# Patient Record
Sex: FEMALE | Race: WHITE | URBAN - METROPOLITAN AREA
[De-identification: names, ages, dates, MRNs, and addresses within clinical notes are randomized per-mention and may not be internally consistent; named-entity substitution may affect disease eponyms.]

---

## 2016-09-19 RX ORDER — METOPROLOL TARTRATE 50 MG
1 TABLET ORAL
Qty: 0 | Refills: 0 | COMMUNITY
Start: 2016-09-19

## 2016-09-19 RX ORDER — METOPROLOL TARTRATE 50 MG
0 TABLET ORAL
Qty: 90 | Refills: 0 | COMMUNITY
Start: 2016-09-19

## 2016-11-18 RX ORDER — LISINOPRIL 2.5 MG/1
0 TABLET ORAL
Qty: 90 | Refills: 0 | COMMUNITY
Start: 2016-11-18

## 2017-01-04 RX ORDER — CLOPIDOGREL BISULFATE 75 MG/1
1 TABLET, FILM COATED ORAL
Qty: 0 | Refills: 0 | COMMUNITY
Start: 2017-01-04

## 2017-01-04 RX ORDER — CLOPIDOGREL BISULFATE 75 MG/1
0 TABLET, FILM COATED ORAL
Qty: 90 | Refills: 0 | COMMUNITY
Start: 2017-01-04

## 2017-01-30 RX ORDER — AMLODIPINE BESYLATE 2.5 MG/1
0 TABLET ORAL
Qty: 90 | Refills: 0 | COMMUNITY
Start: 2017-01-30

## 2017-01-30 RX ORDER — AMLODIPINE BESYLATE 2.5 MG/1
1 TABLET ORAL
Qty: 0 | Refills: 0 | COMMUNITY
Start: 2017-01-30

## 2017-01-30 RX ORDER — AMLODIPINE BESYLATE 2.5 MG/1
0 TABLET ORAL
Qty: 0 | Refills: 0 | COMMUNITY
Start: 2017-01-30

## 2017-02-13 ENCOUNTER — INPATIENT (INPATIENT)
Facility: HOSPITAL | Age: 82
LOS: 2 days | Discharge: HOME | End: 2017-02-16
Attending: FAMILY MEDICINE | Admitting: FAMILY MEDICINE
Payer: MEDICARE

## 2017-02-13 VITALS
RESPIRATION RATE: 16 BRPM | DIASTOLIC BLOOD PRESSURE: 71 MMHG | WEIGHT: 154.32 LBS | HEART RATE: 52 BPM | HEIGHT: 64 IN | OXYGEN SATURATION: 100 % | SYSTOLIC BLOOD PRESSURE: 154 MMHG | TEMPERATURE: 98 F

## 2017-02-13 LAB
ALBUMIN SERPL ELPH-MCNC: 3.1 G/DL — LOW (ref 3.3–5)
ALP SERPL-CCNC: 57 U/L — SIGNIFICANT CHANGE UP (ref 40–120)
ALT FLD-CCNC: 15 U/L — SIGNIFICANT CHANGE UP (ref 12–78)
ANION GAP SERPL CALC-SCNC: 13 MMOL/L — SIGNIFICANT CHANGE UP (ref 5–17)
ANISOCYTOSIS BLD QL: SLIGHT — SIGNIFICANT CHANGE UP
APTT BLD: 24.7 SEC — LOW (ref 27.5–37.4)
AST SERPL-CCNC: 33 U/L — SIGNIFICANT CHANGE UP (ref 15–37)
BASOPHILS # BLD AUTO: 0 K/UL — SIGNIFICANT CHANGE UP (ref 0–0.2)
BILIRUB SERPL-MCNC: 0.4 MG/DL — SIGNIFICANT CHANGE UP (ref 0.2–1.2)
BUN SERPL-MCNC: 62 MG/DL — HIGH (ref 7–23)
CALCIUM SERPL-MCNC: 7.9 MG/DL — LOW (ref 8.5–10.1)
CHLORIDE SERPL-SCNC: 105 MMOL/L — SIGNIFICANT CHANGE UP (ref 96–108)
CK SERPL-CCNC: 159 U/L — SIGNIFICANT CHANGE UP (ref 26–192)
CO2 SERPL-SCNC: 20 MMOL/L — LOW (ref 22–31)
CREAT SERPL-MCNC: 3.24 MG/DL — HIGH (ref 0.5–1.3)
ELLIPTOCYTES BLD QL SMEAR: SLIGHT — SIGNIFICANT CHANGE UP
EOSINOPHIL # BLD AUTO: 0 K/UL — SIGNIFICANT CHANGE UP (ref 0–0.5)
GLUCOSE SERPL-MCNC: 95 MG/DL — SIGNIFICANT CHANGE UP (ref 70–99)
HCT VFR BLD CALC: 33.2 % — LOW (ref 34.5–45)
HGB BLD-MCNC: 11.8 G/DL — SIGNIFICANT CHANGE UP (ref 11.5–15.5)
INR BLD: 1 RATIO — SIGNIFICANT CHANGE UP (ref 0.88–1.16)
LYMPHOCYTES # BLD AUTO: 1.5 K/UL — SIGNIFICANT CHANGE UP (ref 1–3.3)
LYMPHOCYTES # BLD AUTO: 25 % — SIGNIFICANT CHANGE UP (ref 13–44)
MCHC RBC-ENTMCNC: 30.8 PG — SIGNIFICANT CHANGE UP (ref 27–34)
MCHC RBC-ENTMCNC: 35.6 GM/DL — SIGNIFICANT CHANGE UP (ref 32–36)
MCV RBC AUTO: 86.6 FL — SIGNIFICANT CHANGE UP (ref 80–100)
MONOCYTES # BLD AUTO: 0.7 K/UL — SIGNIFICANT CHANGE UP (ref 0–0.9)
MONOCYTES NFR BLD AUTO: 11 % — SIGNIFICANT CHANGE UP (ref 2–14)
NEUTROPHILS # BLD AUTO: 4.1 K/UL — SIGNIFICANT CHANGE UP (ref 1.8–7.4)
NEUTROPHILS NFR BLD AUTO: 57 % — SIGNIFICANT CHANGE UP (ref 43–77)
NEUTS BAND # BLD: 2 % — SIGNIFICANT CHANGE UP (ref 0–8)
PLAT MORPH BLD: NORMAL — SIGNIFICANT CHANGE UP
PLATELET # BLD AUTO: 159 K/UL — SIGNIFICANT CHANGE UP (ref 150–400)
POIKILOCYTOSIS BLD QL AUTO: SLIGHT — SIGNIFICANT CHANGE UP
POTASSIUM SERPL-MCNC: 3.9 MMOL/L — SIGNIFICANT CHANGE UP (ref 3.5–5.3)
POTASSIUM SERPL-SCNC: 3.9 MMOL/L — SIGNIFICANT CHANGE UP (ref 3.5–5.3)
PROT SERPL-MCNC: 6.1 GM/DL — SIGNIFICANT CHANGE UP (ref 6–8.3)
PROTHROM AB SERPL-ACNC: 11 SEC — SIGNIFICANT CHANGE UP (ref 10–13.1)
RBC # BLD: 3.83 M/UL — SIGNIFICANT CHANGE UP (ref 3.8–5.2)
RBC # FLD: 11.9 % — SIGNIFICANT CHANGE UP (ref 10.3–14.5)
RBC BLD AUTO: (no result)
SODIUM SERPL-SCNC: 138 MMOL/L — SIGNIFICANT CHANGE UP (ref 135–145)
TROPONIN I SERPL-MCNC: 0.02 NG/ML — SIGNIFICANT CHANGE UP (ref 0.01–0.04)
VARIANT LYMPHS # BLD: 5 % — SIGNIFICANT CHANGE UP (ref 0–6)
WBC # BLD: 6.3 K/UL — SIGNIFICANT CHANGE UP (ref 3.8–10.5)
WBC # FLD AUTO: 6.3 K/UL — SIGNIFICANT CHANGE UP (ref 3.8–10.5)

## 2017-02-13 PROCEDURE — 93010 ELECTROCARDIOGRAM REPORT: CPT

## 2017-02-13 PROCEDURE — 70450 CT HEAD/BRAIN W/O DYE: CPT | Mod: 26

## 2017-02-13 PROCEDURE — 99285 EMERGENCY DEPT VISIT HI MDM: CPT

## 2017-02-13 PROCEDURE — 71010: CPT | Mod: 26

## 2017-02-13 RX ORDER — SODIUM CHLORIDE 9 MG/ML
1000 INJECTION INTRAMUSCULAR; INTRAVENOUS; SUBCUTANEOUS
Qty: 0 | Refills: 0 | Status: DISCONTINUED | OUTPATIENT
Start: 2017-02-13 | End: 2017-02-14

## 2017-02-13 RX ORDER — ATORVASTATIN CALCIUM 80 MG/1
10 TABLET, FILM COATED ORAL AT BEDTIME
Qty: 0 | Refills: 0 | Status: DISCONTINUED | OUTPATIENT
Start: 2017-02-13 | End: 2017-02-16

## 2017-02-13 RX ORDER — CLOPIDOGREL BISULFATE 75 MG/1
75 TABLET, FILM COATED ORAL DAILY
Qty: 0 | Refills: 0 | Status: DISCONTINUED | OUTPATIENT
Start: 2017-02-13 | End: 2017-02-16

## 2017-02-13 RX ORDER — AMLODIPINE BESYLATE 2.5 MG/1
2.5 TABLET ORAL DAILY
Qty: 0 | Refills: 0 | Status: DISCONTINUED | OUTPATIENT
Start: 2017-02-13 | End: 2017-02-16

## 2017-02-13 RX ORDER — METOPROLOL TARTRATE 50 MG
100 TABLET ORAL DAILY
Qty: 0 | Refills: 0 | Status: DISCONTINUED | OUTPATIENT
Start: 2017-02-13 | End: 2017-02-16

## 2017-02-13 RX ORDER — HEPARIN SODIUM 5000 [USP'U]/ML
5000 INJECTION INTRAVENOUS; SUBCUTANEOUS EVERY 12 HOURS
Qty: 0 | Refills: 0 | Status: DISCONTINUED | OUTPATIENT
Start: 2017-02-13 | End: 2017-02-16

## 2017-02-13 RX ORDER — DOCUSATE SODIUM 100 MG
100 CAPSULE ORAL
Qty: 0 | Refills: 0 | Status: DISCONTINUED | OUTPATIENT
Start: 2017-02-13 | End: 2017-02-16

## 2017-02-13 RX ORDER — ASPIRIN/CALCIUM CARB/MAGNESIUM 324 MG
81 TABLET ORAL DAILY
Qty: 0 | Refills: 0 | Status: DISCONTINUED | OUTPATIENT
Start: 2017-02-13 | End: 2017-02-16

## 2017-02-13 RX ORDER — ACETAMINOPHEN 500 MG
650 TABLET ORAL EVERY 6 HOURS
Qty: 0 | Refills: 0 | Status: DISCONTINUED | OUTPATIENT
Start: 2017-02-13 | End: 2017-02-16

## 2017-02-13 RX ORDER — SODIUM CHLORIDE 9 MG/ML
3 INJECTION INTRAMUSCULAR; INTRAVENOUS; SUBCUTANEOUS ONCE
Qty: 0 | Refills: 0 | Status: DISCONTINUED | OUTPATIENT
Start: 2017-02-13 | End: 2017-02-16

## 2017-02-13 NOTE — ED PROVIDER NOTE - OBJECTIVE STATEMENT
89 y/o female with no PMHx presents to the ED s/p fall. Pt slipped off her bed. +syncopal episode this morning at the kitchen table while sitting in her chair. Pt unaware that she syncopized. +generalized soreness from falling. Pt states she has not eaten in a few days. Notes she was unable to sign her name. +weakness. +URI sx past few days. On anticoagulants. PMD Dr. Christopher 88 y/o female with PMHx of CAD s/p stents, CKD presents to the ED s/p fall. Pt slipped off her bed, landed on her butt on Saturday (4 days ago). +syncopal episode this morning at the kitchen table while sitting in her chair. Pt unaware that she syncopized. +generalized soreness from falling. Pt states she has not eaten in a few days. Notes she was unable to sign her name. +weakness. +URI sx past few days. On anticoagulants. PMD Dr. Christopher

## 2017-02-13 NOTE — ED PROVIDER NOTE - NS ED MD SCRIBE ATTENDING SCRIBE SECTIONS
DISPOSITION/REVIEW OF SYSTEMS/RESULTS/VITAL SIGNS( Pullset)/HISTORY OF PRESENT ILLNESS/PAST MEDICAL/SURGICAL/SOCIAL HISTORY/PROGRESS NOTE/PHYSICAL EXAM

## 2017-02-13 NOTE — H&P ADULT - HISTORY OF PRESENT ILLNESS
Pt is an 89 y/o woman with PMHx of CAD s/p stents, CKD presents to the ED s/p fall. Pt slipped off her bed, landed on her butt on Saturday (4 days ago). +syncopal episode this morning at the kitchen table while sitting in her chair. Pt unaware that she syncopized. +generalized soreness from falling. Pt states she has not eaten in a few days. Notes she was unable to sign her name. +weakness. +URI sx past few days. On anticoagulants.               Vitals  T(F): 97.7  HR: 61 (52 - 61)  BP: 146/69 (117/69 - 154/71)    RR: 17 (16 - 17) SpO2: 100% (100% - 100%) Pt is an 88 y/o woman with PMHx of CAD s/p stents, CKD presents to the ED s/p fall. Pt slipped off her bed, landed on her butt on Saturday (4 days ago). +syncopal episode this morning at the kitchen table while sitting in her chair. Pt unaware that she syncopized. +generalized soreness from falling. Pt states she has not eaten in a few days. Notes she was unable to sign her name. +weakness. +URI sx past few days. On anticoagulants.         PAST MEDICAL/SURG. HX:    Hypertension,   CKD II  CAD,  PCI.    SOCIAL HX:  She is a nonsmoker.  No history of alcohol or drug  abuse.  She is .  Currently, she lives at home with family.    FAMILY HX:  Noncontributory.                        Vitals  T(F): 97.7  HR: 61 (52 - 61)  BP: 146/69 (117/69 - 154/71)    RR: 17 (16 - 17) SpO2: 100% (100% - 100%) Pt is an 89 y/o woman with PMHx of CAD s/p stents, CKD II who  presents via EMS after LOC. She had a syncopal episode this morning at the kitchen table while sitting in her chair some time after breakfast.  She was  unaware that she syncopized. She c/o generalized soreness from falling.  She also slipped off her bed, landing on her bottom 4 days ago.  She reports she has not eaten very much in the last few days.   She reported recent weakness and cold sx a  few days ago.  She denies fever/chills/cpain, SOB, dizziness.        PAST MEDICAL/SURG. HX:    Hypertension,   CKD II  CAD,  PCI.    SOCIAL HX:  She is a nonsmoker.  No history of alcohol or drug  abuse.  She is .  Currently, she lives at home with family and takes care of her .    FAMILY HX:  Noncontributory.                        Vitals  T(F): 97.7  HR: 61 (52 - 61)  BP: 146/69 (117/69 - 154/71)    RR: 17 (16 - 17) SpO2: 100% (100% - 100%)

## 2017-02-13 NOTE — ED ADULT NURSE NOTE - OBJECTIVE STATEMENT
Pt report falling today, does not recall event, and on Friday.  Pt complains of back soreness.  No bruising or visible injury. Pt takes Plavix and asa.  Trauma alert initiated, DC'd by MD.

## 2017-02-13 NOTE — ED PROVIDER NOTE - PMH
<<----- Click to add NO pertinent Past Medical History No pertinent past medical history CAD (coronary artery disease)    CKD (chronic kidney disease)

## 2017-02-13 NOTE — H&P ADULT - ASSESSMENT
pravastatin Oral Dose: 10 mg At bedtime Special Instructions: As directed   2) 05/26/2012 21:03:16 ezetimibe Oral Dose: 10 mg At bedtime Special Instructions: As directed   3) 05/26/2012 21:02:43 lisinopril Oral Dose: 5 mg DAILY Special Instructions: As directed   4) 05/26/2012 21:02:20 hydrochlorothiazide Oral Dose: 25 mg DAILY Special Instructions: As directed   5) 05/26/2012 21:01:36 metoprolol succinate Oral Dose: 100 mg DAILY Special Instructions: As directed   6) 05/26/2012 20:58:16 clopidogrel Oral Dose: 75 mg DAILY Special Instructions: As directed   7) 05/26/2012 20:57:50 aspirin Oral Dose: 81 mg DAILY Special Instructions: As directed Pt is an 88 y/o woman with PMHx of CAD s/p stents, CKD II who  presents via EMS after LOC. She had a syncopal episode this morning at the kitchen table while sitting in her chair some time after breakfast.  She was  unaware that she syncopized. She c/o generalized soreness from falling.  She also slipped off her bed, landing on her bottom 4 days ago.  She reports she has not eaten very much in the last few days.   She reported recent weakness and cold sx a  few days ago.  She denies fever/chills/cpain, SOB, dizziness.    Pt is admitted w/    I. Syncope DDX arrythmia, vasovagal episode due to dehydration, ACS, valvular heart disease  - telemetry  - [facility] panel  - cont ASA, plavix, statin, B Blocker  - falls risk  - cardiology consult    II. Dehydration  - IVF    III.Acute on  CKD II? Pt has 1 functioning kidney  - Repeat renal function  - consult nephrology

## 2017-02-13 NOTE — ED ADULT NURSE REASSESSMENT NOTE - NS ED NURSE REASSESS COMMENT FT1
Recvd care of this pt at this time. Pt A & Ox 3, VSS, pt in apparent distress, bed rails up, will continue to monitor.

## 2017-02-14 DIAGNOSIS — J10.1 INFLUENZA DUE TO OTHER IDENTIFIED INFLUENZA VIRUS WITH OTHER RESPIRATORY MANIFESTATIONS: ICD-10-CM

## 2017-02-14 DIAGNOSIS — N18.9 CHRONIC KIDNEY DISEASE, UNSPECIFIED: ICD-10-CM

## 2017-02-14 DIAGNOSIS — R29.6 REPEATED FALLS: ICD-10-CM

## 2017-02-14 DIAGNOSIS — R55 SYNCOPE AND COLLAPSE: ICD-10-CM

## 2017-02-14 DIAGNOSIS — I25.10 ATHEROSCLEROTIC HEART DISEASE OF NATIVE CORONARY ARTERY WITHOUT ANGINA PECTORIS: ICD-10-CM

## 2017-02-14 DIAGNOSIS — D64.9 ANEMIA, UNSPECIFIED: ICD-10-CM

## 2017-02-14 DIAGNOSIS — N17.9 ACUTE KIDNEY FAILURE, UNSPECIFIED: ICD-10-CM

## 2017-02-14 DIAGNOSIS — Z90.721 ACQUIRED ABSENCE OF OVARIES, UNILATERAL: Chronic | ICD-10-CM

## 2017-02-14 LAB
ANION GAP SERPL CALC-SCNC: 13 MMOL/L — SIGNIFICANT CHANGE UP (ref 5–17)
BUN SERPL-MCNC: 61 MG/DL — HIGH (ref 7–23)
CALCIUM SERPL-MCNC: 7.8 MG/DL — LOW (ref 8.5–10.1)
CHLORIDE SERPL-SCNC: 107 MMOL/L — SIGNIFICANT CHANGE UP (ref 96–108)
CO2 SERPL-SCNC: 18 MMOL/L — LOW (ref 22–31)
CREAT SERPL-MCNC: 3 MG/DL — HIGH (ref 0.5–1.3)
FLUAV H1 2009 PAND RNA SPEC QL NAA+PROBE: DETECTED
GLUCOSE SERPL-MCNC: 77 MG/DL — SIGNIFICANT CHANGE UP (ref 70–99)
HCT VFR BLD CALC: 32.5 % — LOW (ref 34.5–45)
HGB BLD-MCNC: 10.6 G/DL — LOW (ref 11.5–15.5)
MCHC RBC-ENTMCNC: 28.7 PG — SIGNIFICANT CHANGE UP (ref 27–34)
MCHC RBC-ENTMCNC: 32.7 GM/DL — SIGNIFICANT CHANGE UP (ref 32–36)
MCV RBC AUTO: 87.8 FL — SIGNIFICANT CHANGE UP (ref 80–100)
PLATELET # BLD AUTO: 172 K/UL — SIGNIFICANT CHANGE UP (ref 150–400)
POTASSIUM SERPL-MCNC: 3.7 MMOL/L — SIGNIFICANT CHANGE UP (ref 3.5–5.3)
POTASSIUM SERPL-SCNC: 3.7 MMOL/L — SIGNIFICANT CHANGE UP (ref 3.5–5.3)
RAPID RVP RESULT: DETECTED
RBC # BLD: 3.7 M/UL — LOW (ref 3.8–5.2)
RBC # FLD: 12.4 % — SIGNIFICANT CHANGE UP (ref 10.3–14.5)
SODIUM SERPL-SCNC: 138 MMOL/L — SIGNIFICANT CHANGE UP (ref 135–145)
TROPONIN I SERPL-MCNC: 0.02 NG/ML — SIGNIFICANT CHANGE UP (ref 0.01–0.04)
WBC # BLD: 5.2 K/UL — SIGNIFICANT CHANGE UP (ref 3.8–10.5)
WBC # FLD AUTO: 5.2 K/UL — SIGNIFICANT CHANGE UP (ref 3.8–10.5)

## 2017-02-14 RX ORDER — CHOLECALCIFEROL (VITAMIN D3) 125 MCG
1000 CAPSULE ORAL DAILY
Qty: 0 | Refills: 0 | Status: DISCONTINUED | OUTPATIENT
Start: 2017-02-14 | End: 2017-02-16

## 2017-02-14 RX ORDER — SODIUM CHLORIDE 9 MG/ML
1000 INJECTION, SOLUTION INTRAVENOUS
Qty: 0 | Refills: 0 | Status: DISCONTINUED | OUTPATIENT
Start: 2017-02-14 | End: 2017-02-16

## 2017-02-14 RX ADMIN — AMLODIPINE BESYLATE 2.5 MILLIGRAM(S): 2.5 TABLET ORAL at 06:19

## 2017-02-14 RX ADMIN — HEPARIN SODIUM 5000 UNIT(S): 5000 INJECTION INTRAVENOUS; SUBCUTANEOUS at 06:19

## 2017-02-14 RX ADMIN — Medication 30 MILLIGRAM(S): at 12:40

## 2017-02-14 RX ADMIN — HEPARIN SODIUM 5000 UNIT(S): 5000 INJECTION INTRAVENOUS; SUBCUTANEOUS at 18:10

## 2017-02-14 RX ADMIN — SODIUM CHLORIDE 100 MILLILITER(S): 9 INJECTION, SOLUTION INTRAVENOUS at 14:12

## 2017-02-14 RX ADMIN — Medication 0.1 MILLIGRAM(S): at 06:19

## 2017-02-14 RX ADMIN — CLOPIDOGREL BISULFATE 75 MILLIGRAM(S): 75 TABLET, FILM COATED ORAL at 01:05

## 2017-02-14 RX ADMIN — Medication 0.1 MILLIGRAM(S): at 01:05

## 2017-02-14 RX ADMIN — ATORVASTATIN CALCIUM 10 MILLIGRAM(S): 80 TABLET, FILM COATED ORAL at 01:05

## 2017-02-14 RX ADMIN — Medication 100 MILLIGRAM(S): at 06:19

## 2017-02-14 RX ADMIN — Medication 100 MILLIGRAM(S): at 18:10

## 2017-02-14 RX ADMIN — Medication 81 MILLIGRAM(S): at 01:05

## 2017-02-14 RX ADMIN — Medication 650 MILLIGRAM(S): at 03:53

## 2017-02-14 RX ADMIN — SODIUM CHLORIDE 150 MILLILITER(S): 9 INJECTION INTRAMUSCULAR; INTRAVENOUS; SUBCUTANEOUS at 04:02

## 2017-02-14 RX ADMIN — Medication 1000 UNIT(S): at 18:10

## 2017-02-14 NOTE — PROGRESS NOTE ADULT - PROBLEM SELECTOR PLAN 1
troponin x 2 neg, repeat in am, c/w DAPT, statin, BB, cardiology consult  Ct head negative, non-focal exam, check orthostatics

## 2017-02-14 NOTE — PROGRESS NOTE ADULT - SUBJECTIVE AND OBJECTIVE BOX
Subjective:  Patient is a 89y old  Female who presents with a chief complaint of I fainted and my son called 911 (2017 03:20)    HPI:  Pt is an 89 y/o woman with PMHx of CAD s/p stents, CKD II who  presents via EMS after LOC. She had a syncopal episode this morning at the kitchen table while sitting in her chair some time after breakfast.  She was  unaware that she syncopized. She c/o generalized soreness from falling.  She also slipped off her bed, landing on her bottom 4 days ago.  She reports she has not eaten very much in the last few days.   She reported recent weakness and cold sx a  few days ago.  She denies fever/chills/cpain, SOB, dizziness.        PAST MEDICAL/SURG. HX:    Hypertension,   CKD II  CAD,  PCI.    SOCIAL HX:  She is a nonsmoker.  No history of alcohol or drug  abuse.  She is .  Currently, she lives at home with family and takes care of her .    FAMILY HX:  Noncontributory.                        Vitals  T(F): 97.7  HR: 61 (52 - 61)  BP: 146/69 (117/69 - 154/71)    RR: 17 (16 - 17) SpO2: 100% (100% - 100%) (2017 20:49)      Patient seen and examined at bedside,     Review of system- Rest of the review of system are normal excpet mentioned in HPI    OBJECTIVE:   T(C): 36.7, Max: 36.9 (-14 @ 00:32)  HR: 60 (52 - 93)  BP: 122/73 (110/51 - 178/88)  RR: 17 (16 - 18)  SpO2: 94% (66% - 98%)  Wt(kg): --  Daily     Daily Weight in k.5 (2017 08:22)    LABS:                        10.6   5.2   )-----------( 172      ( 2017 05:21 )             32.5     2017 05:21    138    |  107    |  61     ----------------------------<  77     3.7     |  18     |  3.00     Ca    7.8        2017 05:21    TPro  6.1    /  Alb  3.1    /  TBili  0.4    /  DBili  x      /  AST  33     /  ALT  15     /  AlkPhos  57     2017 19:19    PT/INR - ( 2017 19:19 )   PT: 11.0 sec;   INR: 1.00 ratio         PTT - ( 2017 19:19 )  PTT:24.7 sec  CARDIAC MARKERS ( 2017 01:24 )  0.021 ng/mL / x     / x     / x     / x      CARDIAC MARKERS ( 2017 19:19 )  0.017 ng/mL / x     / 159 U/L / x     / x              CAPILLARY BLOOD GLUCOSE        RECENT CULTURES:    RADIOLOGY & ADDITIONAL TESTS:      PHYSICAL EXAM:  GENERAL: NAD  NERVOUS SYSTEM:  Alert & Oriented X3, non- focal exam,Motor Strength 5/5 B/L upper and lower extremities; DTRs 2+ intact and symmetric  HEAD:  Atraumatic, Normocephalic  EYES: EOMI, PERRLA, conjunctiva and sclera clear  HEENT: Moist mucous membranes  NECK: Supple, No JVD  CHEST/LUNG: Clear to auscultation bilaterally; No rales, no rhonchi, no wheezing, or rubs  HEART: Regular rate and rhythm; No murmurs, rubs, or gallops  ABDOMEN: Soft, Nontender, Nondistended; Bowel sounds present  GENITOURINARY- Voiding, no suprapubic tendeness  EXTREMITIES:  2+ Peripheral Pulses, No clubbing, cyanosis, or edema  MUSCULOSKELETAL:- No muscle tenderness, Muscle tone normal, No joint tenderness, no Joint swelling, Joint range of motion-normal  SKIN-no rash, no lesion      Current medications:  sodium chloride 0.9% lock flush 3milliLiter(s) IV Push once  acetaminophen   Tablet. 650milliGRAM(s) Oral every 6 hours PRN  docusate sodium 100milliGRAM(s) Oral two times a day  heparin  Injectable 5000Unit(s) SubCutaneous every 12 hours  atorvastatin 10milliGRAM(s) Oral at bedtime  clopidogrel Tablet 75milliGRAM(s) Oral daily  metoprolol succinate ER 100milliGRAM(s) Oral daily  amLODIPine   Tablet 2.5milliGRAM(s) Oral daily  aspirin  chewable 81milliGRAM(s) Oral daily  oseltamivir 30milliGRAM(s) Oral daily  sodium chloride 0.45%. 1000milliLiter(s) IV Continuous <Continuous> Subjective:  Patient is a 89y old  Female who presents with a chief complaint of I fainted and my son called 911 (2017 03:20)    HPI:     88 y/o woman with PMHx of CAD s/p stents, HTN,  CKD II admitted on dmitted post-syncope at home, +  LOC. She had a syncopal episode  at the kitchen table  in am while sitting in her chair some time after breakfast.  She was  unaware that she synopsized.  She c/o generalized soreness from falling.  She also slipped off her bed, landing on her bottom 4 days ago.  She reports she has not eaten very much in the last few days.   She reported recent weakness and cold sx a  few days ago.      + for influenza infection on Tamiflu reports poor PO intake, denies CP, palpitations, weakness, numbness, afebrile, tolerates IV fluids    Patient seen and examined at bedside,     Review of system- Rest of the review of system are normal excpet mentioned in HPI          OBJECTIVE:   T(C): 36.7, Max: 36.9 ( @ 00:32)  HR: 60 (52 - 93)  BP: 122/73 (110/51 - 178/88)  RR: 17 (16 - 18)  SpO2: 94% (66% - 98%)  Wt(kg): --  Daily     Daily Weight in k.5 (2017 08:22)    LABS:                        10.6   5.2   )-----------( 172      ( 2017 05:21 )             32.5     2017 05:21    138    |  107    |  61     ----------------------------<  77     3.7     |  18     |  3.00     Ca    7.8        2017 05:21    TPro  6.1    /  Alb  3.1    /  TBili  0.4    /  DBili  x      /  AST  33     /  ALT  15     /  AlkPhos  57     2017 19:19    PT/INR - ( 2017 19:19 )   PT: 11.0 sec;   INR: 1.00 ratio         PTT - ( 2017 19:19 )  PTT:24.7 sec  CARDIAC MARKERS ( 2017 01:24 )  0.021 ng/mL / x     / x     / x     / x      CARDIAC MARKERS ( 2017 19:19 )  0.017 ng/mL / x     / 159 U/L / x     / x          CAPILLARY BLOOD GLUCOSE        RECENT CULTURES:    RADIOLOGY & ADDITIONAL TESTS:  EXAM:  CHEST SINGLE VIEW FRONTAL                            PROCEDURE DATE:  2017        INTERPRETATION:  Exam Date: 2017 4:27 PM    History: Chest pain    Technique: Single frontal portable view of the chest with comparison to    2012    Findings:    The heart is mildly enlarged.  The lungs are grossly clear. The apices   and hemidiaphragms are unremarkable. Degenerative changes of the   visualized osseous structures.        Impression:    No acute disease    No significant interval change as compared to  2012    EXAM:  CT BRAIN                            PROCEDURE DATE:  2017  IMPRESSION:   mild to moderate periventricular white matter ischemia.    Global atrophy.        PHYSICAL EXAM:  GENERAL: NAD  NERVOUS SYSTEM:  Alert & Oriented X3, non- focal exam,Motor Strength 5/5 B/L upper and lower extremities; DTRs 2+ intact and symmetric  HEAD:  Atraumatic, Normocephalic  EYES: EOMI, PERRLA, conjunctiva and sclera clear  HEENT: Moist mucous membranes  NECK: Supple, No JVD  CHEST/LUNG: Clear to auscultation bilaterally; No rales, no rhonchi, no wheezing, or rubs  HEART: Regular rate and rhythm; No murmurs, rubs, or gallops  ABDOMEN: Soft, Nontender, Nondistended; Bowel sounds present  GENITOURINARY- Voiding, no suprapubic tendeness  EXTREMITIES:  2+ Peripheral Pulses, No clubbing, cyanosis, or edema  MUSCULOSKELETAL:- No muscle tenderness, Muscle tone normal, No joint tenderness, no Joint swelling, Joint range of motion-normal  SKIN-no rash, no lesion      Current medications:  sodium chloride 0.9% lock flush 3milliLiter(s) IV Push once  acetaminophen   Tablet. 650milliGRAM(s) Oral every 6 hours PRN  docusate sodium 100milliGRAM(s) Oral two times a day  heparin  Injectable 5000Unit(s) SubCutaneous every 12 hours  atorvastatin 10milliGRAM(s) Oral at bedtime  clopidogrel Tablet 75milliGRAM(s) Oral daily  metoprolol succinate ER 100milliGRAM(s) Oral daily  amLODIPine   Tablet 2.5milliGRAM(s) Oral daily  aspirin  chewable 81milliGRAM(s) Oral daily  oseltamivir 30milliGRAM(s) Oral daily  sodium chloride 0.45%. 1000milliLiter(s) IV Continuous <Continuous>

## 2017-02-14 NOTE — CONSULT NOTE ADULT - SUBJECTIVE AND OBJECTIVE BOX
Chief complaints.  Fell at home and passed out.    HPI:  89 yo woman with Hx of HTN, CAD ( stent placed in ) and known CKD  (baseline creat ~ 2.2 in 2016) admitted post syncope at home.  Pt denies any preceding symptoms. Reports very poor po intake for one week.  Denies fever, cough or SOB. Denies N/V/D.  She also fell on her bottom several days ago.  On admission, found to have clear lungs with worsened renal fx.        PMHX and PSHX.  1.CKD  ( creat 2.2 in 2016)    FAMILY HISTORY:  No pertinent family history in first degree relatives      SOCIAL HISTORY :  No hx of smoking or ETOH    ALLERGIES :  No Known Allergies    Intolerances    ROS.:   Denies dizzyness, reports poor appetite, Denies SOB, Denies chest discomfort, Denies abd discomfort         MEDICATIONS  (STANDING):  sodium chloride 0.9% lock flush 3milliLiter(s) IV Push once  docusate sodium 100milliGRAM(s) Oral two times a day  sodium chloride 0.9%. 1000milliLiter(s) IV Continuous <Continuous>  heparin  Injectable 5000Unit(s) SubCutaneous every 12 hours  cloNIDine 0.1milliGRAM(s) Oral two times a day  atorvastatin 10milliGRAM(s) Oral at bedtime  clopidogrel Tablet 75milliGRAM(s) Oral daily  metoprolol succinate ER 100milliGRAM(s) Oral daily  amLODIPine   Tablet 2.5milliGRAM(s) Oral daily  aspirin  chewable 81milliGRAM(s) Oral daily  oseltamivir 30milliGRAM(s) Oral daily    MEDICATIONS  (PRN):  acetaminophen   Tablet. 650milliGRAM(s) Oral every 6 hours PRN Mild Pain (1 - 3)         Vital Signs Last 24 Hrs  T(C): 36.7, Max: 36.9 (- @ 00:32)  T(F): 98.1, Max: 98.5 (- @ 00:32)  HR: 60 (52 - 93)  BP: 122/73 (110/51 - 178/88)  BP(mean): --  RR: 17 (16 - 18)  SpO2: 94% (66% - 98%)  Daily Height in cm: 165.1 (2017 13:32)    Daily Weight in k.5 (2017 08:22)  I&O's Summary    I & Os for current day (as of 2017 12:52)  =============================================  IN: 262 ml / OUT: 0 ml / NET: 262 ml      PHYSICAL EXAM:  Alert and appropriate  GEN: No apparent distress  HEENT: WNL  NECK : Supple  CV: S1S2 RRR  LUNGS: clear to aus  ABD: soft  EXT: no edema    LABS:                        10.6   5.2   )-----------( 172      ( 2017 05:21 )             32.5     2017 05:21    138    |  107    |  61     ----------------------------<  77     3.7     |  18     |  3.00     Ca    7.8        2017 05:21    TPro  6.1    /  Alb  3.1    /  TBili  0.4    /  DBili  x      /  AST  33     /  ALT  15     /  AlkPhos  57     2017 19:19    PT/INR - ( 2017 19:19 )   PT: 11.0 sec;   INR: 1.00 ratio         PTT - ( 2017 19:19 )  PTT:24.7 sec

## 2017-02-14 NOTE — PATIENT PROFILE ADULT. - VISION (WITH CORRECTIVE LENSES IF THE PATIENT USUALLY WEARS THEM):
wears glasses for tv/Normal vision: sees adequately in most situations; can see medication labels, newsprint

## 2017-02-14 NOTE — CONSULT NOTE ADULT - ASSESSMENT
89 yo woman with HTN, and known CAD and CKD admitted post syncope.  Positive for influenza.  LEVI likely due to recent one week hx of very limited po intake.    --LEVI/CKD : Continue IVF until adequate po intake can be maintained  --Anemia work up  --HTN  : adjust meds as needed to avoid continued borderline BP.

## 2017-02-14 NOTE — PROGRESS NOTE ADULT - ASSESSMENT
89 y/o woman with PMHx of CAD s/p stents, HTN,  CKD II admitted on 2/13/17admitted post-syncope at home, +  LOC. She had a syncopal episode  at the kitchen table  in am while sitting in her chair some time after breakfast.  She was  unaware that she synopsized.  She c/o generalized soreness from falling.  She also slipped off her bed, landing on her bottom 4 days ago.  She reports she has not eaten very much in the last few days.   She reported recent weakness and cold sx a  few days ago.     2/14 + for influenza infection on Tamiflu reports poor PO intake, denies CP, palpitations, weakness, numbness, afebrile, tolerates IV fluids

## 2017-02-15 LAB
ALBUMIN SERPL ELPH-MCNC: 2.8 G/DL — LOW (ref 3.3–5)
ANION GAP SERPL CALC-SCNC: 11 MMOL/L — SIGNIFICANT CHANGE UP (ref 5–17)
BUN SERPL-MCNC: 50 MG/DL — HIGH (ref 7–23)
CALCIUM SERPL-MCNC: 7.7 MG/DL — LOW (ref 8.5–10.1)
CHLORIDE SERPL-SCNC: 110 MMOL/L — HIGH (ref 96–108)
CO2 SERPL-SCNC: 18 MMOL/L — LOW (ref 22–31)
CREAT SERPL-MCNC: 2.7 MG/DL — HIGH (ref 0.5–1.3)
FERRITIN SERPL-MCNC: 277 NG/ML — HIGH (ref 15–150)
GLUCOSE SERPL-MCNC: 79 MG/DL — SIGNIFICANT CHANGE UP (ref 70–99)
HCT VFR BLD CALC: 30.6 % — LOW (ref 34.5–45)
HCT VFR BLD CALC: 31 % — LOW (ref 34–45)
HGB BLD-MCNC: 10.4 G/DL — LOW (ref 11.5–15.5)
IRON SATN MFR SERPL: 22 % — SIGNIFICANT CHANGE UP (ref 14–50)
IRON SATN MFR SERPL: 42 UG/DL — SIGNIFICANT CHANGE UP (ref 30–160)
MCHC RBC-ENTMCNC: 29.5 PG — SIGNIFICANT CHANGE UP (ref 27–34)
MCHC RBC-ENTMCNC: 33.8 GM/DL — SIGNIFICANT CHANGE UP (ref 32–36)
MCV RBC AUTO: 87.2 FL — SIGNIFICANT CHANGE UP (ref 80–100)
PHOSPHATE SERPL-MCNC: 2.6 MG/DL — SIGNIFICANT CHANGE UP (ref 2.5–4.5)
PLATELET # BLD AUTO: 175 K/UL — SIGNIFICANT CHANGE UP (ref 150–400)
POTASSIUM SERPL-MCNC: 3.6 MMOL/L — SIGNIFICANT CHANGE UP (ref 3.5–5.3)
POTASSIUM SERPL-SCNC: 3.6 MMOL/L — SIGNIFICANT CHANGE UP (ref 3.5–5.3)
RBC # BLD: 3.51 M/UL — LOW (ref 3.8–5.2)
RBC # FLD: 12.3 % — SIGNIFICANT CHANGE UP (ref 10.3–14.5)
SODIUM SERPL-SCNC: 139 MMOL/L — SIGNIFICANT CHANGE UP (ref 135–145)
TIBC SERPL-MCNC: 193 UG/DL — LOW (ref 220–430)
TROPONIN I SERPL-MCNC: 0.02 NG/ML — SIGNIFICANT CHANGE UP (ref 0.01–0.04)
UIBC SERPL-MCNC: 151 UG/DL — SIGNIFICANT CHANGE UP (ref 110–370)
VIT B12 SERPL-MCNC: 875 PG/ML — SIGNIFICANT CHANGE UP (ref 243–894)
WBC # BLD: 5.7 K/UL — SIGNIFICANT CHANGE UP (ref 3.8–10.5)
WBC # FLD AUTO: 5.7 K/UL — SIGNIFICANT CHANGE UP (ref 3.8–10.5)

## 2017-02-15 PROCEDURE — 99223 1ST HOSP IP/OBS HIGH 75: CPT

## 2017-02-15 RX ADMIN — SODIUM CHLORIDE 100 MILLILITER(S): 9 INJECTION, SOLUTION INTRAVENOUS at 02:02

## 2017-02-15 RX ADMIN — Medication 1000 UNIT(S): at 13:05

## 2017-02-15 RX ADMIN — Medication 100 MILLIGRAM(S): at 06:26

## 2017-02-15 RX ADMIN — AMLODIPINE BESYLATE 2.5 MILLIGRAM(S): 2.5 TABLET ORAL at 06:26

## 2017-02-15 RX ADMIN — ATORVASTATIN CALCIUM 10 MILLIGRAM(S): 80 TABLET, FILM COATED ORAL at 22:09

## 2017-02-15 RX ADMIN — CLOPIDOGREL BISULFATE 75 MILLIGRAM(S): 75 TABLET, FILM COATED ORAL at 13:05

## 2017-02-15 RX ADMIN — Medication 81 MILLIGRAM(S): at 13:05

## 2017-02-15 RX ADMIN — Medication 30 MILLIGRAM(S): at 13:04

## 2017-02-15 RX ADMIN — HEPARIN SODIUM 5000 UNIT(S): 5000 INJECTION INTRAVENOUS; SUBCUTANEOUS at 06:26

## 2017-02-15 RX ADMIN — HEPARIN SODIUM 5000 UNIT(S): 5000 INJECTION INTRAVENOUS; SUBCUTANEOUS at 17:34

## 2017-02-15 RX ADMIN — Medication 100 MILLIGRAM(S): at 13:05

## 2017-02-15 NOTE — PROGRESS NOTE ADULT - ASSESSMENT
89 yo woman with HTN, and known CAD and CKD admitted post syncope.  Positive for influenza.  LEVI likely due to recent one week hx of very limited po intake.    --LEVI/CKD : Continue IVF until adequate po intake can be maintained  --Anemia work up  --HTN  : adjust meds as needed to avoid continued borderline BP.    2/15   --LEVI/CKD improving   Continue current IVF for maintenance fluid and follow po intake.  --ANEMIA : work up   --HTN  Control fair.  Continue to follow.

## 2017-02-15 NOTE — CONSULT NOTE ADULT - ASSESSMENT
Assessment:  · Assessment		  Pt is an 88 y/o woman with PMHx of CAD s/p stents, CKD II who  presents via EMS after LOC. She had a syncopal episode this morning at the kitchen table while sitting in her chair some time after breakfast.  She was  unaware that she syncopized. She c/o generalized soreness from falling.  She also slipped off her bed, landing on her bottom 4 days ago.  She reports she has not eaten very much in the last few days.   She reported recent weakness and cold sx a  few days ago.  She denies fever/chills/cpain, SOB, dizziness.    Pt is admitted w/    I. Syncope DDX arrythmia, vasovagal episode due to dehydration,   - telemetry  - [facility] panel  - cont ASA, plavix, statin, B Blocker  - falls risk      II. Dehydration  - IVF  positive influenza    III.Acute on  CKD II? Pt has 1 functioning kidney  - Repeat renal function  renal consult  -

## 2017-02-15 NOTE — PROGRESS NOTE ADULT - SUBJECTIVE AND OBJECTIVE BOX
NEPHROLOGY INTERVAL HPI/OVERNIGHT EVENTS:  Feeling weak, still with poor po intake.    HPI:  Pt is an 89 y/o woman with PMHx of CAD s/p stents, CKD II who  presents via EMS after LOC. She had a syncopal episode this morning at the kitchen table while sitting in her chair some time after breakfast.  She was  unaware that she syncopized. She c/o generalized soreness from falling.  She also slipped off her bed, landing on her bottom 4 days ago.  She reports she has not eaten very much in the last few days.   She reported recent weakness and cold sx a  few days ago.  She denies fever/chills/cpain, SOB, dizziness.        PAST MEDICAL/SURG. HX:    Hypertension,   CKD II  CAD,  PCI.    SOCIAL HX:  She is a nonsmoker.  No history of alcohol or drug  abuse.  She is .  Currently, she lives at home with family and takes care of her .    FAMILY HX:  Noncontributory.    Vitals  T(F): 97.7  HR: 61 (52 - 61)  BP: 146/69 (117/69 - 154/71)    RR: 17 (16 - 17) SpO2: 100% (100% - 100%) (2017 20:49)          MEDICATIONS  (STANDING):  sodium chloride 0.9% lock flush 3milliLiter(s) IV Push once  docusate sodium 100milliGRAM(s) Oral two times a day  heparin  Injectable 5000Unit(s) SubCutaneous every 12 hours  atorvastatin 10milliGRAM(s) Oral at bedtime  clopidogrel Tablet 75milliGRAM(s) Oral daily  metoprolol succinate ER 100milliGRAM(s) Oral daily  amLODIPine   Tablet 2.5milliGRAM(s) Oral daily  aspirin  chewable 81milliGRAM(s) Oral daily  oseltamivir 30milliGRAM(s) Oral daily  sodium chloride 0.45%. 1000milliLiter(s) IV Continuous <Continuous>  cholecalciferol 1000Unit(s) Oral daily    MEDICATIONS  (PRN):  acetaminophen   Tablet. 650milliGRAM(s) Oral every 6 hours PRN Mild Pain (1 - 3)          Vital Signs Last 24 Hrs  T(C): 36.4, Max: 36.9 (02-15 @ 06:20)  T(F): 97.6, Max: 98.4 (02-15 @ 06:20)  HR: 67 (58 - 68)  BP: 122/89 (122/89 - 165/89)  BP(mean): --  RR: 18 (18 - 18)  SpO2: 95% (95% - 98%)  Daily     Daily Weight in k (15 Feb 2017 10:25)    I & Os for current day (as of 02-15 @ 11:21)  =============================================  IN: 999 ml / OUT: 0 ml / NET: 999 ml      PHYSICAL EXAM:  Alert and appropriate  GENERAL: no acute distress  CHEST/LUNG: clear to aus  HEART: s1s2 RRR  ABDOMEN: soft  EXTREMITIES:no edema   SKIN:     LABS:                        10.4   5.7   )-----------( 175      ( 15 Feb 2017 06:19 )             30.6     15 Feb 2017 06:19    139    |  110    |  50     ----------------------------<  79     3.6     |  18     |  2.70     Ca    7.7        15 Feb 2017 06:19  Phos  2.6       15 Feb 2017 06:19    TPro  x      /  Alb  2.8    /  TBili  x      /  DBili  x      /  AST  x      /  ALT  x      /  AlkPhos  x      15 Feb 2017 06:19    PT/INR - ( 2017 19:19 )   PT: 11.0 sec;   INR: 1.00 ratio         PTT - ( 2017 19:19 )  PTT:24.7 sec    Phosphorus Level, Serum: 2.6 mg/dL (02-15 @ 06:19)          RADIOLOGY & ADDITIONAL TESTS:

## 2017-02-15 NOTE — PROGRESS NOTE ADULT - SUBJECTIVE AND OBJECTIVE BOX
CHIEF COMPLAINT:  flu    SUBJECTIVE:   feeling ok. not sleeping well here. wants to go home. has lots of support.    REVIEW OF SYSTEMS:      EYES/ENT: No visual changes;  No vertigo or throat pain   RESPIRATORY: + cough  CARDIOVASCULAR: No chest pain or palpitations  GASTROINTESTINAL: No abdominal or epigastric pain  All other review of systems is negative unless indicated above    Vital Signs Last 24 Hrs  T(C): 36.4, Max: 36.9 (02-15 @ 06:20)  T(F): 97.6, Max: 98.4 (02-15 @ 06:20)  HR: 67 (58 - 68)  BP: 122/89 (122/89 - 165/89)  BP(mean): --  RR: 18 (18 - 18)  SpO2: 95% (95% - 98%)    I&O's Summary    I & Os for current day (as of 15 Feb 2017 15:48)  =============================================  IN: 999 ml / OUT: 0 ml / NET: 999 ml      CAPILLARY BLOOD GLUCOSE      PHYSICAL EXAM:    Constitutional: NAD, awake and alert, well-developed  HEENT: PERR, EOMI, Normal Hearing, MMM  Neck: Soft and supple, No LAD, No JVD  Respiratory: Breath sounds are coarse  Cardiovascular: S1 and S2, regular rate and rhythm, no Murmurs, gallops or rubs  Gastrointestinal: Bowel Sounds present, soft  Extremities: No peripheral edema  Vascular: 2+ peripheral pulses  Neurological: A/O x 3, no focal deficits  Musculoskeletal: moves all extrem  Skin: No rashes    MEDICATIONS:  MEDICATIONS  (STANDING):  sodium chloride 0.9% lock flush 3milliLiter(s) IV Push once  docusate sodium 100milliGRAM(s) Oral two times a day  heparin  Injectable 5000Unit(s) SubCutaneous every 12 hours  atorvastatin 10milliGRAM(s) Oral at bedtime  clopidogrel Tablet 75milliGRAM(s) Oral daily  metoprolol succinate ER 100milliGRAM(s) Oral daily  amLODIPine   Tablet 2.5milliGRAM(s) Oral daily  aspirin  chewable 81milliGRAM(s) Oral daily  oseltamivir 30milliGRAM(s) Oral daily  sodium chloride 0.45%. 1000milliLiter(s) IV Continuous <Continuous>  cholecalciferol 1000Unit(s) Oral daily      LABS: All Labs Reviewed:                        10.4   5.7   )-----------( 175      ( 15 Feb 2017 06:19 )             30.6     15 Feb 2017 06:19    139    |  110    |  50     ----------------------------<  79     3.6     |  18     |  2.70     Ca    7.7        15 Feb 2017 06:19  Phos  2.6       15 Feb 2017 06:19    TPro  x      /  Alb  2.8    /  TBili  x      /  DBili  x      /  AST  x      /  ALT  x      /  AlkPhos  x      15 Feb 2017 06:19    PT/INR - ( 13 Feb 2017 19:19 )   PT: 11.0 sec;   INR: 1.00 ratio         PTT - ( 13 Feb 2017 19:19 )  PTT:24.7 sec  CARDIAC MARKERS ( 15 Feb 2017 06:19 )  0.024 ng/mL / x     / x     / x     / x      CARDIAC MARKERS ( 14 Feb 2017 01:24 )  0.021 ng/mL / x     / x     / x     / x      CARDIAC MARKERS ( 13 Feb 2017 19:19 )  0.017 ng/mL / x     / 159 U/L / x     / x              89 y/o woman with PMHx of CAD s/p stents, HTN,  CKD II admitted on 2/13/17admitted post-syncope at home, +  LOC. She had a syncopal episode  at the kitchen table  in am while sitting in her chair some time after breakfast.  She was  unaware that she synopsized.  She c/o generalized soreness from falling.  She also slipped off her bed, landing on her bottom 4 days ago.  She reports she has not eaten very much in the last few days.   She reported recent weakness and cold sx a  few days ago.     She was admitted to telemetry for syncope evaluation. She tested + for influenza infection on Tamiflu. She was seen by renal, cardiology, and PT prior to discharge.      Assessment and Plan:       Problem/Plan - 1:  · Syncope  troponin  neg, telemetry unrevealing  Ct head negative, non-focal exam,  orthostatics ordered.     Problem/Plan - 2:  · LEVI (acute kidney injury) on CKD stage III    IV fluids, renal consult appreciated.     Problem/Plan - 3:  ·  CAD (coronary artery disease).    c/w DAPT, statins, BB.     Problem/Plan - 4:  Anemia  iron stuides, b12 pre renal    Problem/Plan - 6:   Influenza A virus present.   tamiflu 2/5 days, respiratory isolation.      Problem/Plan - 7:  ·  Falls frequently.    start vitamin D, PT consult, check B12, orthostatics.     Anticipate discharge in AM with final PT recs.

## 2017-02-15 NOTE — CONSULT NOTE ADULT - SUBJECTIVE AND OBJECTIVE BOX
PCP:    REQUESTING PHYSICIAN:    REASON FOR CONSULT:    CHIEF COMPLAINT:    HPI:  Pt is an 88 y/o woman with PMHx of CAD s/p stents, CKD II who  presents via EMS after LOC. She had a syncopal episode this morning at the kitchen table while sitting in her chair some time after breakfast.   Has not been eating well,generally debilitated   She was  unaware that she syncopized. She c/o generalized soreness from falling.    She also slipped off her bed, landing on her bottom 4 days ago.  She reports she has not eaten very much in the last few days.   She reported recent weakness and cold sx a  few days ago.  She denies fever/chills/cpain, SOB, dizziness.        PAST MEDICAL/SURG. HX:    Hypertension,   CKD II  CAD,  PCI.    SOCIAL HX:  She is a nonsmoker.  No history of alcohol or drug  abuse.  She is .  Currently, she lives at home with family and takes care of her .    FAMILY HX:  Noncontributory.    Vitals  T(F): 97.7  HR: 61 (52 - 61)  BP: 146/69 (117/69 - 154/71)    RR: 17 (16 - 17) SpO2: 100% (100% - 100%) (13 Feb 2017 20:49)      PAST MEDICAL & SURGICAL HISTORY:  CKD (chronic kidney disease)  CAD (coronary artery disease)  H/O oophorectomy  No significant past surgical history      Allergies    No Known Allergies        SOCIAL HISTORY:  non smoker    FAMILY HISTORY:  No pertinent family history in first degree relatives      MEDICATIONS:  MEDICATIONS  (STANDING):  sodium chloride 0.9% lock flush 3milliLiter(s) IV Push once  docusate sodium 100milliGRAM(s) Oral two times a day  heparin  Injectable 5000Unit(s) SubCutaneous every 12 hours  atorvastatin 10milliGRAM(s) Oral at bedtime  clopidogrel Tablet 75milliGRAM(s) Oral daily  metoprolol succinate ER 100milliGRAM(s) Oral daily  amLODIPine   Tablet 2.5milliGRAM(s) Oral daily  aspirin  chewable 81milliGRAM(s) Oral daily  oseltamivir 30milliGRAM(s) Oral daily  sodium chloride 0.45%. 1000milliLiter(s) IV Continuous <Continuous>  cholecalciferol 1000Unit(s) Oral daily    MEDICATIONS  (PRN):  acetaminophen   Tablet. 650milliGRAM(s) Oral every 6 hours PRN Mild Pain (1 - 3)      REVIEW OF SYSTEMS:    CONSTITUTIONAL: No weakness, fevers or chills  EYES/ENT: No visual changes;  No vertigo or throat pain   NECK: No pain or stiffness  RESPIRATORY: No cough, wheezing, hemoptysis; No shortness of breath  CARDIOVASCULAR: No chest pain or palpitations  GASTROINTESTINAL: No abdominal or epigastric pain. No nausea, vomiting, or hematemesis; No diarrhea or constipation. No melena or hematochezia.  GENITOURINARY: No dysuria, frequency or hematuria  NEUROLOGICAL: No numbness or weakness  SKIN: No itching, burning, rashes, or lesions   All other review of systems is negative unless indicated above    Vital Signs Last 24 Hrs  T(C): 36.4, Max: 36.9 (02-15 @ 06:20)  T(F): 97.6, Max: 98.4 (02-15 @ 06:20)  HR: 67 (58 - 68)  BP: 122/89 (122/89 - 165/89)  BP(mean): --  RR: 18 (18 - 18)  SpO2: 95% (95% - 98%)    I&O's Summary    I & Os for current day (as of 15 Feb 2017 12:53)  =============================================  IN: 999 ml / OUT: 0 ml / NET: 999 ml      PHYSICAL EXAM:    Constitutional: NAD, awake and alert, well-developed  HEENT: PERR, EOMI,  No oral cyananosis.  Neck:  supple,  No JVD  Respiratory: Breath sounds are clear bilaterally, No wheezing, rales or rhonchi  Cardiovascular: S1 and S2, regular rate and rhythm, no Murmurs, gallops or rubs  Gastrointestinal: Bowel Sounds present, soft, nontender.   Extremities: No peripheral edema. No clubbing or cyanosis.  Vascular: 2+ peripheral pulses  Neurological: A/O x 3, no focal deficits  Musculoskeletal: no calf tenderness.  Skin: No rashes.      LABS: All Labs Reviewed:                        10.4   5.7   )-----------( 175      ( 15 Feb 2017 06:19 )             30.6                         10.6   5.2   )-----------( 172      ( 14 Feb 2017 05:21 )             32.5                         11.8   6.3   )-----------( 159      ( 13 Feb 2017 19:19 )             33.2     15 Feb 2017 06:19    139    |  110    |  50     ----------------------------<  79     3.6     |  18     |  2.70   14 Feb 2017 05:21    138    |  107    |  61     ----------------------------<  77     3.7     |  18     |  3.00   13 Feb 2017 19:19    138    |  105    |  62     ----------------------------<  95     3.9     |  20     |  3.24     Ca    7.7        15 Feb 2017 06:19  Ca    7.8        14 Feb 2017 05:21  Ca    7.9        13 Feb 2017 19:19  Phos  2.6       15 Feb 2017 06:19    TPro  x      /  Alb  2.8    /  TBili  x      /  DBili  x      /  AST  x      /  ALT  x      /  AlkPhos  x      15 Feb 2017 06:19  TPro  6.1    /  Alb  3.1    /  TBili  0.4    /  DBili  x      /  AST  33     /  ALT  15     /  AlkPhos  57     13 Feb 2017 19:19    PT/INR - ( 13 Feb 2017 19:19 )   PT: 11.0 sec;   INR: 1.00 ratio         PTT - ( 13 Feb 2017 19:19 )  PTT:24.7 sec  CARDIAC MARKERS ( 15 Feb 2017 06:19 )  0.024 ng/mL / x     / x     / x     / x      CARDIAC MARKERS ( 14 Feb 2017 01:24 )  0.021 ng/mL / x     / x     / x     / x      CARDIAC MARKERS ( 13 Feb 2017 19:19 )  0.017 ng/mL / x     / 159 U/L / x     / x        Troponin I, Serum (02.15.17 @ 06:19)    Troponin I, Serum: 0.024: High Sensitivity Troponin and new reference  range effective 7/6/2016 ng/mL    labRenal Panel (02.15.17 @ 06:19)    Albumin, Serum: 2.8 g/dL    Anion Gap, Serum: 11 mmol/L    Calcium, Total Serum: 7.7 mg/dL    Creatinine, Serum: 2.70 mg/dL    Potassium, Serum: 3.6 mmol/L    Blood Urea Nitrogen, Serum: 50 mg/dL    eGFR if African American: 17 mL/min/1.73M2    Sodium, Serum: 139 mmol/L    Carbon Dioxide, Serum: 18 mmol/L    Chloride, Serum: 110 mmol/L    eGFR if Non : 15: Interpretative comment  The units for eGFR are ml/min/1.73m2 (normalized body surface area). The  eGFR is calculated from a serum creatinine using the CKD-EPI equation.  Other variables required for calculation are race, age and sex. Among  patients w15: ith chronic kidney disease (CKD), the eGFR is useful in  determining the stage of disease according to KDOQI CKD classification.  All eGFR results are reported numerically with the following  interpretation.          GFR                    With15:                  Without     (ml/min/1.73 m2)    Kidney Damage       Kidney Damage        >= 90                    Stage 1                     Normal        60-89                    Stage 2                     Decreased GFR        :      Stage 3                     Stage 3        15-29                    Stage 4                     Stage 4        < 15                      Stage 5                     Stage 5  Each stage of CKD assumes that the associated GFR level has been in  eff15: ect for at least 3 months. Determination of stages one and two (with  eGFR > 59 ml/min/m2) requires estimation of kidney damage for at least 3  months as defined by structural or functional abnormalities.  Limitations: All estimates of GFR will be les15: s accurate for patients at  extremes of muscle mass (including but not limited to frail elderly,  critically ill, or cancer patients), those with unusual diets, and those  with conditions associated with reduced secretion or extrarenal  elimination of15:  creatinine. The eGFR equation is not recommended for use  in patients with unstable creatinine levels. mL/min/1.73M2    Glucose, Serum: 79 mg/dL    Phosphorus Level, Serum: 2.6 mg/dL  EKG:      Ventricular Rate 59 BPM    Atrial Rate 59 BPM    P-R Interval 138 ms    QRS Duration 92 ms    Q-T Interval 460 ms    QTC Calculation(Bezet) 455 ms    P Axis 42 degrees    R Axis -48 degrees    T Axis 8 degrees    Diagnosis Line Sinus bradycardia with APCs  Left axis deviation  Abnormal ECG  Anterior leads  Confirmed by ROMAN HASSAN MD [id number]) on 2/13/2017 8:19:05 PM

## 2017-02-16 VITALS
TEMPERATURE: 98 F | SYSTOLIC BLOOD PRESSURE: 154 MMHG | HEART RATE: 66 BPM | DIASTOLIC BLOOD PRESSURE: 82 MMHG | OXYGEN SATURATION: 100 % | WEIGHT: 114.64 LBS | RESPIRATION RATE: 16 BRPM

## 2017-02-16 LAB
ANION GAP SERPL CALC-SCNC: 13 MMOL/L — SIGNIFICANT CHANGE UP (ref 5–17)
BUN SERPL-MCNC: 42 MG/DL — HIGH (ref 7–23)
CALCIUM SERPL-MCNC: 8.5 MG/DL — SIGNIFICANT CHANGE UP (ref 8.5–10.1)
CHLORIDE SERPL-SCNC: 109 MMOL/L — HIGH (ref 96–108)
CO2 SERPL-SCNC: 19 MMOL/L — LOW (ref 22–31)
CREAT SERPL-MCNC: 2.71 MG/DL — HIGH (ref 0.5–1.3)
FOLATE RBC-MCNC: 1394 NG/ML — SIGNIFICANT CHANGE UP (ref 499–1504)
GLUCOSE SERPL-MCNC: 78 MG/DL — SIGNIFICANT CHANGE UP (ref 70–99)
POTASSIUM SERPL-MCNC: 4 MMOL/L — SIGNIFICANT CHANGE UP (ref 3.5–5.3)
POTASSIUM SERPL-SCNC: 4 MMOL/L — SIGNIFICANT CHANGE UP (ref 3.5–5.3)
SODIUM SERPL-SCNC: 141 MMOL/L — SIGNIFICANT CHANGE UP (ref 135–145)

## 2017-02-16 RX ADMIN — Medication 81 MILLIGRAM(S): at 12:27

## 2017-02-16 RX ADMIN — Medication 100 MILLIGRAM(S): at 06:35

## 2017-02-16 RX ADMIN — CLOPIDOGREL BISULFATE 75 MILLIGRAM(S): 75 TABLET, FILM COATED ORAL at 12:26

## 2017-02-16 RX ADMIN — Medication 30 MILLIGRAM(S): at 12:26

## 2017-02-16 RX ADMIN — HEPARIN SODIUM 5000 UNIT(S): 5000 INJECTION INTRAVENOUS; SUBCUTANEOUS at 06:35

## 2017-02-16 RX ADMIN — Medication 1000 UNIT(S): at 12:26

## 2017-02-16 RX ADMIN — AMLODIPINE BESYLATE 2.5 MILLIGRAM(S): 2.5 TABLET ORAL at 06:35

## 2017-02-16 NOTE — DISCHARGE NOTE ADULT - MEDICATION SUMMARY - MEDICATIONS TO TAKE
I will START or STAY ON the medications listed below when I get home from the hospital:    aspirin 81 mg oral tablet  -- 1 tab(s) by mouth once a day  -- Indication: For CAD (coronary artery disease)    LISINOPRIL 2.5 MG TABS  -- Indication: For CAD (coronary artery disease)    CLONIDINE HCL .1 MG TABS  -- Indication: For Hypertension    PRAVASTATIN SODIUM 10 MG TABS  -- Indication: For Cholesterol    CLOPIDOGREL 75 MG TABS  -- Indication: For CAD (coronary artery disease)    METOPROLOL SUCCINATE  MG TB24  -- Indication: For CAD (coronary artery disease)    AMLODIPINE BESYLATE 2.5 MG TABS  -- Indication: For Hypertension I will START or STAY ON the medications listed below when I get home from the hospital:    aspirin 81 mg oral tablet  -- 1 tab(s) by mouth once a day  -- Indication: For Heart health    LISINOPRIL 2.5 MG TABS  -- Indication: For Heart health    CLONIDINE HCL .1 MG TABS  -- Indication: For Heart health    PRAVASTATIN SODIUM 10 MG TABS  -- Indication: For Cholesterol    CLOPIDOGREL 75 MG TABS  -- Indication: For Heart health    Tamiflu 30 mg oral capsule  -- 1 cap(s) by mouth once a day (at bedtime) to start 2/17  -- Check with your doctor before becoming pregnant.  Finish all this medication unless otherwise directed by prescriber.    -- Indication: For Flu    METOPROLOL SUCCINATE  MG TB24  -- Indication: For Heart health    AMLODIPINE BESYLATE 2.5 MG TABS  -- Indication: For Heart health

## 2017-02-16 NOTE — PHYSICAL THERAPY INITIAL EVALUATION ADULT - MODALITIES TREATMENT COMMENTS
Patient returned to bed by request, call bell in reach and bed alarm active. Patient refused out of bed to chair c/o fatigue.

## 2017-02-16 NOTE — DISCHARGE NOTE ADULT - CARE PLAN
Principal Discharge DX:	Syncope, unspecified syncope type  Goal:	to not faint  Instructions for follow-up, activity and diet:	Take all medications as directed.  Stay hydrated.  Keep all follow up appointments  Secondary Diagnosis:	Influenza A virus present

## 2017-02-16 NOTE — DISCHARGE NOTE ADULT - HOSPITAL COURSE
88 yo woman with PMHx of CAD s/p stents, HTN,  CKD II admitted on 2/13/17 after syncope at home. Her son noted a fainting episode at the kitchen table in am while sitting in her chair some time after breakfast.  She was  unaware that she synopsized, but her son reports + LOC.  She c/o generalized soreness from falling.  She also slipped off her bed, landing on her bottom 4 days ago.  She reports she has not eaten very much in the last few days.   She reported recent weakness and cold sx a  few days ago.     She was admitted to telemetry for syncope evaluation. She tested + for influenza infection and was placed on Tamiflu. She was seen by renal, cardiology, and PT prior to discharge. She is medically stable for discharge home and in agreement with this plan. Her daughter is visiting from [place] and her son and  live with her.     roblem/Plan - 1:  Hospital Course by problem:    Syncope, suspect vasovagal  Ct head negative, non-focal exam  orthostatics ordered.  troponin  neg, telemetry unrevealing  blem/Plan - 2:  LEVI (acute kidney injury) on CKD stage IV in the setting of single kidney  -Cr 3.24 on arrival  -IV fluids  -Trend to 2.7 while hospitalized  -renal consult appreciated. follow up as outpatient    Problem/Plan - 3:  CAD (coronary artery disease).    c/w DAPT, statins, BB    Problem/Plan - 4:  Anemia  iron stuides, b12 per renal    Influenza A virus without sepsis  tamiflu 3/5 days, respiratory isolation.  encourage good handwashing/decreased outings while on tamiflu      Falls frequently.    PT consult recs no needs at this time.   Discuss with PCP    total time 45 minutes, including cooridnation of care    Gen-NAD  Eyes-eomi  Resp-unlabored  Card-no edema  Abd-thin  Neuro-awake, alert 88 yo woman with PMHx of CAD s/p stents, HTN,  CKD II admitted on 2/13/17 after syncope at home. Her son noted a fainting episode at the kitchen table in am while sitting in her chair some time after breakfast.  She was  unaware that she synopsized, but her son reports + LOC.  She c/o generalized soreness from falling.  She also slipped off her bed, landing on her bottom 4 days ago.  She reports she has not eaten very much in the last few days.   She reported recent weakness and cold sx a  few days ago.     She was admitted to telemetry for syncope evaluation. She tested + for influenza infection and was placed on Tamiflu. She was seen by renal, cardiology, and PT prior to discharge. She is medically stable for discharge home and in agreement with this plan. Her daughter is visiting from [place] and her son and  live with her.     roblem/Plan - 1:  Hospital Course by problem:    Syncope, suspect vasovagal  Ct head negative, non-focal exam  orthostatics ordered.  troponin  neg, telemetry unrevealing  blem/Plan - 2:  LEVI (acute kidney injury) on CKD stage IV in the setting of single kidney  -Cr 3.24 on arrival  -IV fluids  -Trend to 2.7 while hospitalized  -renal consult appreciated. follow up as outpatient    Problem/Plan - 3:  CAD (coronary artery disease).    c/w DAPT, statins, BB    Problem/Plan - 4:  Anemia  iron stuides, b12 per renal    Influenza A virus without sepsis  tamiflu 3/5 days, respiratory isolation.  encourage good handwashing/decreased outings while on tamiflu      Falls frequently.    PT consult recs no needs at this time.   Discuss with PCP    Cachexia with moderate protein malnutrition  Encourage PO intake    total time 45 minutes, including cooridnation of care    Gen-NAD  Eyes-eomi  Resp-unlabored  Card-no edema  Abd-thin  Neuro-awake, alert

## 2017-02-16 NOTE — DISCHARGE NOTE ADULT - PATIENT PORTAL LINK FT
“You can access the [facility] Patient Portal, offered by [facility], by registering with the following website: [email or web address]”

## 2017-02-16 NOTE — DISCHARGE NOTE ADULT - ADDITIONAL INSTRUCTIONS
PCP- 1 week for hospital follow up. You will need your kidney function checked at this visit. You should also have your orthostatic vital signs checked in relation to your frequent falls.  Cardiology 2-4 weeks for follow up on your fainting.

## 2017-02-16 NOTE — DISCHARGE NOTE ADULT - VISION (WITH CORRECTIVE LENSES IF THE PATIENT USUALLY WEARS THEM):
Normal vision: sees adequately in most situations; can see medication labels, newsprint/wears glasses for tv

## 2017-02-17 DIAGNOSIS — I25.10 ATHEROSCLEROTIC HEART DISEASE OF NATIVE CORONARY ARTERY WITHOUT ANGINA PECTORIS: ICD-10-CM

## 2017-02-17 DIAGNOSIS — N18.4 CHRONIC KIDNEY DISEASE, STAGE 4 (SEVERE): ICD-10-CM

## 2017-02-17 DIAGNOSIS — R55 SYNCOPE AND COLLAPSE: ICD-10-CM

## 2017-02-17 DIAGNOSIS — Z90.5 ACQUIRED ABSENCE OF KIDNEY: ICD-10-CM

## 2017-02-17 DIAGNOSIS — E86.0 DEHYDRATION: ICD-10-CM

## 2017-02-17 DIAGNOSIS — J10.1 INFLUENZA DUE TO OTHER IDENTIFIED INFLUENZA VIRUS WITH OTHER RESPIRATORY MANIFESTATIONS: ICD-10-CM

## 2017-02-17 DIAGNOSIS — I12.9 HYPERTENSIVE CHRONIC KIDNEY DISEASE WITH STAGE 1 THROUGH STAGE 4 CHRONIC KIDNEY DISEASE, OR UNSPECIFIED CHRONIC KIDNEY DISEASE: ICD-10-CM

## 2017-02-17 DIAGNOSIS — D64.9 ANEMIA, UNSPECIFIED: ICD-10-CM

## 2017-02-17 DIAGNOSIS — N17.9 ACUTE KIDNEY FAILURE, UNSPECIFIED: ICD-10-CM

## 2017-02-21 DIAGNOSIS — N18.3 CHRONIC KIDNEY DISEASE, STAGE 3 (MODERATE): ICD-10-CM

## 2017-02-21 DIAGNOSIS — E44.0 MODERATE PROTEIN-CALORIE MALNUTRITION: ICD-10-CM

## 2017-02-21 DIAGNOSIS — N17.9 ACUTE KIDNEY FAILURE, UNSPECIFIED: ICD-10-CM

## 2017-03-19 NOTE — DISCHARGE NOTE ADULT - CONDITIONS AT DISCHARGE
HPI Comments: 6:40 PM Eva Richardson is a 52 y.o. female with noted PMHx who presents to the ED c/o fever of 102-103 that began 3 days ago. The patient explains she was seen at [facility] and dx with bronchitis and a sinus infection and was Rx Augmentin and cough medication. She adds she has not been able to take her cough medication since she is allergic to Codeine. Pt says she had a negative flu swab. Pt also c/o left back and CP exacerbated by breathing and cough since yesterday. . Pt denies HA, and is not complaining of any other symptoms currently. There are no other concerns at this time. Past Medical History:   Diagnosis Date    GERD (gastroesophageal reflux disease)     Glaucoma     Hypertension     Nausea & vomiting     nausea       Past Surgical History:   Procedure Laterality Date    HX CHOLECYSTECTOMY      HX DILATION AND CURETTAGE  8/14    hysterectomy    HX HEENT      glaucoma    HX ORTHOPAEDIC      back         No family history on file. Social History     Social History    Marital status:      Spouse name: N/A    Number of children: N/A    Years of education: N/A     Occupational History    Not on file. Social History Main Topics    Smoking status: Never Smoker    Smokeless tobacco: Never Used    Alcohol use No    Drug use: Not on file    Sexual activity: Not Currently     Partners: Male     Other Topics Concern    Not on file     Social History Narrative         ALLERGIES: Codeine; Doxycycline; and Morphine    Review of Systems   Constitutional: Positive for activity change and fever. Negative for chills and fatigue. Positive for Malaise   HENT: Positive for congestion, rhinorrhea, sinus pressure and sore throat. Negative for ear discharge, ear pain, facial swelling, trouble swallowing and voice change. Eyes: Negative for visual disturbance. Respiratory: Positive for cough.  Negative for choking, chest tightness, shortness of breath, wheezing and stridor. Cardiovascular: Positive for chest pain (See HPI). Negative for palpitations and leg swelling. Left back and chest pain with coughing   Gastrointestinal: Negative for abdominal distention, abdominal pain, diarrhea, nausea and vomiting. Genitourinary: Negative. Negative for dysuria, flank pain, frequency and pelvic pain. Musculoskeletal: Positive for back pain and myalgias. Negative for gait problem, joint swelling and neck pain. Left back pain with cough and deep breath   Skin: Negative for rash. Neurological: Negative for dizziness, weakness, numbness and headaches. Psychiatric/Behavioral: Negative for confusion. All other systems reviewed and are negative. There were no vitals filed for this visit. Physical Exam   Constitutional: She is oriented to person, place, and time. She appears well-developed and well-nourished. No distress. HENT:   Head: Normocephalic and atraumatic. Right Ear: External ear normal.   Left Ear: External ear normal.   Mouth/Throat: Oropharynx is clear and moist.   Boggy nasal mucosa with clear rhinorrhea   Eyes: Conjunctivae and EOM are normal. Pupils are equal, round, and reactive to light. Right eye exhibits no discharge. Left eye exhibits no discharge. Neck: Normal range of motion. Neck supple. Cardiovascular: Normal rate, regular rhythm and normal heart sounds. Exam reveals no gallop and no friction rub. No murmur heard. Pulmonary/Chest: Effort normal and breath sounds normal. No stridor. No respiratory distress. She has no wheezes. She has no rales. She exhibits no tenderness. Abdominal: Soft. Bowel sounds are normal. She exhibits no distension and no mass. There is no tenderness. There is no rebound and no guarding. Musculoskeletal: Normal range of motion. She exhibits no edema or tenderness. Lymphadenopathy:     She has no cervical adenopathy. Neurological: She is alert and oriented to person, place, and time. She exhibits normal muscle tone. Coordination normal.   Skin: Skin is warm and dry. She is not diaphoretic. Psychiatric: She has a normal mood and affect. Her behavior is normal. Judgment and thought content normal.   Nursing note and vitals reviewed. MDM  Number of Diagnoses or Management Options  Diagnosis management comments: Initial EKG interp by Dr. Dayanna King- CLIFFORD with rate of 81, normal t wave and ST segments, similar to previous EKG of 2-18-15. Pt has flu appearance which is supported with the WBC of 3.3. Perc and Well's negative, but mildly + d-dimer. CTA of chest is pending. Pt treated for hypokalemia. 11:02 PM  CTA essentially negative except for atelectasis per report. Repeat Trop continues negative. Impression-viral syndrome. Pt instructed that she can discontinue her Augmentin. Rxn for Ibuprofen and Tessalon given        Amount and/or Complexity of Data Reviewed  Clinical lab tests: ordered and reviewed  Tests in the radiology section of CPT®: ordered and reviewed    Risk of Complications, Morbidity, and/or Mortality  Presenting problems: moderate  Diagnostic procedures: moderate  Management options: moderate    Patient Progress  Patient progress: stable    ED Course       Procedures                       Diagnosis:   1. Viral syndrome    2. Hypokalemia          Disposition: home      Follow-up Information     Follow up With Details Comments MD Alexa In 3 days  [address] [facility] [address] [place] [facility] EMERGENCY DEPT  If symptoms worsen [address] [place] [id number] [phone]          Patient's Medications   Start Taking    BENZONATATE (TESSALON PERLES) 100 MG CAPSULE    Take 1 Cap by mouth three (3) times daily as needed for Cough for up to 7 days. IBUPROFEN (MOTRIN) 600 MG TABLET    Take 1 Tab by mouth every six (6) hours as needed for Pain.    Continue Taking    ALPRAZOLAM (XANAX) 0.25 MG TABLET    Take 1 Tab by mouth nightly as needed for Anxiety. Max Daily Amount: 0.25 mg. CETIRIZINE (ZYRTEC) 10 MG CAP    Take 10 mg by mouth daily. DICYCLOMINE (BENTYL) 20 MG TABLET    take 1 tablet by mouth once daily    NIFEDICAL XL 60 MG ER TABLET    take 1 tablet by mouth once daily    OMEPRAZOLE DELAYED RELEASE (PRILOSEC D/R) 20 MG TABLET    Take 1 Tab by mouth daily. SERTRALINE (ZOLOFT) 25 MG TABLET    take 1 tablet by mouth once daily   These Medications have changed    No medications on file   Stop Taking    INDOMETHACIN (INDOCIN) 50 MG CAPSULE    Take 1 Cap by mouth as needed. stable

## 2018-04-13 PROBLEM — I25.10 ATHEROSCLEROTIC HEART DISEASE OF NATIVE CORONARY ARTERY WITHOUT ANGINA PECTORIS: Chronic | Status: ACTIVE | Noted: 2017-02-13

## 2018-04-13 PROBLEM — N18.9 CHRONIC KIDNEY DISEASE, UNSPECIFIED: Chronic | Status: ACTIVE | Noted: 2017-02-13

## 2018-04-19 ENCOUNTER — OUTPATIENT (OUTPATIENT)
Dept: OUTPATIENT SERVICES | Facility: HOSPITAL | Age: 83
LOS: 1 days | Discharge: HOME | End: 2018-04-19
Payer: MEDICARE

## 2018-04-19 DIAGNOSIS — N18.6 END STAGE RENAL DISEASE: ICD-10-CM

## 2018-04-19 DIAGNOSIS — Z01.818 ENCOUNTER FOR OTHER PREPROCEDURAL EXAMINATION: ICD-10-CM

## 2018-04-19 DIAGNOSIS — D63.1 ANEMIA IN CHRONIC KIDNEY DISEASE: ICD-10-CM

## 2018-04-19 DIAGNOSIS — I12.0 HYPERTENSIVE CHRONIC KIDNEY DISEASE WITH STAGE 5 CHRONIC KIDNEY DISEASE OR END STAGE RENAL DISEASE: ICD-10-CM

## 2018-04-19 DIAGNOSIS — E87.70 FLUID OVERLOAD, UNSPECIFIED: ICD-10-CM

## 2018-04-19 DIAGNOSIS — Z98.890 OTHER SPECIFIED POSTPROCEDURAL STATES: Chronic | ICD-10-CM

## 2018-04-19 DIAGNOSIS — R55 SYNCOPE AND COLLAPSE: ICD-10-CM

## 2018-04-19 DIAGNOSIS — D62 ACUTE POSTHEMORRHAGIC ANEMIA: ICD-10-CM

## 2018-04-19 DIAGNOSIS — Z90.721 ACQUIRED ABSENCE OF OVARIES, UNILATERAL: Chronic | ICD-10-CM

## 2018-04-19 DIAGNOSIS — Z95.5 PRESENCE OF CORONARY ANGIOPLASTY IMPLANT AND GRAFT: Chronic | ICD-10-CM

## 2018-04-19 DIAGNOSIS — I25.10 ATHEROSCLEROTIC HEART DISEASE OF NATIVE CORONARY ARTERY WITHOUT ANGINA PECTORIS: ICD-10-CM

## 2018-04-19 LAB
ABO RH CONFIRMATION: SIGNIFICANT CHANGE UP
ANION GAP SERPL CALC-SCNC: 12 MMOL/L — SIGNIFICANT CHANGE UP (ref 5–17)
APPEARANCE UR: CLEAR — SIGNIFICANT CHANGE UP
APTT BLD: 28.3 SEC — SIGNIFICANT CHANGE UP (ref 27.5–37.4)
BACTERIA # UR AUTO: (no result)
BASOPHILS # BLD AUTO: 0.07 K/UL — SIGNIFICANT CHANGE UP (ref 0–0.2)
BASOPHILS NFR BLD AUTO: 0.9 % — SIGNIFICANT CHANGE UP (ref 0–2)
BILIRUB UR-MCNC: NEGATIVE — SIGNIFICANT CHANGE UP
BLD GP AB SCN SERPL QL: SIGNIFICANT CHANGE UP
BUN SERPL-MCNC: 74 MG/DL — HIGH (ref 7–23)
CALCIUM SERPL-MCNC: 8.3 MG/DL — LOW (ref 8.5–10.1)
CHLORIDE SERPL-SCNC: 109 MMOL/L — HIGH (ref 96–108)
CO2 SERPL-SCNC: 19 MMOL/L — LOW (ref 22–31)
COLOR SPEC: YELLOW — SIGNIFICANT CHANGE UP
COMMENT - URINE: SIGNIFICANT CHANGE UP
CREAT SERPL-MCNC: 5.07 MG/DL — HIGH (ref 0.5–1.3)
DIFF PNL FLD: (no result)
EOSINOPHIL # BLD AUTO: 0 K/UL — SIGNIFICANT CHANGE UP (ref 0–0.5)
EOSINOPHIL NFR BLD AUTO: 0 % — SIGNIFICANT CHANGE UP (ref 0–6)
EPI CELLS # UR: SIGNIFICANT CHANGE UP
GLUCOSE SERPL-MCNC: 97 MG/DL — SIGNIFICANT CHANGE UP (ref 70–99)
GLUCOSE UR QL: 50 MG/DL
HCT VFR BLD CALC: 29.6 % — LOW (ref 34.5–45)
HGB BLD-MCNC: 9.6 G/DL — LOW (ref 11.5–15.5)
HYALINE CASTS # UR AUTO: (no result) /LPF
IMM GRANULOCYTES NFR BLD AUTO: 0.4 % — SIGNIFICANT CHANGE UP (ref 0–1.5)
INR BLD: 0.99 RATIO — SIGNIFICANT CHANGE UP (ref 0.88–1.16)
KETONES UR-MCNC: NEGATIVE — SIGNIFICANT CHANGE UP
LEUKOCYTE ESTERASE UR-ACNC: (no result)
LYMPHOCYTES # BLD AUTO: 1.78 K/UL — SIGNIFICANT CHANGE UP (ref 1–3.3)
LYMPHOCYTES # BLD AUTO: 22.2 % — SIGNIFICANT CHANGE UP (ref 13–44)
MCHC RBC-ENTMCNC: 30 PG — SIGNIFICANT CHANGE UP (ref 27–34)
MCHC RBC-ENTMCNC: 32.4 GM/DL — SIGNIFICANT CHANGE UP (ref 32–36)
MCV RBC AUTO: 92.5 FL — SIGNIFICANT CHANGE UP (ref 80–100)
MONOCYTES # BLD AUTO: 0.72 K/UL — SIGNIFICANT CHANGE UP (ref 0–0.9)
MONOCYTES NFR BLD AUTO: 9 % — SIGNIFICANT CHANGE UP (ref 2–14)
NEUTROPHILS # BLD AUTO: 5.41 K/UL — SIGNIFICANT CHANGE UP (ref 1.8–7.4)
NEUTROPHILS NFR BLD AUTO: 67.5 % — SIGNIFICANT CHANGE UP (ref 43–77)
NITRITE UR-MCNC: NEGATIVE — SIGNIFICANT CHANGE UP
NRBC # BLD: 0 /100 WBCS — SIGNIFICANT CHANGE UP (ref 0–0)
PH UR: 6 — SIGNIFICANT CHANGE UP (ref 5–8)
PLATELET # BLD AUTO: 261 K/UL — SIGNIFICANT CHANGE UP (ref 150–400)
POTASSIUM SERPL-MCNC: 4.3 MMOL/L — SIGNIFICANT CHANGE UP (ref 3.5–5.3)
POTASSIUM SERPL-SCNC: 4.3 MMOL/L — SIGNIFICANT CHANGE UP (ref 3.5–5.3)
PROT UR-MCNC: 500 MG/DL
PROTHROM AB SERPL-ACNC: 10.7 SEC — SIGNIFICANT CHANGE UP (ref 9.8–12.7)
RBC # BLD: 3.2 M/UL — LOW (ref 3.8–5.2)
RBC # FLD: 13.5 % — SIGNIFICANT CHANGE UP (ref 10.3–14.5)
RBC CASTS # UR COMP ASSIST: SIGNIFICANT CHANGE UP /HPF (ref 0–4)
SODIUM SERPL-SCNC: 140 MMOL/L — SIGNIFICANT CHANGE UP (ref 135–145)
SP GR SPEC: 1.01 — SIGNIFICANT CHANGE UP (ref 1.01–1.02)
TYPE + AB SCN PNL BLD: SIGNIFICANT CHANGE UP
UROBILINOGEN FLD QL: NEGATIVE MG/DL — SIGNIFICANT CHANGE UP
WBC # BLD: 8.01 K/UL — SIGNIFICANT CHANGE UP (ref 3.8–10.5)
WBC # FLD AUTO: 8.01 K/UL — SIGNIFICANT CHANGE UP (ref 3.8–10.5)
WBC UR QL: SIGNIFICANT CHANGE UP

## 2018-04-19 PROCEDURE — 93010 ELECTROCARDIOGRAM REPORT: CPT

## 2018-04-19 PROCEDURE — 71046 X-RAY EXAM CHEST 2 VIEWS: CPT | Mod: 26

## 2018-04-19 NOTE — ASU PATIENT PROFILE, ADULT - PSH
H/O abdominal surgery  for a colon cyst in the 1940's  H/O oophorectomy    Stented coronary artery  unable to recall date

## 2018-04-19 NOTE — CHART NOTE - NSCHARTNOTEFT_GEN_A_CORE
Vital Signs: Height 5' 3.5, Weight 117 pounds ( 53.2 Kg), B/P 172/67, HR, 54, Resp 18, Temp 97.6F, O2 Sat 100% on room air    Patient instructed on     1. NPO post midnight of surgery  2. On the use of EZ sponges  3. Aware that she needs medical clearance (has medical clearance appointment with Dr. Campos)   4. May take Amlodipine and Sodium Bicarb with a sip of water on morning of procedure Vital Signs: Height 5' 3.5", Weight 117 pounds ( 53.2 Kg), B/P 172/67, HR, 54, Resp 18, Temp 97.6F, O2 Sat 100% on room air    Patient and her son Dru were instructed on     1. NPO post midnight of surgery  2. On the use of EZ sponges  3. Aware that she needs medical clearance (has medical clearance appointment with Dr. Campos)   4. May take Amlodipine and Sodium Bicarb with a sip of water on morning of procedure

## 2018-04-19 NOTE — ASU PATIENT PROFILE, ADULT - PMH
CAD (coronary artery disease)    CKD (chronic kidney disease)    HLD (hyperlipidemia)    HTN (hypertension)    Stented coronary artery

## 2018-04-25 RX ORDER — TRAMADOL HYDROCHLORIDE 50 MG/1
50 TABLET ORAL ONCE
Qty: 0 | Refills: 0 | Status: DISCONTINUED | OUTPATIENT
Start: 2018-04-26 | End: 2018-04-26

## 2018-04-26 ENCOUNTER — OUTPATIENT (OUTPATIENT)
Dept: OUTPATIENT SERVICES | Facility: HOSPITAL | Age: 83
LOS: 1 days | Discharge: HOME | End: 2018-04-26

## 2018-04-26 VITALS
SYSTOLIC BLOOD PRESSURE: 172 MMHG | DIASTOLIC BLOOD PRESSURE: 83 MMHG | OXYGEN SATURATION: 99 % | RESPIRATION RATE: 16 BRPM | TEMPERATURE: 98 F | HEART RATE: 59 BPM | HEIGHT: 65 IN | WEIGHT: 118.17 LBS

## 2018-04-26 VITALS
OXYGEN SATURATION: 100 % | DIASTOLIC BLOOD PRESSURE: 79 MMHG | HEART RATE: 66 BPM | RESPIRATION RATE: 16 BRPM | SYSTOLIC BLOOD PRESSURE: 162 MMHG | TEMPERATURE: 98 F

## 2018-04-26 DIAGNOSIS — Z90.721 ACQUIRED ABSENCE OF OVARIES, UNILATERAL: Chronic | ICD-10-CM

## 2018-04-26 DIAGNOSIS — Z95.5 PRESENCE OF CORONARY ANGIOPLASTY IMPLANT AND GRAFT: Chronic | ICD-10-CM

## 2018-04-26 DIAGNOSIS — Z98.890 OTHER SPECIFIED POSTPROCEDURAL STATES: Chronic | ICD-10-CM

## 2018-04-26 LAB
ANION GAP SERPL CALC-SCNC: 9 MMOL/L — SIGNIFICANT CHANGE UP (ref 5–17)
BUN SERPL-MCNC: 66 MG/DL — HIGH (ref 7–23)
CALCIUM SERPL-MCNC: 8.5 MG/DL — SIGNIFICANT CHANGE UP (ref 8.5–10.1)
CHLORIDE SERPL-SCNC: 109 MMOL/L — HIGH (ref 96–108)
CO2 SERPL-SCNC: 22 MMOL/L — SIGNIFICANT CHANGE UP (ref 22–31)
CREAT SERPL-MCNC: 5.06 MG/DL — HIGH (ref 0.5–1.3)
GLUCOSE SERPL-MCNC: 89 MG/DL — SIGNIFICANT CHANGE UP (ref 70–99)
POTASSIUM SERPL-MCNC: 4.9 MMOL/L — SIGNIFICANT CHANGE UP (ref 3.5–5.3)
POTASSIUM SERPL-SCNC: 4.9 MMOL/L — SIGNIFICANT CHANGE UP (ref 3.5–5.3)
SODIUM SERPL-SCNC: 140 MMOL/L — SIGNIFICANT CHANGE UP (ref 135–145)

## 2018-04-26 RX ORDER — ONDANSETRON 8 MG/1
4 TABLET, FILM COATED ORAL ONCE
Qty: 0 | Refills: 0 | Status: DISCONTINUED | OUTPATIENT
Start: 2018-04-26 | End: 2018-04-26

## 2018-04-26 RX ORDER — ASPIRIN/CALCIUM CARB/MAGNESIUM 324 MG
81 TABLET ORAL DAILY
Qty: 0 | Refills: 0 | Status: DISCONTINUED | OUTPATIENT
Start: 2018-04-26 | End: 2018-05-11

## 2018-04-26 RX ORDER — SODIUM BICARBONATE 1 MEQ/ML
325 SYRINGE (ML) INTRAVENOUS
Qty: 0 | Refills: 0 | Status: DISCONTINUED | OUTPATIENT
Start: 2018-04-26 | End: 2018-05-11

## 2018-04-26 RX ORDER — FENTANYL CITRATE 50 UG/ML
25 INJECTION INTRAVENOUS
Qty: 0 | Refills: 0 | Status: DISCONTINUED | OUTPATIENT
Start: 2018-04-26 | End: 2018-04-26

## 2018-04-26 RX ORDER — METOPROLOL TARTRATE 50 MG
100 TABLET ORAL DAILY
Qty: 0 | Refills: 0 | Status: DISCONTINUED | OUTPATIENT
Start: 2018-04-26 | End: 2018-05-11

## 2018-04-26 RX ORDER — CLOPIDOGREL BISULFATE 75 MG/1
75 TABLET, FILM COATED ORAL DAILY
Qty: 0 | Refills: 0 | Status: DISCONTINUED | OUTPATIENT
Start: 2018-04-26 | End: 2018-05-11

## 2018-04-26 RX ORDER — SODIUM CHLORIDE 9 MG/ML
1000 INJECTION INTRAMUSCULAR; INTRAVENOUS; SUBCUTANEOUS
Qty: 0 | Refills: 0 | Status: DISCONTINUED | OUTPATIENT
Start: 2018-04-26 | End: 2018-04-26

## 2018-04-26 RX ORDER — AMLODIPINE BESYLATE 2.5 MG/1
2.5 TABLET ORAL DAILY
Qty: 0 | Refills: 0 | Status: DISCONTINUED | OUTPATIENT
Start: 2018-04-26 | End: 2018-05-11

## 2018-04-26 NOTE — BRIEF OPERATIVE NOTE - PROCEDURE
<<-----Click on this checkbox to enter Procedure Arteriovenous anastomosis  04/26/2018  R braciocephalic arteriovenous fistula  Active  BARBARA

## 2018-04-26 NOTE — ASU PATIENT PROFILE, ADULT - PSH
ambulatory H/O abdominal surgery  for a colon cyst in the 1940's  H/O oophorectomy    Stented coronary artery  unable to recall date

## 2018-04-26 NOTE — ASU DISCHARGE PLAN (ADULT/PEDIATRIC). - NOTIFY
Persistent Nausea and Vomiting/Fever greater than 101/Bleeding that does not stop/Swelling that continues/Numbness, tingling/Numbness, color, or temperature change to extremity

## 2018-04-26 NOTE — ASU DISCHARGE PLAN (ADULT/PEDIATRIC). - MEDICATION SUMMARY - MEDICATIONS TO TAKE
I will START or STAY ON the medications listed below when I get home from the hospital:    aspirin 81 mg oral tablet  -- 1 tab(s) by mouth once a day  -- Indication: For END STAGE RENAL DISEASE    sodium bicarbonate 325 mg oral tablet  -- orally 2 times a day  -- Indication: For END STAGE RENAL DISEASE    CLONIDINE HCL .1 MG TABS  -- 1  by mouth once a day (at bedtime)  -- Indication: For END STAGE RENAL DISEASE    CLOPIDOGREL 75 MG TABS  -- 1  by mouth once a day  -- Indication: For END STAGE RENAL DISEASE    METOPROLOL SUCCINATE  MG TB24  -- 1  by mouth once a day (at bedtime)  -- Indication: For END STAGE RENAL DISEASE    AMLODIPINE BESYLATE 2.5 MG TABS  -- 1  by mouth once a day  -- Indication: For END STAGE RENAL DISEASE

## 2018-04-26 NOTE — ASU PATIENT PROFILE, ADULT - CLICK TO LAUNCH ORM
[facility], Emergency Department    [address] [facility] [address]    Phone:  [phone]                                       Jonathan Dolan   MRN: [id number]    Department:  [facility], Emergency Department   Date of Visit:  9/29/2017           Patient Information     Date Of Birth          1998        Your diagnoses for this visit were:     Headache, unspecified headache type        You were seen by Yue Polk MD.        Discharge Instructions       TODAY'S VISIT:  You were seen today for a 1 month history of ongoing headache.   - The cause of your symptoms is not yet known, though your laboratory studies and head/neck imaging were found to be generally reassuring.   - That said, given that we do not know why you have your symptoms yet at this point we think it is very important for you to follow up closely with your Primary Care team in the and that you may need further testing such as an MRI or even specialty referral to Neurology, or alternatively they may just try to manage your symptoms and watch more how you do as opposed to formal tests or images. That all can be discussed further when you follow-up with your Primary Care team.     FOLLOW-UP:  Please make an appointment to follow up with:  - Your Primary Care Provider within a week. Return to the Emergency Department for new or worsening symptoms prior to the follow-up appointment.    PRESCRIPTIONS / MEDICATIONS:  -Try to avoid daily use of over-the-counter pain medications as this can exacerbate and prolonged headaches, and if possible only use these medications a few times a week as needed.  --- You can use Ibuprofen (600mg up to every 6-8 hours) and/or Tylenol/acetaminophen (1000mg up to every 8 hours) as needed for pain/symptom control.     OTHER INSTRUCTIONS:  - Do your best to stay hydrated.     RETURN TO THE EMERGENCY DEPARTMENT  Return to the Emergency Department at any time for new/worsening symptoms.        * HEADACHE  [unspecified]    The cause of your headache today is not clear.  Under stress, some people tense the muscles of their shoulder, neck and scalp without knowing it. If this condition lasts long enough, a TENSION HEADACHE can occur.  A MIGRAINE HEADACHE is caused by changes in blood flow to the brain. It can be mild or severe. A migraine attack may be triggered by emotional stress, hormone changes during the menstrual cycle, oral contraceptives, alcohol use, certain foods containing tyramine, eye strain, weather changes, missing meals, lack of sleep or oversleeping.  Other causes of headache include a viral illness, sinus, ear or throat infection, dental pain and TMJ (jaw joint) pain.  HOME CARE:    If you were given pain medicine for this headache, do not drive yourself home. Arrange for a ride, instead. When you get home, try to sleep. You should feel much better when you wake up.    If you are having nausea or vomiting, follow a light diet until your headache is relieved.    If you have a migraine type headache, use sunglasses when in the daylight or around bright indoor lighting until symptoms improve. Bright glaring light can worsen this kind of headache.  FOLLOW UP with your doctor if the headache is not better within the next 24 hours. If you have frequent headaches you should discuss a treatment plan with your primary care doctor. By being aware of the earliest signs of headache, and starting treatment right away, you may be able to stop the pain yourself.  GET PROMPT MEDICAL ATTENTION if any of the following occur:    Worsening of your head pain or no improvement within 24 hours    Repeated vomiting (unable to keep liquids down)    Fever over 101 F (38.3 C)    Stiff neck    Extreme drowsiness, confusion or fainting    Weakness of an arm or leg or one side of the face    Difficulty with speech or vision    [id number] Edgardo Pena, [address] [place] [address], [place] [address]. All rights reserved. This  information is not intended as a substitute for professional medical care. Always follow your healthcare professional's instructions.        24 Hour Appointment Hotline       To make an appointment at any [facility], call [phone] ([phone]). If you don't have a family doctor or clinic, we will help you find one. [facility] clinics are conveniently located to serve the needs of you and your family.             Review of your medicines      Our records show that you are taking the medicines listed below. If these are incorrect, please call your family doctor or clinic.        Dose / Directions Last dose taken    albuterol 108 (90 BASE) MCG/ACT Inhaler   Commonly known as:  VENTOLIN HFA   Dose:  2 puff   Quantity:  3 Inhaler        Inhale 2 puffs into the lungs every 6 hours as needed for shortness of breath / dyspnea   Refills:  3        beclomethasone 40 MCG/ACT Inhaler   Commonly known as:  QVAR   Dose:  2 puff   Quantity:  3 Inhaler        Inhale 2 puffs into the lungs 2 times daily   Refills:  3                Procedures and tests performed during your visit     Basic metabolic panel    CBC with platelets differential    CRP inflammation    CT Head Neck Angio w/o & w Contrast    Erythrocyte sedimentation rate auto    INR      Orders Needing Specimen Collection     None      Pending Results     Date and Time Order Name Status Description    9/29/2017 2225 CT Head Neck Angio w/o & w Contrast Preliminary             Pending Culture Results     No orders found for last 3 day(s).            Pending Results Instructions     If you had any lab results that were not finalized at the time of your Discharge, you can call the ED Lab Result RN at [phone]. You will be contacted by this team for any positive Lab results or changes in treatment. The nurses are available 7 days a week from 10A to 6:30P.  You can leave a message 24 hours per day and they will return your call.        Thank you for choosing  "[facility]       Thank you for choosing [facility] for your care. Our goal is always to provide you with excellent care. Hearing back from our patients is one way we can continue to improve our services. Please take a few minutes to complete the written survey that you may receive in the mail after you visit with us. Thank you!        [facility] Information     [employer] lets you send messages to your doctor, view your test results, renew your prescriptions, schedule appointments and more. To sign up, go to [email or web address] . Click on \"Log in\" on the left side of the screen, which will take you to the Welcome page. Then click on \"Sign up Now\" on the right side of the page.     You will be asked to enter the access code listed below, as well as some personal information. Please follow the directions to create your username and password.     Your access code is: [email or web address]  Expires: 2017  6:30 AM     Your access code will  in 90 days. If you need help or a new code, please call your [facility] clinic or [phone].        Care EveryWhere ID     This is your Care EveryWhere ID. This could be used by other organizations to access your [facility] medical records  [id number]        Equal Access to Services     JULIANO PEDRAZA : Zunilda Mckeon, warandallda momo, qaybta kaalmada adeemilio, alondra fritz. So [facility] [phone].    ATENCIÓN: Si habla español, tiene a alba disposición servicios gratuitos de asistencia lingüística. Llame al [phone].    We comply with applicable federal civil rights laws and [place] laws. We do not discriminate on the basis of race, color, national origin, age, disability, sex, sexual orientation, or gender identity.            After Visit Summary       This is your record. Keep this with you and show to your community pharmacist(s) and doctor(s) at your next visit.                  " .

## 2018-05-02 DIAGNOSIS — M10.9 GOUT, UNSPECIFIED: ICD-10-CM

## 2018-05-02 DIAGNOSIS — I25.10 ATHEROSCLEROTIC HEART DISEASE OF NATIVE CORONARY ARTERY WITHOUT ANGINA PECTORIS: ICD-10-CM

## 2018-05-02 DIAGNOSIS — Z79.02 LONG TERM (CURRENT) USE OF ANTITHROMBOTICS/ANTIPLATELETS: ICD-10-CM

## 2018-05-02 DIAGNOSIS — I12.0 HYPERTENSIVE CHRONIC KIDNEY DISEASE WITH STAGE 5 CHRONIC KIDNEY DISEASE OR END STAGE RENAL DISEASE: ICD-10-CM

## 2018-05-02 DIAGNOSIS — E78.00 PURE HYPERCHOLESTEROLEMIA, UNSPECIFIED: ICD-10-CM

## 2018-05-02 DIAGNOSIS — Z79.82 LONG TERM (CURRENT) USE OF ASPIRIN: ICD-10-CM

## 2018-05-02 DIAGNOSIS — N25.81 SECONDARY HYPERPARATHYROIDISM OF RENAL ORIGIN: ICD-10-CM

## 2018-05-02 DIAGNOSIS — N18.5 CHRONIC KIDNEY DISEASE, STAGE 5: ICD-10-CM

## 2018-05-02 DIAGNOSIS — N18.9 CHRONIC KIDNEY DISEASE, UNSPECIFIED: ICD-10-CM

## 2018-05-25 VITALS — SYSTOLIC BLOOD PRESSURE: 170 MMHG | WEIGHT: 120 LBS | DIASTOLIC BLOOD PRESSURE: 70 MMHG

## 2018-05-29 ENCOUNTER — INPATIENT (INPATIENT)
Facility: HOSPITAL | Age: 83
LOS: 4 days | Discharge: HOME | End: 2018-06-03
Attending: INTERNAL MEDICINE | Admitting: INTERNAL MEDICINE
Payer: MEDICARE

## 2018-05-29 VITALS — HEIGHT: 65 IN | WEIGHT: 119.93 LBS

## 2018-05-29 DIAGNOSIS — Z90.721 ACQUIRED ABSENCE OF OVARIES, UNILATERAL: Chronic | ICD-10-CM

## 2018-05-29 DIAGNOSIS — Z98.890 OTHER SPECIFIED POSTPROCEDURAL STATES: Chronic | ICD-10-CM

## 2018-05-29 DIAGNOSIS — Z95.5 PRESENCE OF CORONARY ANGIOPLASTY IMPLANT AND GRAFT: Chronic | ICD-10-CM

## 2018-05-29 LAB
ALBUMIN SERPL ELPH-MCNC: 3.5 G/DL — SIGNIFICANT CHANGE UP (ref 3.3–5)
ALP SERPL-CCNC: 61 U/L — SIGNIFICANT CHANGE UP (ref 40–120)
ALT FLD-CCNC: 14 U/L — SIGNIFICANT CHANGE UP (ref 12–78)
ANION GAP SERPL CALC-SCNC: 12 MMOL/L — SIGNIFICANT CHANGE UP (ref 5–17)
APTT BLD: 24.8 SEC — LOW (ref 27.5–37.4)
AST SERPL-CCNC: 23 U/L — SIGNIFICANT CHANGE UP (ref 15–37)
BASOPHILS # BLD AUTO: 0.06 K/UL — SIGNIFICANT CHANGE UP (ref 0–0.2)
BASOPHILS NFR BLD AUTO: 0.9 % — SIGNIFICANT CHANGE UP (ref 0–2)
BILIRUB SERPL-MCNC: 0.4 MG/DL — SIGNIFICANT CHANGE UP (ref 0.2–1.2)
BLD GP AB SCN SERPL QL: SIGNIFICANT CHANGE UP
BUN SERPL-MCNC: 71 MG/DL — HIGH (ref 7–23)
CALCIUM SERPL-MCNC: 8.7 MG/DL — SIGNIFICANT CHANGE UP (ref 8.5–10.1)
CHLORIDE SERPL-SCNC: 110 MMOL/L — HIGH (ref 96–108)
CO2 SERPL-SCNC: 19 MMOL/L — LOW (ref 22–31)
CREAT SERPL-MCNC: 5.23 MG/DL — HIGH (ref 0.5–1.3)
EOSINOPHIL # BLD AUTO: 0 K/UL — SIGNIFICANT CHANGE UP (ref 0–0.5)
EOSINOPHIL NFR BLD AUTO: 0 % — SIGNIFICANT CHANGE UP (ref 0–6)
GLUCOSE BLDC GLUCOMTR-MCNC: 135 MG/DL — HIGH (ref 70–99)
GLUCOSE SERPL-MCNC: 86 MG/DL — SIGNIFICANT CHANGE UP (ref 70–99)
HAV IGM SER-ACNC: SIGNIFICANT CHANGE UP
HBV CORE IGM SER-ACNC: SIGNIFICANT CHANGE UP
HBV SURFACE AG SER-ACNC: SIGNIFICANT CHANGE UP
HBV SURFACE AG SER-ACNC: SIGNIFICANT CHANGE UP
HCT VFR BLD CALC: 25.2 % — LOW (ref 34.5–45)
HCT VFR BLD CALC: 28 % — LOW (ref 34.5–45)
HCV AB S/CO SERPL IA: 0.15 S/CO — SIGNIFICANT CHANGE UP
HCV AB SERPL-IMP: SIGNIFICANT CHANGE UP
HGB BLD-MCNC: 8.4 G/DL — LOW (ref 11.5–15.5)
HGB BLD-MCNC: 9.3 G/DL — LOW (ref 11.5–15.5)
IMM GRANULOCYTES NFR BLD AUTO: 0.8 % — SIGNIFICANT CHANGE UP (ref 0–1.5)
INR BLD: 0.98 RATIO — SIGNIFICANT CHANGE UP (ref 0.88–1.16)
LYMPHOCYTES # BLD AUTO: 1.49 K/UL — SIGNIFICANT CHANGE UP (ref 1–3.3)
LYMPHOCYTES # BLD AUTO: 23.2 % — SIGNIFICANT CHANGE UP (ref 13–44)
MCHC RBC-ENTMCNC: 29.9 PG — SIGNIFICANT CHANGE UP (ref 27–34)
MCHC RBC-ENTMCNC: 30.2 PG — SIGNIFICANT CHANGE UP (ref 27–34)
MCHC RBC-ENTMCNC: 33.2 GM/DL — SIGNIFICANT CHANGE UP (ref 32–36)
MCHC RBC-ENTMCNC: 33.3 GM/DL — SIGNIFICANT CHANGE UP (ref 32–36)
MCV RBC AUTO: 90 FL — SIGNIFICANT CHANGE UP (ref 80–100)
MCV RBC AUTO: 90.6 FL — SIGNIFICANT CHANGE UP (ref 80–100)
MONOCYTES # BLD AUTO: 0.58 K/UL — SIGNIFICANT CHANGE UP (ref 0–0.9)
MONOCYTES NFR BLD AUTO: 9 % — SIGNIFICANT CHANGE UP (ref 2–14)
NEUTROPHILS # BLD AUTO: 4.24 K/UL — SIGNIFICANT CHANGE UP (ref 1.8–7.4)
NEUTROPHILS NFR BLD AUTO: 66.1 % — SIGNIFICANT CHANGE UP (ref 43–77)
NRBC # BLD: 0 /100 WBCS — SIGNIFICANT CHANGE UP (ref 0–0)
NRBC # BLD: 0 /100 WBCS — SIGNIFICANT CHANGE UP (ref 0–0)
PLATELET # BLD AUTO: 227 K/UL — SIGNIFICANT CHANGE UP (ref 150–400)
PLATELET # BLD AUTO: 233 K/UL — SIGNIFICANT CHANGE UP (ref 150–400)
POTASSIUM SERPL-MCNC: 4.5 MMOL/L — SIGNIFICANT CHANGE UP (ref 3.5–5.3)
POTASSIUM SERPL-SCNC: 4.5 MMOL/L — SIGNIFICANT CHANGE UP (ref 3.5–5.3)
PROT SERPL-MCNC: 7 GM/DL — SIGNIFICANT CHANGE UP (ref 6–8.3)
PROTHROM AB SERPL-ACNC: 10.6 SEC — SIGNIFICANT CHANGE UP (ref 9.8–12.7)
RBC # BLD: 2.78 M/UL — LOW (ref 3.8–5.2)
RBC # BLD: 3.11 M/UL — LOW (ref 3.8–5.2)
RBC # FLD: 13.1 % — SIGNIFICANT CHANGE UP (ref 10.3–14.5)
RBC # FLD: 13.2 % — SIGNIFICANT CHANGE UP (ref 10.3–14.5)
SODIUM SERPL-SCNC: 141 MMOL/L — SIGNIFICANT CHANGE UP (ref 135–145)
TROPONIN I SERPL-MCNC: <0.015 NG/ML — SIGNIFICANT CHANGE UP (ref 0.01–0.04)
TYPE + AB SCN PNL BLD: SIGNIFICANT CHANGE UP
WBC # BLD: 6.42 K/UL — SIGNIFICANT CHANGE UP (ref 3.8–10.5)
WBC # BLD: 9.55 K/UL — SIGNIFICANT CHANGE UP (ref 3.8–10.5)
WBC # FLD AUTO: 6.42 K/UL — SIGNIFICANT CHANGE UP (ref 3.8–10.5)
WBC # FLD AUTO: 9.55 K/UL — SIGNIFICANT CHANGE UP (ref 3.8–10.5)

## 2018-05-29 PROCEDURE — 99285 EMERGENCY DEPT VISIT HI MDM: CPT

## 2018-05-29 PROCEDURE — 71045 X-RAY EXAM CHEST 1 VIEW: CPT | Mod: 26,77

## 2018-05-29 PROCEDURE — 71045 X-RAY EXAM CHEST 1 VIEW: CPT | Mod: 26

## 2018-05-29 PROCEDURE — 93010 ELECTROCARDIOGRAM REPORT: CPT | Mod: 76

## 2018-05-29 RX ORDER — METOPROLOL TARTRATE 50 MG
100 TABLET ORAL DAILY
Qty: 0 | Refills: 0 | Status: DISCONTINUED | OUTPATIENT
Start: 2018-05-29 | End: 2018-06-03

## 2018-05-29 RX ORDER — OXYCODONE HYDROCHLORIDE 5 MG/1
5 TABLET ORAL EVERY 4 HOURS
Qty: 0 | Refills: 0 | Status: DISCONTINUED | OUTPATIENT
Start: 2018-05-29 | End: 2018-05-29

## 2018-05-29 RX ORDER — SODIUM CHLORIDE 9 MG/ML
1000 INJECTION INTRAMUSCULAR; INTRAVENOUS; SUBCUTANEOUS
Qty: 0 | Refills: 0 | Status: DISCONTINUED | OUTPATIENT
Start: 2018-05-29 | End: 2018-05-29

## 2018-05-29 RX ORDER — ONDANSETRON 8 MG/1
4 TABLET, FILM COATED ORAL ONCE
Qty: 0 | Refills: 0 | Status: DISCONTINUED | OUTPATIENT
Start: 2018-05-29 | End: 2018-05-29

## 2018-05-29 RX ORDER — ACETAMINOPHEN 500 MG
1000 TABLET ORAL ONCE
Qty: 0 | Refills: 0 | Status: DISCONTINUED | OUTPATIENT
Start: 2018-05-29 | End: 2018-05-29

## 2018-05-29 RX ORDER — ONDANSETRON 8 MG/1
4 TABLET, FILM COATED ORAL EVERY 6 HOURS
Qty: 0 | Refills: 0 | Status: DISCONTINUED | OUTPATIENT
Start: 2018-05-29 | End: 2018-06-03

## 2018-05-29 RX ORDER — FENTANYL CITRATE 50 UG/ML
50 INJECTION INTRAVENOUS
Qty: 0 | Refills: 0 | Status: DISCONTINUED | OUTPATIENT
Start: 2018-05-29 | End: 2018-05-29

## 2018-05-29 RX ORDER — MEPERIDINE HYDROCHLORIDE 50 MG/ML
12.5 INJECTION INTRAMUSCULAR; INTRAVENOUS; SUBCUTANEOUS
Qty: 0 | Refills: 0 | Status: DISCONTINUED | OUTPATIENT
Start: 2018-05-29 | End: 2018-05-29

## 2018-05-29 RX ORDER — SODIUM CHLORIDE 9 MG/ML
3 INJECTION INTRAMUSCULAR; INTRAVENOUS; SUBCUTANEOUS ONCE
Qty: 0 | Refills: 0 | Status: COMPLETED | OUTPATIENT
Start: 2018-05-29 | End: 2018-05-29

## 2018-05-29 RX ORDER — AMLODIPINE BESYLATE 2.5 MG/1
2.5 TABLET ORAL DAILY
Qty: 0 | Refills: 0 | Status: DISCONTINUED | OUTPATIENT
Start: 2018-05-29 | End: 2018-06-03

## 2018-05-29 RX ORDER — SODIUM BICARBONATE 1 MEQ/ML
325 SYRINGE (ML) INTRAVENOUS
Qty: 0 | Refills: 0 | Status: DISCONTINUED | OUTPATIENT
Start: 2018-05-29 | End: 2018-05-29

## 2018-05-29 RX ORDER — AMLODIPINE BESYLATE 2.5 MG/1
2.5 TABLET ORAL DAILY
Qty: 0 | Refills: 0 | Status: DISCONTINUED | OUTPATIENT
Start: 2018-05-29 | End: 2018-05-29

## 2018-05-29 RX ORDER — CLOPIDOGREL BISULFATE 75 MG/1
75 TABLET, FILM COATED ORAL DAILY
Qty: 0 | Refills: 0 | Status: DISCONTINUED | OUTPATIENT
Start: 2018-05-30 | End: 2018-06-03

## 2018-05-29 RX ORDER — SODIUM BICARBONATE 1 MEQ/ML
325 SYRINGE (ML) INTRAVENOUS
Qty: 0 | Refills: 0 | Status: DISCONTINUED | OUTPATIENT
Start: 2018-05-29 | End: 2018-05-30

## 2018-05-29 RX ADMIN — SODIUM CHLORIDE 3 MILLILITER(S): 9 INJECTION INTRAMUSCULAR; INTRAVENOUS; SUBCUTANEOUS at 08:46

## 2018-05-29 RX ADMIN — SODIUM CHLORIDE 50 MILLILITER(S): 9 INJECTION INTRAMUSCULAR; INTRAVENOUS; SUBCUTANEOUS at 18:07

## 2018-05-29 RX ADMIN — Medication 0.1 MILLIGRAM(S): at 22:15

## 2018-05-29 RX ADMIN — Medication 1.25 MILLIGRAM(S): at 12:23

## 2018-05-29 RX ADMIN — Medication 100 MILLIGRAM(S): at 13:44

## 2018-05-29 RX ADMIN — AMLODIPINE BESYLATE 2.5 MILLIGRAM(S): 2.5 TABLET ORAL at 13:44

## 2018-05-29 NOTE — BRIEF OPERATIVE NOTE - PROCEDURE
<<-----Click on this checkbox to enter Procedure Dialysis access placement  05/29/2018  R IJ Hemastar catheter (23 cm tip to cuff)  Active  BARBARA

## 2018-05-29 NOTE — ED ADULT TRIAGE NOTE - CHIEF COMPLAINT QUOTE
Rocío VALENZUELA sent to ed for new dialysis  catheter  to chest  pt feels dizzy .leg swelling/new fistula on rt arm x one month  not ready to use

## 2018-05-29 NOTE — CONSULT NOTE ADULT - ASSESSMENT
89 yo woman with CKD now progressed to end stage with moderate uremic symptoms.  AVF still immature for cannulation.  --ESRD : permcath placement and initiation of HD in am  --Anemia of CKD.  To be started on LEXX once HD starts.  --Fluid overload due to ESRD : UF with HD.  --HTN : monitor for adjustments of meds with initiation of dialysis.

## 2018-05-29 NOTE — PROGRESS NOTE ADULT - SUBJECTIVE AND OBJECTIVE BOX
called to evaluate bleeding from surgical site    S/P R IJ Hemastar catheter (23 cm tip to cuff)    Pt is awake and alert without complaints at this time    T(C): 36.2 (05-29-18 @ 18:52), Max: 37.2 (05-29-18 @ 07:05)  HR: 66 (05-29-18 @ 18:52) (60 - 73)  BP: 152/81 (05-29-18 @ 18:52) (149/86 - 187/73)  RR: 18 (05-29-18 @ 18:52) (14 - 21)  SpO2: 99% (05-29-18 @ 18:52) (95% - 99%)  Wt(kg): --    Chest:  tunnel cathedar Right anterior chest wall. localized ecchymosis without hematoma. bleeding from the insertion site with cessation upon applying pressure just above the insertion site.      Ass:  post-op bleeding S/P R IJ Hemastar catheter  Plan:  Using sterile technique, the dressing was taken down.  Surgicell was applied at the insertion site.  Pressure dressing applied to the tunneled catheder.  No bleeding noted.

## 2018-05-29 NOTE — CONSULT NOTE ADULT - SUBJECTIVE AND OBJECTIVE BOX
Consult Note:   · Provider Specialty	Vascular	    Referral/Consultation:   Initial Consult:  · Requested by Name:	Dr. Alford	  · Date/Time:	29-May-2018 	  · Reason for Referral/Consultation:	placement of dialysis catheter	      · Subjective and Objective: 	  Chief complaints.  Complains of very poor appetite for last few weeks.    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   CKD noted progressive for last few years.  Pt initially refused to consider HD.  However, she recently changed her mind due to her wishes to her wishes to continue to care for her  with Parkinson's disease at home.  Post R brachiocephalic AVF on Apr 26 placement   Seen in office on 5/26 and pt reported very minimal po intake due to very poor appetite since AVF placement one month ago.  Noted with new onset LE edema and increased creat to 5.  Therefore, pt now is to have Permcath placed for initiation of HD.    	  PMHX and PSHX.  1.HTN  2.CAD ( stent x1 in 2007)  3.Hx of Syncope      FAMILY HISTORY:  No pertinent family history in first degree relatives      SOCIAL HISTORY :  No current hx of smoking or ETOH  Allergies    sulfa drugs (Unknown)    REVIEW OF SYSTEMS :  Feeling weak.  Poor appetite.  Denies SOB  Denies chest discomfort;  Denies abdominal discomfort    Home Medications:   * No Current Medications as of 26-Apr-2018 10:52 documented in Structured Notes  · 	AMLODIPINE BESYLATE 2.5 MG TABS: 1  orally once a day  · 	CLONIDINE HCL .1 MG TABS: 1  orally once a day (at bedtime)  · 	CLOPIDOGREL 75 MG TABS: 1  orally once a day  · 	METOPROLOL SUCCINATE  MG TB24: 1  orally once a day (at bedtime)  · 	sodium bicarbonate 325 mg oral tablet: orally 2 times a day  · 	aspirin 81 mg oral tablet: 1 tab(s) orally once a day        MEDICATIONS  (STANDING):  amLODIPine   Tablet 2.5 milliGRAM(s) Oral daily  cloNIDine 0.1 milliGRAM(s) Oral at bedtime  metoprolol succinate  milliGRAM(s) Oral daily  sodium bicarbonate 325 milliGRAM(s) Oral two times a day    MEDICATIONS  (PRN):  aluminum hydroxide/magnesium hydroxide/simethicone Suspension 30 milliLiter(s) Oral every 4 hours PRN Dyspepsia  enalaprilat Injectable 1.25 milliGRAM(s) IV Push every 6 hours PRN SBP>170  ondansetron Injectable 4 milliGRAM(s) IV Push every 6 hours PRN Nausea         Vital Signs Last 24 Hrs  T(C): 36.9 (29 May 2018 10:24), Max: 37.2 (29 May 2018 07:05)  T(F): 98.5 (29 May 2018 10:24), Max: 98.9 (29 May 2018 07:05)  HR: 62 (29 May 2018 12:21) (60 - 62)  BP: 172/65 (29 May 2018 12:21) (172/65 - 179/77)  BP(mean): --  RR: 16 (29 May 2018 10:24) (16 - 16)  SpO2: 98% (29 May 2018 10:24) (96% - 98%)  Daily Height in cm: 165.1 (29 May 2018 07:01)    Daily   I&O's Summary      PHYSICAL EXAM:  Alert and approrpiate  GEN: No distress  HEENT: WNL  NECK : supple  CV: S1S2 RRR  LUNGS: clear to aus  ABD: soft  EXT: 2+ edema    R antecubital fossa with well healing incision and palpable thrill in antecubital fossa with dilatation of venous outflow tract (cephalic vein)    LABS:                        9.3    6.42  )-----------( 227      ( 29 May 2018 07:36 )             28.0     05-29    141  |  110<H>  |  71<H>  ----------------------------<  86  4.5   |  19<L>  |  5.23<H>    Ca    8.7      29 May 2018 07:36    TPro  7.0  /  Alb  3.5  /  TBili  0.4  /  DBili  x   /  AST  23  /  ALT  14  /  AlkPhos  61  05-29    PT/INR - ( 29 May 2018 07:36 )   PT: 10.6 sec;   INR: 0.98 ratio         PTT - ( 29 May 2018 07:36 )  PTT:24.8 sec            Assessment and Recommendation:   · Assessment		  89 yo woman with CKD now progressed to end stage with moderate uremic symptoms.  AVF still immature for cannulation.  --ESRD : hemastar catheter to be placed for initiation of dialysis  --Anemia of CKD.  To be started on LEXX once HD starts.  --Fluid overload due to ESRD : UF with HD.  --HTN : monitor for adjustments of meds with initiation of dialysis.
Chief complaints.  Complains of very poor appetite for last few weeks.    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   CKD noted progressive for last few years.  Pt initially refused to consider HD.  However, she recently changed her mind due to her wishes to her wishes to continue to care for her  with Parkinson's disease at home.  Post AVF placement with slow maturation.  Seen in office on 5/26 and pt reported very minimal po intake due to very poor appetite since AVF placement one month ago.  Noted with new onset LE edema and increased creat to 5.  Therefore, pt now is to have Permcath placed for initiation of HD.    	  PMHX and PSHX.  1.HTN  2.CAD ( stent x1 in 2007)  3.Hx of Syncope      FAMILY HISTORY:  No pertinent family history in first degree relatives      SOCIAL HISTORY :  No current hx of smoking or ETOH  Allergies    sulfa drugs (Unknown)    REVIEW OF SYSTEMS :  Feeling weak.  Poor appetite.  Denies SOB  Denies chest discomfort;  Denies abdominal discomfort    Home Medications:   * No Current Medications as of 26-Apr-2018 10:52 documented in Structured Notes  · 	AMLODIPINE BESYLATE 2.5 MG TABS: 1  orally once a day  · 	CLONIDINE HCL .1 MG TABS: 1  orally once a day (at bedtime)  · 	CLOPIDOGREL 75 MG TABS: 1  orally once a day  · 	METOPROLOL SUCCINATE  MG TB24: 1  orally once a day (at bedtime)  · 	sodium bicarbonate 325 mg oral tablet: orally 2 times a day  · 	aspirin 81 mg oral tablet: 1 tab(s) orally once a day        MEDICATIONS  (STANDING):  amLODIPine   Tablet 2.5 milliGRAM(s) Oral daily  cloNIDine 0.1 milliGRAM(s) Oral at bedtime  metoprolol succinate  milliGRAM(s) Oral daily  sodium bicarbonate 325 milliGRAM(s) Oral two times a day    MEDICATIONS  (PRN):  aluminum hydroxide/magnesium hydroxide/simethicone Suspension 30 milliLiter(s) Oral every 4 hours PRN Dyspepsia  enalaprilat Injectable 1.25 milliGRAM(s) IV Push every 6 hours PRN SBP>170  ondansetron Injectable 4 milliGRAM(s) IV Push every 6 hours PRN Nausea         Vital Signs Last 24 Hrs  T(C): 36.9 (29 May 2018 10:24), Max: 37.2 (29 May 2018 07:05)  T(F): 98.5 (29 May 2018 10:24), Max: 98.9 (29 May 2018 07:05)  HR: 62 (29 May 2018 12:21) (60 - 62)  BP: 172/65 (29 May 2018 12:21) (172/65 - 179/77)  BP(mean): --  RR: 16 (29 May 2018 10:24) (16 - 16)  SpO2: 98% (29 May 2018 10:24) (96% - 98%)  Daily Height in cm: 165.1 (29 May 2018 07:01)    Daily   I&O's Summary      PHYSICAL EXAM:  Alert and approrpiate  GEN: No distress  HEENT: WNL  NECK : supple  CV: S1S2 RRR  LUNGS: clear to aus  ABD: soft  EXT: 2+ edema    LABS:                        9.3    6.42  )-----------( 227      ( 29 May 2018 07:36 )             28.0     05-29    141  |  110<H>  |  71<H>  ----------------------------<  86  4.5   |  19<L>  |  5.23<H>    Ca    8.7      29 May 2018 07:36    TPro  7.0  /  Alb  3.5  /  TBili  0.4  /  DBili  x   /  AST  23  /  ALT  14  /  AlkPhos  61  05-29    PT/INR - ( 29 May 2018 07:36 )   PT: 10.6 sec;   INR: 0.98 ratio         PTT - ( 29 May 2018 07:36 )  PTT:24.8 sec

## 2018-05-29 NOTE — CHART NOTE - NSCHARTNOTEFT_GEN_A_CORE
Rapid response called for unresponsive pt. Pt was found in the restroom slumped over and unresponsive. Code blue called after unable to initially palpate pulse. When pt was transferred to bed, pt regained consciousness and pulse was found. Pt started on non-rebreather, VSS. Pt stated she was using the restroom when she became dizzy and lost consciousness.    PE  166/63  67  100%      GEN: Pt in bed, now responsive  CARDIO: S1S2+, no M/R/G  PULM: CTABL  NEURO: AOx3    #LoC, likely vasovagal event  - F/U CBC, BMP, Trop I  - EKG: NSR, no acute ST or T wave changes  - Transfer to tele for monitoring    Discussed w/ Dr Ash, Dr Shirley

## 2018-05-29 NOTE — ED PROVIDER NOTE - OBJECTIVE STATEMENT
Pt comes to the Ed complaining of needing admission for dialysis. Pt has renal failure and is still able to urinate. Pt states saw Dr. Alford Friday and was told to come ot the Ed on Tuesday for admission and placement of a shiley for dialysis to start. Pt has fistula to the left arm but it is not mature yet.

## 2018-05-30 LAB
ANION GAP SERPL CALC-SCNC: 13 MMOL/L — SIGNIFICANT CHANGE UP (ref 5–17)
ANION GAP SERPL CALC-SCNC: 14 MMOL/L — SIGNIFICANT CHANGE UP (ref 5–17)
BASOPHILS # BLD AUTO: 0.04 K/UL — SIGNIFICANT CHANGE UP (ref 0–0.2)
BASOPHILS NFR BLD AUTO: 0.5 % — SIGNIFICANT CHANGE UP (ref 0–2)
BUN SERPL-MCNC: 73 MG/DL — HIGH (ref 7–23)
BUN SERPL-MCNC: 75 MG/DL — HIGH (ref 7–23)
CALCIUM SERPL-MCNC: 8.1 MG/DL — LOW (ref 8.5–10.1)
CALCIUM SERPL-MCNC: 8.4 MG/DL — LOW (ref 8.5–10.1)
CHLORIDE SERPL-SCNC: 110 MMOL/L — HIGH (ref 96–108)
CHLORIDE SERPL-SCNC: 111 MMOL/L — HIGH (ref 96–108)
CO2 SERPL-SCNC: 18 MMOL/L — LOW (ref 22–31)
CO2 SERPL-SCNC: 18 MMOL/L — LOW (ref 22–31)
CREAT SERPL-MCNC: 5.19 MG/DL — HIGH (ref 0.5–1.3)
CREAT SERPL-MCNC: 5.23 MG/DL — HIGH (ref 0.5–1.3)
EOSINOPHIL # BLD AUTO: 0 K/UL — SIGNIFICANT CHANGE UP (ref 0–0.5)
EOSINOPHIL NFR BLD AUTO: 0 % — SIGNIFICANT CHANGE UP (ref 0–6)
GLUCOSE SERPL-MCNC: 111 MG/DL — HIGH (ref 70–99)
GLUCOSE SERPL-MCNC: 82 MG/DL — SIGNIFICANT CHANGE UP (ref 70–99)
HCT VFR BLD CALC: 24.7 % — LOW (ref 34.5–45)
HGB BLD-MCNC: 7.9 G/DL — LOW (ref 11.5–15.5)
IMM GRANULOCYTES NFR BLD AUTO: 0.5 % — SIGNIFICANT CHANGE UP (ref 0–1.5)
LYMPHOCYTES # BLD AUTO: 1.08 K/UL — SIGNIFICANT CHANGE UP (ref 1–3.3)
LYMPHOCYTES # BLD AUTO: 13.4 % — SIGNIFICANT CHANGE UP (ref 13–44)
MCHC RBC-ENTMCNC: 28.8 PG — SIGNIFICANT CHANGE UP (ref 27–34)
MCHC RBC-ENTMCNC: 32 GM/DL — SIGNIFICANT CHANGE UP (ref 32–36)
MCV RBC AUTO: 90.1 FL — SIGNIFICANT CHANGE UP (ref 80–100)
MONOCYTES # BLD AUTO: 0.45 K/UL — SIGNIFICANT CHANGE UP (ref 0–0.9)
MONOCYTES NFR BLD AUTO: 5.6 % — SIGNIFICANT CHANGE UP (ref 2–14)
NEUTROPHILS # BLD AUTO: 6.44 K/UL — SIGNIFICANT CHANGE UP (ref 1.8–7.4)
NEUTROPHILS NFR BLD AUTO: 80 % — HIGH (ref 43–77)
NRBC # BLD: 0 /100 WBCS — SIGNIFICANT CHANGE UP (ref 0–0)
PLATELET # BLD AUTO: 225 K/UL — SIGNIFICANT CHANGE UP (ref 150–400)
POTASSIUM SERPL-MCNC: 4.1 MMOL/L — SIGNIFICANT CHANGE UP (ref 3.5–5.3)
POTASSIUM SERPL-MCNC: 4.3 MMOL/L — SIGNIFICANT CHANGE UP (ref 3.5–5.3)
POTASSIUM SERPL-SCNC: 4.1 MMOL/L — SIGNIFICANT CHANGE UP (ref 3.5–5.3)
POTASSIUM SERPL-SCNC: 4.3 MMOL/L — SIGNIFICANT CHANGE UP (ref 3.5–5.3)
RBC # BLD: 2.74 M/UL — LOW (ref 3.8–5.2)
RBC # FLD: 13.2 % — SIGNIFICANT CHANGE UP (ref 10.3–14.5)
SODIUM SERPL-SCNC: 141 MMOL/L — SIGNIFICANT CHANGE UP (ref 135–145)
SODIUM SERPL-SCNC: 143 MMOL/L — SIGNIFICANT CHANGE UP (ref 135–145)
TROPONIN I SERPL-MCNC: <0.015 NG/ML — SIGNIFICANT CHANGE UP (ref 0.01–0.04)
WBC # BLD: 8.05 K/UL — SIGNIFICANT CHANGE UP (ref 3.8–10.5)
WBC # FLD AUTO: 8.05 K/UL — SIGNIFICANT CHANGE UP (ref 3.8–10.5)

## 2018-05-30 RX ORDER — ERYTHROPOIETIN 10000 [IU]/ML
10000 INJECTION, SOLUTION INTRAVENOUS; SUBCUTANEOUS ONCE
Qty: 0 | Refills: 0 | Status: COMPLETED | OUTPATIENT
Start: 2018-05-30 | End: 2018-05-30

## 2018-05-30 RX ORDER — HEPARIN SODIUM 5000 [USP'U]/ML
5000 INJECTION INTRAVENOUS; SUBCUTANEOUS EVERY 12 HOURS
Qty: 0 | Refills: 0 | Status: DISCONTINUED | OUTPATIENT
Start: 2018-05-30 | End: 2018-06-03

## 2018-05-30 RX ADMIN — Medication 325 MILLIGRAM(S): at 05:55

## 2018-05-30 RX ADMIN — Medication 100 MILLIGRAM(S): at 05:55

## 2018-05-30 RX ADMIN — ERYTHROPOIETIN 10000 UNIT(S): 10000 INJECTION, SOLUTION INTRAVENOUS; SUBCUTANEOUS at 11:44

## 2018-05-30 RX ADMIN — HEPARIN SODIUM 5000 UNIT(S): 5000 INJECTION INTRAVENOUS; SUBCUTANEOUS at 17:52

## 2018-05-30 RX ADMIN — AMLODIPINE BESYLATE 2.5 MILLIGRAM(S): 2.5 TABLET ORAL at 05:55

## 2018-05-30 RX ADMIN — CLOPIDOGREL BISULFATE 75 MILLIGRAM(S): 75 TABLET, FILM COATED ORAL at 12:21

## 2018-05-30 NOTE — PROGRESS NOTE ADULT - SUBJECTIVE AND OBJECTIVE BOX
89 yo WF ho Htn, Ckd stg V, Htn, CAD s/p PCI many yrs ago,  presented to the ED for a Shiley catheter placement in order to initiate HD. Patient's renal failure has worsened over the past few months with lower extremity edema and leg pain. She had an AVF placed in the right anticubital area one month ago.        05/30/18: Patient seen and examined. HD in progress. She denies any new complaints.     Vital Signs Last 24 Hrs  T(C): 36.7 (30 May 2018 12:09), Max: 36.7 (30 May 2018 09:58)  T(F): 98 (30 May 2018 12:09), Max: 98.1 (30 May 2018 09:58)  HR: 69 (30 May 2018 12:09) (59 - 73)  BP: 163/63 (30 May 2018 12:09) (131/68 - 169/68)  BP(mean): 81 (30 May 2018 08:00) (81 - 101)  RR: 20 (30 May 2018 12:09) (13 - 22)  SpO2: 98% (30 May 2018 12:09) (95% - 100%)        PHYSICAL EXAM:    GENERAL: NAD, Well nourished  HEENT:  NC/AT, EOMI, PERRLA, No scleral icterus, Moist mucous membranes  NECK: Supple, No JVD  CNS:  Alert & Oriented X3, Motor Strength 5/5 B/L upper and lower extremities; DTRs 2+ intact   LUNG: Normal Breath sounds, Clear to auscultation bilaterally, No rales, No rhonchi, No wheezing  HEART: RRR; No murmurs, No rubs  ABDOMEN: +BS, ST/ND/NT  GENITOURINARY: Voiding, Bladder not distended  EXTREMITIES: Rt AVF with good thrill, +LE edema bilaterally  MUSCULOSKELTAL: Joints normal ROM, No TTP, No effusion  VAGINAL: deferred  SKIN: no rashes  RECTAL: deferred, not indicated  BREAST: deferred            Labs:                               7.9    8.05  )-----------( 225      ( 30 May 2018 05:29 )             24.7     30 May 2018 05:29    143    |  111    |  73     ----------------------------<  82     4.3     |  18     |  5.19     Ca    8.1        30 May 2018 05:29    TPro  7.0    /  Alb  3.5    /  TBili  0.4    /  DBili  x      /  AST  23     /  ALT  14     /  AlkPhos  61     29 May 2018 07:36    LIVER FUNCTIONS - ( 29 May 2018 07:36 )  Alb: 3.5 g/dL / Pro: 7.0 gm/dL / ALK PHOS: 61 U/L / ALT: 14 U/L / AST: 23 U/L / GGT: x           PT/INR - ( 29 May 2018 07:36 )   PT: 10.6 sec;   INR: 0.98 ratio         PTT - ( 29 May 2018 07:36 )  PTT:24.8 sec  CAPILLARY BLOOD GLUCOSE      POCT Blood Glucose.: 135 mg/dL (29 May 2018 23:17)    CARDIAC MARKERS ( 29 May 2018 23:47 )  <0.015 ng/mL / x     / x     / x     / x      CARDIAC MARKERS ( 29 May 2018 07:36 )  <0.015 ng/mL / x     / x     / x     / x                MEDICATIONS:    amLODIPine   Tablet 2.5 milliGRAM(s) Oral daily  clopidogrel Tablet 75 milliGRAM(s) Oral daily  metoprolol succinate  milliGRAM(s) Oral daily    MEDICATIONS  (PRN):  aluminum hydroxide/magnesium hydroxide/simethicone Suspension 30 milliLiter(s) Oral every 4 hours PRN Dyspepsia  enalaprilat Injectable 1.25 milliGRAM(s) IV Push every 6 hours PRN SBP>170  ondansetron Injectable 4 milliGRAM(s) IV Push every 6 hours PRN Nausea        Assessment and Plan:    1. ESRD: new onset  S/P Shiley placement   Renal consult appreciated  HD in progress.      2. Htn: stable  resume Toprol, Norvasc  Vasotec IV    3. CAD s/p PCI:  cw Plavix    4. DVT prophylaxis. 89 yo WF ho Htn, Ckd stg V, Htn, CAD s/p PCI many yrs ago,  presented to the ED for a Shiley catheter placement in order to initiate HD. Patient's renal failure has worsened over the past few months with lower extremity edema and leg pain. She had an AVF placed in the right anticubital area one month ago.        05/30/18: Patient seen and examined. HD in progress. She denies any new complaints.     Vital Signs Last 24 Hrs  T(C): 36.7 (30 May 2018 12:09), Max: 36.7 (30 May 2018 09:58)  T(F): 98 (30 May 2018 12:09), Max: 98.1 (30 May 2018 09:58)  HR: 69 (30 May 2018 12:09) (59 - 73)  BP: 163/63 (30 May 2018 12:09) (131/68 - 169/68)  BP(mean): 81 (30 May 2018 08:00) (81 - 101)  RR: 20 (30 May 2018 12:09) (13 - 22)  SpO2: 98% (30 May 2018 12:09) (95% - 100%)        PHYSICAL EXAM:    GENERAL: NAD, Well nourished  HEENT:  NC/AT, EOMI, PERRLA, No scleral icterus, Moist mucous membranes  NECK: Supple, No JVD  CNS:  Alert & Oriented X3, Motor Strength 5/5 B/L upper and lower extremities; DTRs 2+ intact   LUNG: Normal Breath sounds, Clear to auscultation bilaterally, No rales, No rhonchi, No wheezing  HEART: RRR; No murmurs, No rubs  ABDOMEN: +BS, ST/ND/NT  GENITOURINARY: Voiding, Bladder not distended  EXTREMITIES: Rt AVF with good thrill, +LE edema bilaterally  MUSCULOSKELTAL: Joints normal ROM, No TTP, No effusion  VAGINAL: deferred  SKIN: no rashes  RECTAL: deferred, not indicated  BREAST: deferred            Labs:                               7.9    8.05  )-----------( 225      ( 30 May 2018 05:29 )             24.7     30 May 2018 05:29    143    |  111    |  73     ----------------------------<  82     4.3     |  18     |  5.19     Ca    8.1        30 May 2018 05:29    TPro  7.0    /  Alb  3.5    /  TBili  0.4    /  DBili  x      /  AST  23     /  ALT  14     /  AlkPhos  61     29 May 2018 07:36    LIVER FUNCTIONS - ( 29 May 2018 07:36 )  Alb: 3.5 g/dL / Pro: 7.0 gm/dL / ALK PHOS: 61 U/L / ALT: 14 U/L / AST: 23 U/L / GGT: x           PT/INR - ( 29 May 2018 07:36 )   PT: 10.6 sec;   INR: 0.98 ratio         PTT - ( 29 May 2018 07:36 )  PTT:24.8 sec  CAPILLARY BLOOD GLUCOSE      POCT Blood Glucose.: 135 mg/dL (29 May 2018 23:17)    CARDIAC MARKERS ( 29 May 2018 23:47 )  <0.015 ng/mL / x     / x     / x     / x      CARDIAC MARKERS ( 29 May 2018 07:36 )  <0.015 ng/mL / x     / x     / x     / x                MEDICATIONS:    amLODIPine   Tablet 2.5 milliGRAM(s) Oral daily  clopidogrel Tablet 75 milliGRAM(s) Oral daily  metoprolol succinate  milliGRAM(s) Oral daily    MEDICATIONS  (PRN):  aluminum hydroxide/magnesium hydroxide/simethicone Suspension 30 milliLiter(s) Oral every 4 hours PRN Dyspepsia  enalaprilat Injectable 1.25 milliGRAM(s) IV Push every 6 hours PRN SBP>170  ondansetron Injectable 4 milliGRAM(s) IV Push every 6 hours PRN Nausea        Assessment and Plan:    1. ESRD: new onset  S/P Shiley placement   Renal consult appreciated  HD in progress.      2. Htn: stable  resume Toprol, Norvasc  Vasotec IV    3. CAD s/p PCI:  cw Plavix    4. DVT prophylaxis.     5. Chr anemia due to CKD  S/P one unit PRBC on HD  Follow

## 2018-05-30 NOTE — PROGRESS NOTE ADULT - ASSESSMENT
89 yo woman with CKD now progressed to end stage with moderate uremic symptoms.  AVF still immature for cannulation.  --ESRD : permcath placement and initiation of HD in am  --Anemia of CKD.  To be started on LEXX once HD starts.  --Fluid overload due to ESRD : UF with HD.  --HTN : monitor for adjustments of meds with initiation of dialysis.    5/30 SY  --ESRD : 1st HD tx initiated via Right IJ permcath uneventfully.  2 hour treatment.  --Anemia of CKD with acute blood loss.  Transfuse one unit due to weakness and start LEXX.  --BP improved : adjust meds.

## 2018-05-30 NOTE — PROGRESS NOTE ADULT - SUBJECTIVE AND OBJECTIVE BOX
events of evening read and case discussed with DARY Culp    bleeding from tunnel site requiring suture in early AM.  Pt had syncopal episode but appears stable now and neurologically intact.      No bleeding now    HCT dropped to 24 from 28    A/P  status post R IJ Hemastar with HCT drop to 24 and syncopal episode    stable now    can use Hemastar anytime    MEDICATIONS  (STANDING):  amLODIPine   Tablet 2.5 milliGRAM(s) Oral daily  cloNIDine 0.1 milliGRAM(s) Oral at bedtime  clopidogrel Tablet 75 milliGRAM(s) Oral daily  metoprolol succinate  milliGRAM(s) Oral daily  sodium bicarbonate 325 milliGRAM(s) Oral two times a day    MEDICATIONS  (PRN):  aluminum hydroxide/magnesium hydroxide/simethicone Suspension 30 milliLiter(s) Oral every 4 hours PRN Dyspepsia  enalaprilat Injectable 1.25 milliGRAM(s) IV Push every 6 hours PRN SBP>170  ondansetron Injectable 4 milliGRAM(s) IV Push every 6 hours PRN Nausea      Allergies    sulfa drugs (Unknown)    Intolerances        Flatus: [ ] YES [ ] NO             Bowel Movement: [ ] YES [ ] NO  Pain (0-10):            Pain Control Adequate: [ ] YES [ ] NO  Nausea: [ ] YES [ ] NO            Vomiting: [ ] YES [ ] NO  Diarrhea: [ ] YES [ ] NO         Constipation: [ ] YES [ ] NO     Chest Pain: [ ] YES [ ] NO    SOB:  [ ] YES [ ] NO    Vital Signs Last 24 Hrs  T(C): 36.3 (30 May 2018 06:30), Max: 37.2 (29 May 2018 07:05)  T(F): 97.3 (30 May 2018 06:30), Max: 98.9 (29 May 2018 07:05)  HR: 69 (30 May 2018 06:00) (59 - 73)  BP: 149/66 (30 May 2018 04:22) (148/92 - 187/73)  BP(mean): 101 (30 May 2018 00:00) (101 - 101)  RR: 14 (30 May 2018 06:00) (14 - 22)  SpO2: 100% (30 May 2018 02:00) (95% - 100%)    I&O's Summary    29 May 2018 07:01  -  30 May 2018 07:00  --------------------------------------------------------  IN: 495 mL / OUT: 300 mL / NET: 195 mL        Physical Exam:  General: NAD, resting comfortably  Pulmonary: normal resp effort, CTA-B  Cardiovascular: NSR  Abdominal: soft, NT/ND  Extremities: WWP, normal strength  Neuro: A/O x 3, CNs II-XII grossly intact, normal motor/sensation, no focal deficits  Pulses:   Right:                                                                          Left:  FEM [ ]2+ [ ]1+ [ ]doppler                                             FEM [ ]2+ [ ]1+ [ ]doppler    POP [ ]2+ [ ]1+ [ ]doppler                                             POP [ ]2+ [ ]1+ [ ]doppler    DP [ ]2+ [ ]1+ [ ]doppler                                                DP [ ]2+ [ ]1+ [ ]doppler  PT[ ]2+ [ ]1+ [ ]doppler                                                  PT [ ]2+ [ ]1+ [ ]doppler    LABS:                        7.9    8.05  )-----------( 225      ( 30 May 2018 05:29 )             24.7     05-30    143  |  111<H>  |  73<H>  ----------------------------<  82  4.3   |  18<L>  |  5.19<H>    Ca    8.1<L>      30 May 2018 05:29    TPro  7.0  /  Alb  3.5  /  TBili  0.4  /  DBili  x   /  AST  23  /  ALT  14  /  AlkPhos  61  05-29    PT/INR - ( 29 May 2018 07:36 )   PT: 10.6 sec;   INR: 0.98 ratio         PTT - ( 29 May 2018 07:36 )  PTT:24.8 sec    LIVER FUNCTIONS - ( 29 May 2018 07:36 )  Alb: 3.5 g/dL / Pro: 7.0 gm/dL / ALK PHOS: 61 U/L / ALT: 14 U/L / AST: 23 U/L / GGT: x           CAPILLARY BLOOD GLUCOSE      POCT Blood Glucose.: 135 mg/dL (29 May 2018 23:17)      RADIOLOGY & ADDITIONAL TESTS: events of evening read and case discussed with DARY Culp    bleeding from tunnel site requiring suture in early AM.  Pt had syncopal episode but appears stable now and neurologically intact.      No bleeding now    HCT dropped to 24 from 28    A/P  status post R IJ Hemastar with HCT drop to 24 and syncopal episode    stable now    can use Hemastar anytime    transfuse if deemed necessary by renal    MEDICATIONS  (STANDING):  amLODIPine   Tablet 2.5 milliGRAM(s) Oral daily  cloNIDine 0.1 milliGRAM(s) Oral at bedtime  clopidogrel Tablet 75 milliGRAM(s) Oral daily  metoprolol succinate  milliGRAM(s) Oral daily  sodium bicarbonate 325 milliGRAM(s) Oral two times a day    MEDICATIONS  (PRN):  aluminum hydroxide/magnesium hydroxide/simethicone Suspension 30 milliLiter(s) Oral every 4 hours PRN Dyspepsia  enalaprilat Injectable 1.25 milliGRAM(s) IV Push every 6 hours PRN SBP>170  ondansetron Injectable 4 milliGRAM(s) IV Push every 6 hours PRN Nausea      Allergies    sulfa drugs (Unknown)    Intolerances        Flatus: [ ] YES [ ] NO             Bowel Movement: [ ] YES [ ] NO  Pain (0-10):            Pain Control Adequate: [ ] YES [ ] NO  Nausea: [ ] YES [ ] NO            Vomiting: [ ] YES [ ] NO  Diarrhea: [ ] YES [ ] NO         Constipation: [ ] YES [ ] NO     Chest Pain: [ ] YES [ ] NO    SOB:  [ ] YES [ ] NO    Vital Signs Last 24 Hrs  T(C): 36.3 (30 May 2018 06:30), Max: 37.2 (29 May 2018 07:05)  T(F): 97.3 (30 May 2018 06:30), Max: 98.9 (29 May 2018 07:05)  HR: 69 (30 May 2018 06:00) (59 - 73)  BP: 149/66 (30 May 2018 04:22) (148/92 - 187/73)  BP(mean): 101 (30 May 2018 00:00) (101 - 101)  RR: 14 (30 May 2018 06:00) (14 - 22)  SpO2: 100% (30 May 2018 02:00) (95% - 100%)    I&O's Summary    29 May 2018 07:01  -  30 May 2018 07:00  --------------------------------------------------------  IN: 495 mL / OUT: 300 mL / NET: 195 mL        Physical Exam:  General: NAD, resting comfortably  Pulmonary: normal resp effort, CTA-B  Cardiovascular: NSR  Abdominal: soft, NT/ND  Extremities: WWP, normal strength  Neuro: A/O x 3, CNs II-XII grossly intact, normal motor/sensation, no focal deficits  Pulses:   Right:                                                                          Left:  FEM [ ]2+ [ ]1+ [ ]doppler                                             FEM [ ]2+ [ ]1+ [ ]doppler    POP [ ]2+ [ ]1+ [ ]doppler                                             POP [ ]2+ [ ]1+ [ ]doppler    DP [ ]2+ [ ]1+ [ ]doppler                                                DP [ ]2+ [ ]1+ [ ]doppler  PT[ ]2+ [ ]1+ [ ]doppler                                                  PT [ ]2+ [ ]1+ [ ]doppler    LABS:                        7.9    8.05  )-----------( 225      ( 30 May 2018 05:29 )             24.7     05-30    143  |  111<H>  |  73<H>  ----------------------------<  82  4.3   |  18<L>  |  5.19<H>    Ca    8.1<L>      30 May 2018 05:29    TPro  7.0  /  Alb  3.5  /  TBili  0.4  /  DBili  x   /  AST  23  /  ALT  14  /  AlkPhos  61  05-29    PT/INR - ( 29 May 2018 07:36 )   PT: 10.6 sec;   INR: 0.98 ratio         PTT - ( 29 May 2018 07:36 )  PTT:24.8 sec    LIVER FUNCTIONS - ( 29 May 2018 07:36 )  Alb: 3.5 g/dL / Pro: 7.0 gm/dL / ALK PHOS: 61 U/L / ALT: 14 U/L / AST: 23 U/L / GGT: x           CAPILLARY BLOOD GLUCOSE      POCT Blood Glucose.: 135 mg/dL (29 May 2018 23:17)      RADIOLOGY & ADDITIONAL TESTS:

## 2018-05-30 NOTE — PROVIDER CONTACT NOTE (OTHER) - SITUATION
PA made aware that dressing is saturated to shiley site. (PA was here earlier and had just changed dressing)

## 2018-05-30 NOTE — PROGRESS NOTE ADULT - SUBJECTIVE AND OBJECTIVE BOX
called to re-evaluate pt with persistent bleeding from hemostar site.    RAPID RESPONSE and CODE BLUE called earlier when the pt was found unresponsive in bathroom and no pulse palpated.  when pt returned to bed, pulse was found and pt became responsive.    PE:  T(C): 36.4 (05-29-18 @ 21:19), Max: 37.2 (05-29-18 @ 07:05)  HR: 66 (05-30-18 @ 00:30) (60 - 73)  BP: 156/85 (05-30-18 @ 00:00) (149/86 - 187/73)  RR: 22 (05-30-18 @ 00:00) (14 - 22)  SpO2: 100% (05-30-18 @ 00:00) (95% - 100%)  Wt(kg): --    R chest:  hemostar in place with persistent bleeding from the insertion site.    Ass:  post-op bleeding s/p hemostar placement R IJ  Plan:  Right anterior chest prepped and draped in steril fashion.  1% lidocaine used as local anesthestic. Placed 2 sutures 3-0 prolene at insertion site without cessation of bleeding.  Pressure dressing applied.

## 2018-05-30 NOTE — PROGRESS NOTE ADULT - SUBJECTIVE AND OBJECTIVE BOX
NEPHROLOGY INTERVAL HPI/OVERNIGHT EVENTS:   SY  S/p Right IJ permcath placement.  Reports she felt very weak.  Walked to bathroom, felt weak--syncope reported.  Also noted with acute drop in HGB due to surgical site bleeding.    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   CKD noted progressive for last few years.  Pt initially refused to consider HD.  However, she recently changed her mind due to her wishes to her wishes to continue to care for her  with Parkinson's disease at home.  Post AVF placement with slow maturation.  Seen in office on  and pt reported very minimal po intake due to very poor appetite since AVF placement one month ago.  Noted with new onset LE edema and increased creat to 5.  Therefore, pt now is to have Permcath placed for initiation of HD.    	  PMHX and PSHX.  1.HTN  2.CAD ( stent x1 in )  3.Hx of Syncope    MEDICATIONS  (STANDING):  amLODIPine   Tablet 2.5 milliGRAM(s) Oral daily  cloNIDine 0.1 milliGRAM(s) Oral at bedtime  clopidogrel Tablet 75 milliGRAM(s) Oral daily  epoetin [place] Injectable 59255 Unit(s) IV Push once  metoprolol succinate  milliGRAM(s) Oral daily  sodium bicarbonate 325 milliGRAM(s) Oral two times a day    MEDICATIONS  (PRN):  aluminum hydroxide/magnesium hydroxide/simethicone Suspension 30 milliLiter(s) Oral every 4 hours PRN Dyspepsia  enalaprilat Injectable 1.25 milliGRAM(s) IV Push every 6 hours PRN SBP>170  ondansetron Injectable 4 milliGRAM(s) IV Push every 6 hours PRN Nausea          Vital Signs Last 24 Hrs  T(C): 36.3 (30 May 2018 06:30), Max: 36.9 (29 May 2018 10:24)  T(F): 97.3 (30 May 2018 06:30), Max: 98.5 (29 May 2018 10:24)  HR: 65 (30 May 2018 08:00) (59 - 73)  BP: 131/68 (30 May 2018 08:00) (131/68 - 187/73)  BP(mean): 81 (30 May 2018 08:00) (81 - 101)  RR: 18 (30 May 2018 08:00) (14 - 22)  SpO2: 100% (30 May 2018 02:00) (95% - 100%)  Daily     Daily Weight in k.6 (30 May 2018 06:30)     @ 07:01  -   @ 07:00  --------------------------------------------------------  IN: 495 mL / OUT: 300 mL / NET: 195 mL        PHYSICAL EXAM:  Alert and appropriate  GENERAL: No distress  CHEST/LUNG: Fair air entry  HEART: S1S2 RRR  ABDOMEN: soft  EXTREMITIES: decreased edema  SKIN:     LABS:                        7.9    8.05  )-----------( 225      ( 30 May 2018 05:29 )             24.7     05-30    143  |  111<H>  |  73<H>  ----------------------------<  82  4.3   |  18<L>  |  5.19<H>    Ca    8.1<L>      30 May 2018 05:29    TPro  7.0  /  Alb  3.5  /  TBili  0.4  /  DBili  x   /  AST  23  /  ALT  14  /  AlkPhos  61  0529    PT/INR - ( 29 May 2018 07:36 )   PT: 10.6 sec;   INR: 0.98 ratio         PTT - ( 29 May 2018 07:36 )  PTT:24.8 sec            RADIOLOGY & ADDITIONAL TESTS:

## 2018-05-31 LAB
ALBUMIN SERPL ELPH-MCNC: 3 G/DL — LOW (ref 3.3–5)
ANION GAP SERPL CALC-SCNC: 9 MMOL/L — SIGNIFICANT CHANGE UP (ref 5–17)
BUN SERPL-MCNC: 39 MG/DL — HIGH (ref 7–23)
CALCIUM SERPL-MCNC: 8.2 MG/DL — LOW (ref 8.5–10.1)
CHLORIDE SERPL-SCNC: 107 MMOL/L — SIGNIFICANT CHANGE UP (ref 96–108)
CO2 SERPL-SCNC: 24 MMOL/L — SIGNIFICANT CHANGE UP (ref 22–31)
CREAT SERPL-MCNC: 3.88 MG/DL — HIGH (ref 0.5–1.3)
GLUCOSE SERPL-MCNC: 138 MG/DL — HIGH (ref 70–99)
HCT VFR BLD CALC: 28.4 % — LOW (ref 34.5–45)
HCT VFR BLD CALC: 32.1 % — LOW (ref 34.5–45)
HGB BLD-MCNC: 10.9 G/DL — LOW (ref 11.5–15.5)
HGB BLD-MCNC: 9.5 G/DL — LOW (ref 11.5–15.5)
MCHC RBC-ENTMCNC: 29.1 PG — SIGNIFICANT CHANGE UP (ref 27–34)
MCHC RBC-ENTMCNC: 29.5 PG — SIGNIFICANT CHANGE UP (ref 27–34)
MCHC RBC-ENTMCNC: 33.5 GM/DL — SIGNIFICANT CHANGE UP (ref 32–36)
MCHC RBC-ENTMCNC: 34 GM/DL — SIGNIFICANT CHANGE UP (ref 32–36)
MCV RBC AUTO: 85.8 FL — SIGNIFICANT CHANGE UP (ref 80–100)
MCV RBC AUTO: 88.2 FL — SIGNIFICANT CHANGE UP (ref 80–100)
NRBC # BLD: 0 /100 WBCS — SIGNIFICANT CHANGE UP (ref 0–0)
NRBC # BLD: 0 /100 WBCS — SIGNIFICANT CHANGE UP (ref 0–0)
PHOSPHATE SERPL-MCNC: 3.6 MG/DL — SIGNIFICANT CHANGE UP (ref 2.5–4.5)
PLATELET # BLD AUTO: 196 K/UL — SIGNIFICANT CHANGE UP (ref 150–400)
PLATELET # BLD AUTO: 226 K/UL — SIGNIFICANT CHANGE UP (ref 150–400)
POTASSIUM SERPL-MCNC: 3.5 MMOL/L — SIGNIFICANT CHANGE UP (ref 3.5–5.3)
POTASSIUM SERPL-SCNC: 3.5 MMOL/L — SIGNIFICANT CHANGE UP (ref 3.5–5.3)
RBC # BLD: 3.22 M/UL — LOW (ref 3.8–5.2)
RBC # BLD: 3.74 M/UL — LOW (ref 3.8–5.2)
RBC # FLD: 13.5 % — SIGNIFICANT CHANGE UP (ref 10.3–14.5)
RBC # FLD: 13.5 % — SIGNIFICANT CHANGE UP (ref 10.3–14.5)
SODIUM SERPL-SCNC: 140 MMOL/L — SIGNIFICANT CHANGE UP (ref 135–145)
WBC # BLD: 11.61 K/UL — HIGH (ref 3.8–10.5)
WBC # BLD: 8.05 K/UL — SIGNIFICANT CHANGE UP (ref 3.8–10.5)
WBC # FLD AUTO: 11.61 K/UL — HIGH (ref 3.8–10.5)
WBC # FLD AUTO: 8.05 K/UL — SIGNIFICANT CHANGE UP (ref 3.8–10.5)

## 2018-05-31 RX ORDER — VALSARTAN 80 MG/1
40 TABLET ORAL DAILY
Qty: 0 | Refills: 0 | Status: DISCONTINUED | OUTPATIENT
Start: 2018-05-31 | End: 2018-06-03

## 2018-05-31 RX ADMIN — AMLODIPINE BESYLATE 2.5 MILLIGRAM(S): 2.5 TABLET ORAL at 05:56

## 2018-05-31 RX ADMIN — HEPARIN SODIUM 5000 UNIT(S): 5000 INJECTION INTRAVENOUS; SUBCUTANEOUS at 19:38

## 2018-05-31 RX ADMIN — HEPARIN SODIUM 5000 UNIT(S): 5000 INJECTION INTRAVENOUS; SUBCUTANEOUS at 05:57

## 2018-05-31 RX ADMIN — CLOPIDOGREL BISULFATE 75 MILLIGRAM(S): 75 TABLET, FILM COATED ORAL at 15:25

## 2018-05-31 RX ADMIN — Medication 100 MILLIGRAM(S): at 05:56

## 2018-05-31 RX ADMIN — VALSARTAN 40 MILLIGRAM(S): 80 TABLET ORAL at 21:24

## 2018-05-31 NOTE — PROGRESS NOTE ADULT - SUBJECTIVE AND OBJECTIVE BOX
89 yo WF ho Htn, Ckd stg V, Htn, CAD s/p PCI many yrs ago,  presented to the ED for a Shiley catheter placement in order to initiate HD. Patient's renal failure has worsened over the past few months with lower extremity edema and leg pain. She had an AVF placed in the right anticubital area one month ago.        05/30/18: Patient seen and examined. HD in progress. She denies any new complaints.  05/31/18: Patient seen and examined. 2nd HD in progress, tolerating well. Oozing  blood from catheter site. Dr Henson to see her.     Vital Signs Last 24 Hrs  T(C): 36.8 (31 May 2018 10:51), Max: 36.9 (31 May 2018 04:06)  T(F): 98.2 (31 May 2018 10:51), Max: 98.5 (31 May 2018 04:06)  HR: 70 (31 May 2018 11:51) (66 - 84)  BP: 170/69 (31 May 2018 11:51) (148/82 - 173/69)  BP(mean): 127 (31 May 2018 10:00) (87 - 127)  RR: 19 (31 May 2018 11:51) (16 - 23)  SpO2: 100% (31 May 2018 11:51) (99% - 100%)        PHYSICAL EXAM:    GENERAL: NAD, Well nourished  HEENT:  NC/AT, EOMI, PERRLA, No scleral icterus, Moist mucous membranes  NECK: Supple, No JVD  CNS:  Alert & Oriented X3, Motor Strength 5/5 B/L upper and lower extremities; DTRs 2+ intact   LUNG: Normal Breath sounds, Clear to auscultation bilaterally, No rales, No rhonchi, No wheezing  HEART: RRR; No murmurs, No rubs  ABDOMEN: +BS, ST/ND/NT  GENITOURINARY: Voiding, Bladder not distended  EXTREMITIES: Rt AVF with good thrill, +LE edema bilaterally  MUSCULOSKELTAL: Joints normal ROM, No TTP, No effusion  VAGINAL: deferred  SKIN: no rashes  RECTAL: deferred, not indicated  BREAST: deferred            Labs:                                            9.5    8.05  )-----------( 196      ( 31 May 2018 05:49 )             28.4     31 May 2018 10:45    140    |  107    |  39     ----------------------------<  138    3.5     |  24     |  3.88     Ca    8.2        31 May 2018 10:45  Phos  3.6       31 May 2018 10:45    TPro  x      /  Alb  3.0    /  TBili  x      /  DBili  x      /  AST  x      /  ALT  x      /  AlkPhos  x      31 May 2018 10:45    LIVER FUNCTIONS - ( 31 May 2018 10:45 )  Alb: 3.0 g/dL / Pro: x     / ALK PHOS: x     / ALT: x     / AST: x     / GGT: x             CAPILLARY BLOOD GLUCOSE        CARDIAC MARKERS ( 29 May 2018 23:47 )  <0.015 ng/mL / x     / x     / x     / x                    MEDICATIONS:    MEDICATIONS  (STANDING):  amLODIPine   Tablet 2.5 milliGRAM(s) Oral daily  clopidogrel Tablet 75 milliGRAM(s) Oral daily  heparin  Injectable 5000 Unit(s) SubCutaneous every 12 hours  metoprolol succinate  milliGRAM(s) Oral daily  valsartan 40 milliGRAM(s) Oral daily    MEDICATIONS  (PRN):  aluminum hydroxide/magnesium hydroxide/simethicone Suspension 30 milliLiter(s) Oral every 4 hours PRN Dyspepsia  enalaprilat Injectable 1.25 milliGRAM(s) IV Push every 6 hours PRN SBP>170  ondansetron Injectable 4 milliGRAM(s) IV Push every 6 hours PRN Nausea          Assessment and Plan:    1. ESRD: new onset  S/P Shiley placement   Renal follow up appreciated  2nd HD in progress. Tolerating well      2. Htn: stable  Continue Toprol, Norvasc  Diovan added today      3. CAD s/p PCI:  cw Plavix    4. DVT prophylaxis.     5. Chr anemia due to CKD  S/P one unit PRBC on HD yesterday  HGB 9.2 today  Follow     6. Disposition-PT eval

## 2018-05-31 NOTE — PROGRESS NOTE ADULT - SUBJECTIVE AND OBJECTIVE BOX
stable but still intermittently bleeding from catheter exit site, required 1 u PRBC during dialysis yesterday    minimal ooze from catheter exit site; no significant hematoma around catheter in tunnel, resolving ecchymosis    A/P  ooze from subcutaneous tunnel, pressure dressing applied to distal 1/3 and surgicell wrapped around catheter exit site    will observe for 24 hours    MEDICATIONS  (STANDING):  amLODIPine   Tablet 2.5 milliGRAM(s) Oral daily  clopidogrel Tablet 75 milliGRAM(s) Oral daily  heparin  Injectable 5000 Unit(s) SubCutaneous every 12 hours  metoprolol succinate  milliGRAM(s) Oral daily  valsartan 40 milliGRAM(s) Oral daily    MEDICATIONS  (PRN):  aluminum hydroxide/magnesium hydroxide/simethicone Suspension 30 milliLiter(s) Oral every 4 hours PRN Dyspepsia  enalaprilat Injectable 1.25 milliGRAM(s) IV Push every 6 hours PRN SBP>170  ondansetron Injectable 4 milliGRAM(s) IV Push every 6 hours PRN Nausea      Allergies    sulfa drugs (Unknown)    Intolerances        Flatus: [ ] YES [ ] NO             Bowel Movement: [ ] YES [ ] NO  Pain (0-10):            Pain Control Adequate: [ ] YES [ ] NO  Nausea: [ ] YES [ ] NO            Vomiting: [ ] YES [ ] NO  Diarrhea: [ ] YES [ ] NO         Constipation: [ ] YES [ ] NO     Chest Pain: [ ] YES [ ] NO    SOB:  [ ] YES [ ] NO    Vital Signs Last 24 Hrs  T(C): 36.8 (31 May 2018 10:51), Max: 36.9 (31 May 2018 04:06)  T(F): 98.2 (31 May 2018 10:51), Max: 98.5 (31 May 2018 04:06)  HR: 76 (31 May 2018 12:38) (66 - 84)  BP: 173/83 (31 May 2018 12:38) (148/82 - 174/73)  BP(mean): 127 (31 May 2018 10:00) (87 - 127)  RR: 16 (31 May 2018 12:38) (16 - 23)  SpO2: 98% (31 May 2018 12:38) (98% - 100%)    I&O's Summary    30 May 2018 07:01  -  31 May 2018 07:00  --------------------------------------------------------  IN: 367 mL / OUT: 250 mL / NET: 117 mL        Physical Exam:  General: NAD, resting comfortably  Pulmonary: normal resp effort, CTA-B  Cardiovascular: NSR  Abdominal: soft, NT/ND  Extremities: WWP, normal strength  Neuro: A/O x 3, CNs II-XII grossly intact, normal motor/sensation, no focal deficits  Pulses:   Right:                                                                          Left:  FEM [ ]2+ [ ]1+ [ ]doppler                                             FEM [ ]2+ [ ]1+ [ ]doppler    POP [ ]2+ [ ]1+ [ ]doppler                                             POP [ ]2+ [ ]1+ [ ]doppler    DP [ ]2+ [ ]1+ [ ]doppler                                                DP [ ]2+ [ ]1+ [ ]doppler  PT[ ]2+ [ ]1+ [ ]doppler                                                  PT [ ]2+ [ ]1+ [ ]doppler    LABS:                        9.5    8.05  )-----------( 196      ( 31 May 2018 05:49 )             28.4     05-31    140  |  107  |  39<H>  ----------------------------<  138<H>  3.5   |  24  |  3.88<H>    Ca    8.2<L>      31 May 2018 10:45  Phos  3.6     05-31    TPro  x   /  Alb  3.0<L>  /  TBili  x   /  DBili  x   /  AST  x   /  ALT  x   /  AlkPhos  x   05-31        LIVER FUNCTIONS - ( 31 May 2018 10:45 )  Alb: 3.0 g/dL / Pro: x     / ALK PHOS: x     / ALT: x     / AST: x     / GGT: x           CAPILLARY BLOOD GLUCOSE          RADIOLOGY & ADDITIONAL TESTS:

## 2018-05-31 NOTE — PROGRESS NOTE ADULT - SUBJECTIVE AND OBJECTIVE BOX
NEPHROLOGY INTERVAL HPI/OVERNIGHT EVENTS:   SY  Feeling better this am.  No acute events.  Noted with slow oozing at catheter insertion site.     SY  S/p Right IJ permcath placement.  Reports she felt very weak.  Walked to bathroom, felt weak--syncope reported.  Also noted with acute drop in HGB due to surgical site bleeding.    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   CKD noted progressive for last few years.  Pt initially refused to consider HD.  However, she recently changed her mind due to her wishes to her wishes to continue to care for her  with Parkinson's disease at home.  Post AVF placement with slow maturation.  Seen in office on  and pt reported very minimal po intake due to very poor appetite since AVF placement one month ago.  Noted with new onset LE edema and increased creat to 5.  Therefore, pt now is to have Permcath placed for initiation of HD.  	  PMHX and PSHX.  1.HTN  2.CAD ( stent x1 in )  3.Hx of Syncope    MEDICATIONS  (STANDING):  amLODIPine   Tablet 2.5 milliGRAM(s) Oral daily  clopidogrel Tablet 75 milliGRAM(s) Oral daily  heparin  Injectable 5000 Unit(s) SubCutaneous every 12 hours  metoprolol succinate  milliGRAM(s) Oral daily    MEDICATIONS  (PRN):  aluminum hydroxide/magnesium hydroxide/simethicone Suspension 30 milliLiter(s) Oral every 4 hours PRN Dyspepsia  enalaprilat Injectable 1.25 milliGRAM(s) IV Push every 6 hours PRN SBP>170  ondansetron Injectable 4 milliGRAM(s) IV Push every 6 hours PRN Nausea          Vital Signs Last 24 Hrs  T(C): 36.8 (31 May 2018 08:06), Max: 36.9 (31 May 2018 04:06)  T(F): 98.2 (31 May 2018 08:06), Max: 98.5 (31 May 2018 04:06)  HR: 74 (31 May 2018 10:00) (64 - 84)  BP: 160/116 (31 May 2018 10:00) (148/82 - 166/67)  BP(mean): 127 (31 May 2018 10:00) (87 - 127)  RR: 16 (31 May 2018 10:00) (16 - 23)  SpO2: 98% (30 May 2018 12:09) (98% - 99%)  Daily     Daily Weight in k.2 (31 May 2018 04:06)    05-30 @ 07:01  -  0531 @ 07:00  --------------------------------------------------------  IN: 367 mL / OUT: 250 mL / NET: 117 mL        PHYSICAL EXAM:  Alert and comfortable  GENERAL: No acute distress  CHEST/LUNG: Clear to aus  HEART: S1S2 RRR  ABDOMEN: soft  EXTREMITIES: resolved edema  SKIN:     LABS:                        9.5    8.05  )-----------( 196      ( 31 May 2018 05:49 )             28.4     05-30    143  |  111<H>  |  73<H>  ----------------------------<  82  4.3   |  18<L>  |  5.19<H>    Ca    8.1<L>      30 May 2018 05:29                  RADIOLOGY & ADDITIONAL TESTS:

## 2018-05-31 NOTE — CHART NOTE - NSCHARTNOTEFT_GEN_A_CORE
called for bleeding are cath site. PT seen and examined, sutures have been placed prior for bleeding.  Surgicel and pressure applied to track. No further bleeding noted.  Dr. edwards aware

## 2018-05-31 NOTE — PROGRESS NOTE ADULT - ASSESSMENT
89 yo woman with CKD now progressed to end stage with moderate uremic symptoms.  AVF still immature for cannulation.  --ESRD : permcath placement and initiation of HD in am  --Anemia of CKD.  To be started on LEXX once HD starts.  --Fluid overload due to ESRD : UF with HD.  --HTN : monitor for adjustments of meds with initiation of dialysis.    5/30 SY  --ESRD : 1st HD tx initiated via Right IJ permcath uneventfully.  2 hour treatment.  --Anemia of CKD with acute blood loss.  Transfuse one unit due to weakness and start LEXX.  --BP improved : adjust meds.    5/31 SY  --ESRD : second HD initiated uneventfully.   Will maintain on TIW HD now.  [place] [facility] [place]  --Fluid status stable.  --HTN : Clonidine d/ashwini to avoid central acting agent.   Add Diovan to current regimen. 89 yo woman with CKD now progressed to end stage with moderate uremic symptoms.  AVF still immature for cannulation.  --ESRD : permcath placement and initiation of HD in am  --Anemia of CKD.  To be started on LEXX once HD starts.  --Fluid overload due to ESRD : UF with HD.  --HTN : monitor for adjustments of meds with initiation of dialysis.    5/30 SY  --ESRD : 1st HD tx initiated via Right IJ permcath uneventfully.  2 hour treatment.  --Anemia of CKD with acute blood loss.  Transfuse one unit due to weakness and start LEXX.  --BP improved : adjust meds.    5/31 SY  --ESRD : second HD initiated uneventfully.   Will maintain on TIW HD now.  [place] patient [place] arranged [place] [facility] [place]  --Fluid status stable.  --HTN : Clonidine d/ashwini to avoid central acting agent.   Add Diovan to current regimen.  --Surgical PA to follow up for catheter insertion site bleeding.

## 2018-06-01 LAB
ANION GAP SERPL CALC-SCNC: 9 MMOL/L — SIGNIFICANT CHANGE UP (ref 5–17)
BUN SERPL-MCNC: 21 MG/DL — SIGNIFICANT CHANGE UP (ref 7–23)
CALCIUM SERPL-MCNC: 8.5 MG/DL — SIGNIFICANT CHANGE UP (ref 8.5–10.1)
CHLORIDE SERPL-SCNC: 105 MMOL/L — SIGNIFICANT CHANGE UP (ref 96–108)
CO2 SERPL-SCNC: 24 MMOL/L — SIGNIFICANT CHANGE UP (ref 22–31)
CREAT SERPL-MCNC: 2.89 MG/DL — HIGH (ref 0.5–1.3)
GLUCOSE SERPL-MCNC: 73 MG/DL — SIGNIFICANT CHANGE UP (ref 70–99)
HCT VFR BLD CALC: 31.8 % — LOW (ref 34.5–45)
HGB BLD-MCNC: 10.5 G/DL — LOW (ref 11.5–15.5)
MCHC RBC-ENTMCNC: 28.8 PG — SIGNIFICANT CHANGE UP (ref 27–34)
MCHC RBC-ENTMCNC: 33 GM/DL — SIGNIFICANT CHANGE UP (ref 32–36)
MCV RBC AUTO: 87.1 FL — SIGNIFICANT CHANGE UP (ref 80–100)
NRBC # BLD: 0 /100 WBCS — SIGNIFICANT CHANGE UP (ref 0–0)
PLATELET # BLD AUTO: 210 K/UL — SIGNIFICANT CHANGE UP (ref 150–400)
POTASSIUM SERPL-MCNC: 3.5 MMOL/L — SIGNIFICANT CHANGE UP (ref 3.5–5.3)
POTASSIUM SERPL-SCNC: 3.5 MMOL/L — SIGNIFICANT CHANGE UP (ref 3.5–5.3)
RBC # BLD: 3.65 M/UL — LOW (ref 3.8–5.2)
RBC # FLD: 13.3 % — SIGNIFICANT CHANGE UP (ref 10.3–14.5)
SODIUM SERPL-SCNC: 138 MMOL/L — SIGNIFICANT CHANGE UP (ref 135–145)
WBC # BLD: 9.19 K/UL — SIGNIFICANT CHANGE UP (ref 3.8–10.5)
WBC # FLD AUTO: 9.19 K/UL — SIGNIFICANT CHANGE UP (ref 3.8–10.5)

## 2018-06-01 RX ADMIN — AMLODIPINE BESYLATE 2.5 MILLIGRAM(S): 2.5 TABLET ORAL at 06:12

## 2018-06-01 RX ADMIN — VALSARTAN 40 MILLIGRAM(S): 80 TABLET ORAL at 17:10

## 2018-06-01 RX ADMIN — Medication 1.25 MILLIGRAM(S): at 22:54

## 2018-06-01 RX ADMIN — HEPARIN SODIUM 5000 UNIT(S): 5000 INJECTION INTRAVENOUS; SUBCUTANEOUS at 17:10

## 2018-06-01 RX ADMIN — Medication 100 MILLIGRAM(S): at 06:12

## 2018-06-01 RX ADMIN — HEPARIN SODIUM 5000 UNIT(S): 5000 INJECTION INTRAVENOUS; SUBCUTANEOUS at 06:12

## 2018-06-01 RX ADMIN — CLOPIDOGREL BISULFATE 75 MILLIGRAM(S): 75 TABLET, FILM COATED ORAL at 12:00

## 2018-06-01 NOTE — PROGRESS NOTE ADULT - SUBJECTIVE AND OBJECTIVE BOX
NEPHROLOGY INTERVAL HPI/OVERNIGHT EVENTS:  no new c/o   pressure dressing placed with no oozing    MEDICATIONS  (STANDING):  amLODIPine   Tablet 2.5 milliGRAM(s) Oral daily  clopidogrel Tablet 75 milliGRAM(s) Oral daily  heparin  Injectable 5000 Unit(s) SubCutaneous every 12 hours  metoprolol succinate  milliGRAM(s) Oral daily  valsartan 40 milliGRAM(s) Oral daily    MEDICATIONS  (PRN):  aluminum hydroxide/magnesium hydroxide/simethicone Suspension 30 milliLiter(s) Oral every 4 hours PRN Dyspepsia  enalaprilat Injectable 1.25 milliGRAM(s) IV Push every 6 hours PRN SBP>170  ondansetron Injectable 4 milliGRAM(s) IV Push every 6 hours PRN Nausea      Allergies    sulfa drugs (Unknown)    Intolerances        I&O's Detail    31 May 2018 07:  -  2018 07:00  --------------------------------------------------------  IN:    Oral Fluid: 300 mL  Total IN: 300 mL    OUT:  Total OUT: 0 mL    Total NET: 300 mL      2018 07:01  -  2018 11:22  --------------------------------------------------------  IN:  Total IN: 0 mL    OUT:    Voided: 225 mL  Total OUT: 225 mL    Total NET: -225 mL      Vital Signs Last 24 Hrs  T(C): 36.8 (2018 07:36), Max: 37 (31 May 2018 16:00)  T(F): 98.3 (2018 07:36), Max: 98.6 (31 May 2018 16:00)  HR: 71 (2018 10:00) (64 - 81)  BP: 138/86 (2018 10:00) (138/86 - 188/77)  BP(mean): 86 (31 May 2018 22:00) (86 - 100)  RR: 21 (2018 06:00) (16 - 22)  SpO2: 97% (2018 08:00) (93% - 100%)  Daily     Daily Weight in k.3 (2018 05:09)    PHYSICAL EXAM:  General: alert. awake Ox3  HEENT: MMM  CV: s1s2 rrr  LUNGS: B/L CTA  EXT: no edema    LABS:                        10.5   9.19  )-----------( 210      ( 2018 06:03 )             31.8     06    138  |  105  |  21  ----------------------------<  73  3.5   |  24  |  2.89<H>    Ca    8.5      2018 06:03  Phos  3.6         TPro  x   /  Alb  3.0<L>  /  TBili  x   /  DBili  x   /  AST  x   /  ALT  x   /  AlkPhos  x

## 2018-06-01 NOTE — PROGRESS NOTE ADULT - SUBJECTIVE AND OBJECTIVE BOX
HOSPITAL COURSE AND ASSESSMENT  89 yo WF ho Htn, Ckd stg V, Htn, CAD s/p PCI many yrs ago,  presented to the ED for a Shiley catheter placement in order to initiate HD. Patient's renal failure has worsened over the past few months with lower extremity edema and leg pain. She had an AVF placed in the right anticubital area one month ago.    Pt was admitted to telemetry and HD was initiated. She did have some oozing from her catheter site which required pressure dressing. Vascular followed and felt no intervention needed. Renal involved for HD.     Anticipate discharge in AM after HD session with final PT recs. To start outpatient HD on Tuesday.      *ESRD with initiation of HD this admission associated with fluid overload  -S/P Shiley placement, AVF still immature for cannulation  -tolerated 2 sessions of HD, now TIW HD  -Outpatient HD facility arranged and accepted at [facility] in [place]  -Renal follow up appreciated    *Anemia of CKD with acute blood loss/symptomatic anemia  -blood loss with catheter oozing, now s/p 1 unit PRBC for weakness  -LEXX       *HTN: stable  -Toprol, Norvasc, Diovan   -clonidine stopped per renal    *CAD s/p PCI:  cw Plavix        ----------------------------------------------------------------------------------------------  CHIEF COMPLAINT:  ESRD    SUBJECTIVE:     tolerating PO. OOB to chair, though wants to lay down. also wants to go home. no more oozing/bleeding    REVIEW OF SYSTEMS:  All other review of systems is negative unless indicated above    Vital Signs Last 24 Hrs  T(C): 36.8 (01 Jun 2018 07:36), Max: 37 (31 May 2018 16:00)  T(F): 98.3 (01 Jun 2018 07:36), Max: 98.6 (31 May 2018 16:00)  HR: 71 (01 Jun 2018 10:00) (64 - 81)  BP: 138/86 (01 Jun 2018 10:00) (138/86 - 188/77)  BP(mean): 86 (31 May 2018 22:00) (86 - 100)  RR: 21 (01 Jun 2018 06:00) (16 - 22)  SpO2: 97% (01 Jun 2018 08:00) (93% - 99%)  CAPILLARY BLOOD GLUCOSE          PHYSICAL EXAM:  Constitutional: NAD, NCAT  HEENT: EOMI, Normal Hearing, MMM, fair dentition  Neck: trachea midline, No JVD  Respiratory: Breath sounds are clear, unlabored respiration  Cardiovascular: S1 and S2, regular rate   Gastrointestinal: Bowel Sounds present, soft, nontender, nondistended  Extremities: No peripheral edema  Vascular: 2+ radial pulse  Neurological: awake, alert, follows commands, sensation grossly intact  Psych: appropriate affect,  insight  Musculoskeletal: moves all extremities  Skin: No rashes    ALLERGIES  sulfa drugs (Unknown)      MEDICATIONS  (STANDING):  amLODIPine   Tablet 2.5 milliGRAM(s) Oral daily  clopidogrel Tablet 75 milliGRAM(s) Oral daily  heparin  Injectable 5000 Unit(s) SubCutaneous every 12 hours  metoprolol succinate  milliGRAM(s) Oral daily  valsartan 40 milliGRAM(s) Oral daily      LABS: All Labs Reviewed:                        10.5   9.19  )-----------( 210      ( 01 Jun 2018 06:03 )             31.8     06-01    138  |  105  |  21  ----------------------------<  73  3.5   |  24  |  2.89<H>    Ca    8.5      01 Jun 2018 06:03  Phos  3.6     05-31    TPro  x   /  Alb  3.0<L>  /  TBili  x   /  DBili  x   /  AST  x   /  ALT  x   /  AlkPhos  x   05-31          Blood Culture:

## 2018-06-01 NOTE — PROGRESS NOTE ADULT - SUBJECTIVE AND OBJECTIVE BOX
dressing on catheter clean and dry    Ok for discharge from my standpoint    MEDICATIONS  (STANDING):  amLODIPine   Tablet 2.5 milliGRAM(s) Oral daily  clopidogrel Tablet 75 milliGRAM(s) Oral daily  heparin  Injectable 5000 Unit(s) SubCutaneous every 12 hours  metoprolol succinate  milliGRAM(s) Oral daily  valsartan 40 milliGRAM(s) Oral daily    MEDICATIONS  (PRN):  aluminum hydroxide/magnesium hydroxide/simethicone Suspension 30 milliLiter(s) Oral every 4 hours PRN Dyspepsia  enalaprilat Injectable 1.25 milliGRAM(s) IV Push every 6 hours PRN SBP>170  ondansetron Injectable 4 milliGRAM(s) IV Push every 6 hours PRN Nausea      Allergies    sulfa drugs (Unknown)    Intolerances        Flatus: [ ] YES [ ] NO             Bowel Movement: [ ] YES [ ] NO  Pain (0-10):            Pain Control Adequate: [ ] YES [ ] NO  Nausea: [ ] YES [ ] NO            Vomiting: [ ] YES [ ] NO  Diarrhea: [ ] YES [ ] NO         Constipation: [ ] YES [ ] NO     Chest Pain: [ ] YES [ ] NO    SOB:  [ ] YES [ ] NO    Vital Signs Last 24 Hrs  T(C): 36.8 (01 Jun 2018 07:36), Max: 37 (31 May 2018 16:00)  T(F): 98.3 (01 Jun 2018 07:36), Max: 98.6 (31 May 2018 16:00)  HR: 71 (01 Jun 2018 10:00) (64 - 81)  BP: 138/86 (01 Jun 2018 10:00) (138/86 - 188/77)  BP(mean): 86 (31 May 2018 22:00) (86 - 100)  RR: 21 (01 Jun 2018 06:00) (16 - 22)  SpO2: 97% (01 Jun 2018 08:00) (93% - 99%)    I&O's Summary    31 May 2018 07:01  -  01 Jun 2018 07:00  --------------------------------------------------------  IN: 300 mL / OUT: 0 mL / NET: 300 mL    01 Jun 2018 07:01 - 01 Jun 2018 12:02  --------------------------------------------------------  IN: 0 mL / OUT: 225 mL / NET: -225 mL        Physical Exam:  General: NAD, resting comfortably  Pulmonary: normal resp effort, CTA-B  Cardiovascular: NSR  Abdominal: soft, NT/ND  Extremities: WWP, normal strength  Neuro: A/O x 3, CNs II-XII grossly intact, normal motor/sensation, no focal deficits  Pulses:   Right:                                                                          Left:  FEM [ ]2+ [ ]1+ [ ]doppler                                             FEM [ ]2+ [ ]1+ [ ]doppler    POP [ ]2+ [ ]1+ [ ]doppler                                             POP [ ]2+ [ ]1+ [ ]doppler    DP [ ]2+ [ ]1+ [ ]doppler                                                DP [ ]2+ [ ]1+ [ ]doppler  PT[ ]2+ [ ]1+ [ ]doppler                                                  PT [ ]2+ [ ]1+ [ ]doppler    LABS:                        10.5   9.19  )-----------( 210      ( 01 Jun 2018 06:03 )             31.8     06-01    138  |  105  |  21  ----------------------------<  73  3.5   |  24  |  2.89<H>    Ca    8.5      01 Jun 2018 06:03  Phos  3.6     05-31    TPro  x   /  Alb  3.0<L>  /  TBili  x   /  DBili  x   /  AST  x   /  ALT  x   /  AlkPhos  x   05-31        LIVER FUNCTIONS - ( 31 May 2018 10:45 )  Alb: 3.0 g/dL / Pro: x     / ALK PHOS: x     / ALT: x     / AST: x     / GGT: x           CAPILLARY BLOOD GLUCOSE          RADIOLOGY & ADDITIONAL TESTS:

## 2018-06-01 NOTE — PHYSICAL THERAPY INITIAL EVALUATION ADULT - GENERAL OBSERVATIONS, REHAB EVAL
Pt found supine in bed with CCU monitors, and bed alarm activated. Pt found supine in bed with CCU monitors, and bed alarm activated. Pt with no c/o pain.

## 2018-06-01 NOTE — PROGRESS NOTE ADULT - ASSESSMENT
89 yo woman with CKD now progressed to end stage with moderate uremic symptoms.  AVF still immature for cannulation.  --ESRD : permcath placement and initiation of HD in am  --Anemia of CKD.  To be started on LEXX once HD starts.  --Fluid overload due to ESRD : UF with HD.  --HTN : monitor for adjustments of meds with initiation of dialysis.    5/30 SY  --ESRD : 1st HD tx initiated via Right IJ permcath uneventfully.  2 hour treatment.  --Anemia of CKD with acute blood loss.  Transfuse one unit due to weakness and start LEXX.  --BP improved : adjust meds.    5/31 SY  --ESRD : second HD initiated uneventfully.   Will maintain on TIW HD now.  Out patient HD facility arranged and accepted at [facility] [place].  --Fluid status stable.  --HTN : Clonidine d/ashwini to avoid central acting agent.   Add Diovan to current regimen.  --Surgical PA to follow up for catheter insertion site bleeding.    6/1 MK  - ESRD next hd in am and if stable no renal barrier for dc with start at outpt unit hd on tuesday   - HTN; continue with current bp regimen  - catheter site stable

## 2018-06-02 LAB
ALBUMIN SERPL ELPH-MCNC: 3.3 G/DL — SIGNIFICANT CHANGE UP (ref 3.3–5)
ANION GAP SERPL CALC-SCNC: 11 MMOL/L — SIGNIFICANT CHANGE UP (ref 5–17)
BUN SERPL-MCNC: 33 MG/DL — HIGH (ref 7–23)
CALCIUM SERPL-MCNC: 8.8 MG/DL — SIGNIFICANT CHANGE UP (ref 8.5–10.1)
CHLORIDE SERPL-SCNC: 104 MMOL/L — SIGNIFICANT CHANGE UP (ref 96–108)
CO2 SERPL-SCNC: 24 MMOL/L — SIGNIFICANT CHANGE UP (ref 22–31)
CREAT SERPL-MCNC: 3.98 MG/DL — HIGH (ref 0.5–1.3)
GLUCOSE SERPL-MCNC: 110 MG/DL — HIGH (ref 70–99)
PHOSPHATE SERPL-MCNC: 3.6 MG/DL — SIGNIFICANT CHANGE UP (ref 2.5–4.5)
POTASSIUM SERPL-MCNC: 3.7 MMOL/L — SIGNIFICANT CHANGE UP (ref 3.5–5.3)
POTASSIUM SERPL-SCNC: 3.7 MMOL/L — SIGNIFICANT CHANGE UP (ref 3.5–5.3)
SODIUM SERPL-SCNC: 139 MMOL/L — SIGNIFICANT CHANGE UP (ref 135–145)

## 2018-06-02 RX ADMIN — HEPARIN SODIUM 5000 UNIT(S): 5000 INJECTION INTRAVENOUS; SUBCUTANEOUS at 17:13

## 2018-06-02 RX ADMIN — VALSARTAN 40 MILLIGRAM(S): 80 TABLET ORAL at 06:22

## 2018-06-02 RX ADMIN — CLOPIDOGREL BISULFATE 75 MILLIGRAM(S): 75 TABLET, FILM COATED ORAL at 17:14

## 2018-06-02 RX ADMIN — AMLODIPINE BESYLATE 2.5 MILLIGRAM(S): 2.5 TABLET ORAL at 06:23

## 2018-06-02 RX ADMIN — HEPARIN SODIUM 5000 UNIT(S): 5000 INJECTION INTRAVENOUS; SUBCUTANEOUS at 06:25

## 2018-06-02 RX ADMIN — Medication 100 MILLIGRAM(S): at 06:25

## 2018-06-02 NOTE — PROGRESS NOTE ADULT - ASSESSMENT
89 yo woman with CKD now progressed to end stage with moderate uremic symptoms.  AVF still immature for cannulation.  --ESRD : permcath placement and initiation of HD in am  --Anemia of CKD.  To be started on LEXX once HD starts.  --Fluid overload due to ESRD : UF with HD.  --HTN : monitor for adjustments of meds with initiation of dialysis.    5/30 SY  --ESRD : 1st HD tx initiated via Right IJ permcath uneventfully.  2 hour treatment.  --Anemia of CKD with acute blood loss.  Transfuse one unit due to weakness and start LEXX.  --BP improved : adjust meds.    5/31 SY  --ESRD : second HD initiated uneventfully.   Will maintain on TIW HD now.  Out patient HD facility arranged and accepted at [facility] in [place].  --Fluid status stable.  --HTN : Clonidine d/ashwini to avoid central acting agent.   Add Diovan to current regimen.  --Surgical PA to follow up for catheter insertion site bleeding.    6/1 MK  - ESRD next hd in am and if stable no renal barrier for dc with start at outpt unit hd on tuesday   - HTN; continue with current bp regimen  - catheter site stable    6/2  s/p hd  syncope, recovered quickly w IV saline resuscitation  d/w Dr Christopher  May be dc home if ambulates well in the hallways  Family here to pick her up  will be conservative in fluid removal as outpt 89 yo woman with CKD now progressed to end stage with moderate uremic symptoms.  AVF still immature for cannulation.  --ESRD : permcath placement and initiation of HD in am  --Anemia of CKD.  To be started on LEXX once HD starts.  --Fluid overload due to ESRD : UF with HD.  --HTN : monitor for adjustments of meds with initiation of dialysis.    5/30 SY  --ESRD : 1st HD tx initiated via Right IJ permcath uneventfully.  2 hour treatment.  --Anemia of CKD with acute blood loss.  Transfuse one unit due to weakness and start LEXX.  --BP improved : adjust meds.    5/31 SY  --ESRD : second HD initiated uneventfully.   Will maintain on TIW HD now.  Out patient HD facility arranged and accepted at [facility] in [place].  --Fluid status stable.  --HTN : Clonidine d/ashwini to avoid central acting agent.   Add Diovan to current regimen.  --Surgical PA to follow up for catheter insertion site bleeding.    6/1 MK  - ESRD next hd in am and if stable no renal barrier for dc with start at outpt unit hd on tuesday   - HTN; continue with current bp regimen  - catheter site stable    6/2  s/p hd  syncope, recovered quickly w IV saline resuscitation  d/w Dr Christopher  May be dc home if ambulates well in the hallways  Family here to pick her up  will be conservative in fluid removal as outpt    Addendum:  Discussed with the hospitalist.  The patient will be kept in the hospital for further evaluation.

## 2018-06-02 NOTE — PROGRESS NOTE ADULT - SUBJECTIVE AND OBJECTIVE BOX
CHIEF COMPLAINT:  ESRD      HOSPITAL COURSE AND ASSESSMENT  89 yo WF ho Htn, Ckd stg V, Htn, CAD s/p PCI many yrs ago,  presented to the ED for a Shiley catheter placement in order to initiate HD. Patient's renal failure has worsened over the past few months with lower extremity edema and leg pain. She had an AVF placed in the right anticubital area one month ago.  Pt was  and HD was initiated. She did have some Bleeding  from her catheter site which required pressure dressing, also was transfused 1U of PRBCs.  Vascular followed and felt no further  intervention needed.   Hospital course notable for episode of syncope. Pt was found in the bathroom unconscious, but  was back in less then one min. RR was called and suggested Pt to be transferred to Telemetry and likely episode due to vasovagal syncope        SUBJECTIVE:  Pt was seen and examined this am, awaiting for HD. Pt repotted no complains. Felt well. No SOB. States  LE edema much improved.  Got call from 3N that Pt is on HD and had episode of syncope. Pt was given bolus of 750ml by  DR Cohen. Pt completed HD.  Reevaluated on #N and Pt reports feels better, no dizziness, no CP or SOB. States that felt lightheaded and became hot and then does not remember what happened, when woke up, HD staff was around her. Pt reports that did have episodes of syncope in the past. Did not see cardio at that time, also does not follow any cardiologist now.   Pts orthostatics done and + for BP drop from 180s to 140s          REVIEW OF SYSTEMS:  All other review of systems is negative unless indicated above    Vital Signs Last 24 Hrs  T(C): 37 (02 Jun 2018 15:21), Max: 37.3 (02 Jun 2018 10:15)  T(F): 98.6 (02 Jun 2018 15:21), Max: 99.1 (02 Jun 2018 10:15)  HR: 96 (02 Jun 2018 14:11) (67 - 96)  BP: 140/88 (02 Jun 2018 14:11) (127/67 - 180/73)  RR: 18 (02 Jun 2018 15:21) (16 - 18)  SpO2: 97% (02 Jun 2018 15:21) (94% - 97%)        PHYSICAL EXAM:  Constitutional: NAD, NCAT  HEENT: EOMI, Normal Hearing, MMM, fair dentition  Neck: trachea midline, No JVD  Respiratory: Breath sounds are clear, unlabored respiration  Cardiovascular: S1 and S2, regular rate   Gastrointestinal: Bowel Sounds present, soft, nontender, nondistended  Extremities: No peripheral edema  Vascular: 2+ radial pulse  Neurological: awake, alert, follows commands, grossly non focal   Psych: appropriate affect,   Musculoskeletal: moves all extremities  Skin: No rashes      MEDICATIONS  (STANDING):  amLODIPine   Tablet 2.5 milliGRAM(s) Oral daily  clopidogrel Tablet 75 milliGRAM(s) Oral daily  heparin  Injectable 5000 Unit(s) SubCutaneous every 12 hours  metoprolol succinate  milliGRAM(s) Oral daily  valsartan 40 milliGRAM(s) Oral daily    MEDICATIONS  (PRN):  aluminum hydroxide/magnesium hydroxide/simethicone Suspension 30 milliLiter(s) Oral every 4 hours PRN Dyspepsia  enalaprilat Injectable 1.25 milliGRAM(s) IV Push every 6 hours PRN SBP>170  ondansetron Injectable 4 milliGRAM(s) IV Push every 6 hours PRN Nausea         LABS:                    10.5   9.19  )-----------( 210      ( 01 Jun 2018 06:03 )             31.8     06-02    139  |  104  |  33<H>  ----------------------------<  110<H>  3.7   |  24  |  3.98<H>    Ca    8.8      02 Jun 2018 11:15  Phos  3.6     06-02    TPro  x   /  Alb  3.3  /  TBili  x   /  DBili  x   /  AST  x   /  ALT  x   /  AlkPhos  x   06-02        LIVER FUNCTIONS - ( 02 Jun 2018 11:15 )  Alb: 3.3 g/dL / Pro: x     / ALK PHOS: x     / ALT: x     / AST: x     / GGT: x                         A/P:     *ESRD with initiation of HD this admission associated with fluid overload  -S/P Shiley placement, AVF still immature for cannulation  -tolerated 3  sessions of HD, now plan for  TIW HD, next session on Tuesday set up outPt  -Outpatient HD facility arranged and accepted at [facility] in [place]  -D/w DR Cohen       * Syncope with positive orthostatics  likely due to hypovolemia?  S/p IVF at HD, received 750ml  bolus   telemetry reviewed, in SR, no arrhythmias noted at time of event   Will continue monitor Orthostatic VS   Oral hydration   Fall precaution   ECHO  B12/folate/TSH   Cardio eval        *Anemia of CKD with acute blood loss/symptomatic anemia  -blood loss with catheter oozing, now s/p 1 unit PRBC for weakness  -LEXX   - H/H stable         *HTN  - BP elevated this am, improved after meds   - off clonidine now  -Toprol, Norvasc, Diovan  all was given today  - BP elevated after HD   - D/w DR Royal, will monitor for now    * H/o CAD s/p PCI:  - cw Plavix, BB.       * DVT PPxs       Dispo: continue to monitor on tele, reeval in am.  CArdio eval. D/w CM, Pt will need HC at discharge.

## 2018-06-02 NOTE — PROGRESS NOTE ADULT - SUBJECTIVE AND OBJECTIVE BOX
no bleeding: dressing dry X 48 hours; no hematoma    can be discharged from vascular standpoint    MEDICATIONS  (STANDING):  amLODIPine   Tablet 2.5 milliGRAM(s) Oral daily  clopidogrel Tablet 75 milliGRAM(s) Oral daily  heparin  Injectable 5000 Unit(s) SubCutaneous every 12 hours  metoprolol succinate  milliGRAM(s) Oral daily  valsartan 40 milliGRAM(s) Oral daily    MEDICATIONS  (PRN):  aluminum hydroxide/magnesium hydroxide/simethicone Suspension 30 milliLiter(s) Oral every 4 hours PRN Dyspepsia  enalaprilat Injectable 1.25 milliGRAM(s) IV Push every 6 hours PRN SBP>170  ondansetron Injectable 4 milliGRAM(s) IV Push every 6 hours PRN Nausea      Allergies    sulfa drugs (Unknown)    Intolerances        Flatus: [ ] YES [ ] NO             Bowel Movement: [ ] YES [ ] NO  Pain (0-10):            Pain Control Adequate: [ ] YES [ ] NO  Nausea: [ ] YES [ ] NO            Vomiting: [ ] YES [ ] NO  Diarrhea: [ ] YES [ ] NO         Constipation: [ ] YES [ ] NO     Chest Pain: [ ] YES [ ] NO    SOB:  [ ] YES [ ] NO    Vital Signs Last 24 Hrs  T(C): 36.9 (02 Jun 2018 05:04), Max: 36.9 (02 Jun 2018 05:04)  T(F): 98.5 (02 Jun 2018 05:04), Max: 98.5 (02 Jun 2018 05:04)  HR: 72 (02 Jun 2018 05:04) (69 - 79)  BP: 169/60 (02 Jun 2018 05:04) (138/86 - 188/77)  BP(mean): --  RR: 18 (01 Jun 2018 22:14) (18 - 18)  SpO2: 94% (02 Jun 2018 05:04) (94% - 97%)    I&O's Summary    01 Jun 2018 07:01  -  02 Jun 2018 07:00  --------------------------------------------------------  IN: 0 mL / OUT: 225 mL / NET: -225 mL        Physical Exam:  General: NAD, resting comfortably  Pulmonary: normal resp effort, CTA-B  Cardiovascular: NSR  Abdominal: soft, NT/ND  Extremities: WWP, normal strength  Neuro: A/O x 3, CNs II-XII grossly intact, normal motor/sensation, no focal deficits  Pulses:   Right:                                                                          Left:  FEM [ ]2+ [ ]1+ [ ]doppler                                             FEM [ ]2+ [ ]1+ [ ]doppler    POP [ ]2+ [ ]1+ [ ]doppler                                             POP [ ]2+ [ ]1+ [ ]doppler    DP [ ]2+ [ ]1+ [ ]doppler                                                DP [ ]2+ [ ]1+ [ ]doppler  PT[ ]2+ [ ]1+ [ ]doppler                                                  PT [ ]2+ [ ]1+ [ ]doppler    LABS:                        10.5   9.19  )-----------( 210      ( 01 Jun 2018 06:03 )             31.8     06-01    138  |  105  |  21  ----------------------------<  73  3.5   |  24  |  2.89<H>    Ca    8.5      01 Jun 2018 06:03  Phos  3.6     05-31    TPro  x   /  Alb  3.0<L>  /  TBili  x   /  DBili  x   /  AST  x   /  ALT  x   /  AlkPhos  x   05-31        LIVER FUNCTIONS - ( 31 May 2018 10:45 )  Alb: 3.0 g/dL / Pro: x     / ALK PHOS: x     / ALT: x     / AST: x     / GGT: x           CAPILLARY BLOOD GLUCOSE          RADIOLOGY & ADDITIONAL TESTS:

## 2018-06-02 NOTE — PROGRESS NOTE ADULT - SUBJECTIVE AND OBJECTIVE BOX
NEPHROLOGY INTERVAL HPI/OVERNIGHT EVENTS:  no new c/o   pressure dressing placed with no oozing    6/2seen on hd  syncope on hd w fluid removal  total of 750 ml given back + 500 ml rinse back  feels well  no residual    MEDICATIONS  (STANDING):  amLODIPine   Tablet 2.5 milliGRAM(s) Oral daily  clopidogrel Tablet 75 milliGRAM(s) Oral daily  heparin  Injectable 5000 Unit(s) SubCutaneous every 12 hours  metoprolol succinate  milliGRAM(s) Oral daily  valsartan 40 milliGRAM(s) Oral daily    MEDICATIONS  (PRN):  aluminum hydroxide/magnesium hydroxide/simethicone Suspension 30 milliLiter(s) Oral every 4 hours PRN Dyspepsia  enalaprilat Injectable 1.25 milliGRAM(s) IV Push every 6 hours PRN SBP>170  ondansetron Injectable 4 milliGRAM(s) IV Push every 6 hours PRN Nausea      Allergies    sulfa drugs (Unknown)    Intolerances        I&O's Detail    01 Jun 2018 07:01  -  02 Jun 2018 07:00  --------------------------------------------------------  IN:  Total IN: 0 mL    OUT:    Voided: 225 mL  Total OUT: 225 mL    Total NET: -225 mL        Vital Signs Last 24 Hrs  T(C): 37.3 (02 Jun 2018 10:15), Max: 37.3 (02 Jun 2018 10:15)  T(F): 99.1 (02 Jun 2018 10:15), Max: 99.1 (02 Jun 2018 10:15)  HR: 96 (02 Jun 2018 14:11) (69 - 96)  BP: 140/88 (02 Jun 2018 14:11) (138/72 - 180/73)  BP(mean): --  RR: 17 (02 Jun 2018 14:11) (16 - 18)  SpO2: 97% (02 Jun 2018 10:15) (94% - 97%)    PHYSICAL EXAM:  General: alert. awake Ox3  HEENT: MMM  CV: s1s2 rrr  LUNGS: B/L CTA  EXT: no edema    LABS:                          10.5   9.19  )-----------( 210      ( 01 Jun 2018 06:03 )             31.8       06-02    139  |  104  |  33<H>  ----------------------------<  110<H>  3.7   |  24  |  3.98<H>    Ca    8.8      02 Jun 2018 11:15  Phos  3.6     06-02    TPro  x   /  Alb  3.3  /  TBili  x   /  DBili  x   /  AST  x   /  ALT  x   /  AlkPhos  x   06-02

## 2018-06-03 ENCOUNTER — TRANSCRIPTION ENCOUNTER (OUTPATIENT)
Age: 83
End: 2018-06-03

## 2018-06-03 VITALS
RESPIRATION RATE: 18 BRPM | TEMPERATURE: 98 F | HEART RATE: 90 BPM | SYSTOLIC BLOOD PRESSURE: 115 MMHG | DIASTOLIC BLOOD PRESSURE: 58 MMHG | OXYGEN SATURATION: 99 %

## 2018-06-03 LAB
ANION GAP SERPL CALC-SCNC: 11 MMOL/L — SIGNIFICANT CHANGE UP (ref 5–17)
BUN SERPL-MCNC: 23 MG/DL — SIGNIFICANT CHANGE UP (ref 7–23)
CALCIUM SERPL-MCNC: 8.7 MG/DL — SIGNIFICANT CHANGE UP (ref 8.5–10.1)
CHLORIDE SERPL-SCNC: 103 MMOL/L — SIGNIFICANT CHANGE UP (ref 96–108)
CO2 SERPL-SCNC: 24 MMOL/L — SIGNIFICANT CHANGE UP (ref 22–31)
CREAT SERPL-MCNC: 2.79 MG/DL — HIGH (ref 0.5–1.3)
FOLATE SERPL-MCNC: 11.6 NG/ML — SIGNIFICANT CHANGE UP
GLUCOSE SERPL-MCNC: 69 MG/DL — LOW (ref 70–99)
HCT VFR BLD CALC: 30.5 % — LOW (ref 34.5–45)
HGB BLD-MCNC: 10.1 G/DL — LOW (ref 11.5–15.5)
MCHC RBC-ENTMCNC: 29.3 PG — SIGNIFICANT CHANGE UP (ref 27–34)
MCHC RBC-ENTMCNC: 33.1 GM/DL — SIGNIFICANT CHANGE UP (ref 32–36)
MCV RBC AUTO: 88.4 FL — SIGNIFICANT CHANGE UP (ref 80–100)
NRBC # BLD: 0 /100 WBCS — SIGNIFICANT CHANGE UP (ref 0–0)
PLATELET # BLD AUTO: 215 K/UL — SIGNIFICANT CHANGE UP (ref 150–400)
POTASSIUM SERPL-MCNC: 3.5 MMOL/L — SIGNIFICANT CHANGE UP (ref 3.5–5.3)
POTASSIUM SERPL-SCNC: 3.5 MMOL/L — SIGNIFICANT CHANGE UP (ref 3.5–5.3)
RBC # BLD: 3.45 M/UL — LOW (ref 3.8–5.2)
RBC # FLD: 13.4 % — SIGNIFICANT CHANGE UP (ref 10.3–14.5)
SODIUM SERPL-SCNC: 138 MMOL/L — SIGNIFICANT CHANGE UP (ref 135–145)
T3FREE SERPL-MCNC: 1.66 PG/ML — LOW (ref 1.8–4.6)
T4 AB SER-ACNC: 8.4 UG/DL — SIGNIFICANT CHANGE UP (ref 4.6–12)
TSH SERPL-MCNC: 0.92 UIU/ML — SIGNIFICANT CHANGE UP (ref 0.36–3.74)
VIT B12 SERPL-MCNC: 1288 PG/ML — HIGH (ref 232–1245)
WBC # BLD: 9.43 K/UL — SIGNIFICANT CHANGE UP (ref 3.8–10.5)
WBC # FLD AUTO: 9.43 K/UL — SIGNIFICANT CHANGE UP (ref 3.8–10.5)

## 2018-06-03 RX ORDER — VALSARTAN 80 MG/1
1 TABLET ORAL
Qty: 30 | Refills: 0 | OUTPATIENT
Start: 2018-06-03 | End: 2018-07-02

## 2018-06-03 RX ADMIN — HEPARIN SODIUM 5000 UNIT(S): 5000 INJECTION INTRAVENOUS; SUBCUTANEOUS at 05:30

## 2018-06-03 RX ADMIN — CLOPIDOGREL BISULFATE 75 MILLIGRAM(S): 75 TABLET, FILM COATED ORAL at 11:22

## 2018-06-03 RX ADMIN — AMLODIPINE BESYLATE 2.5 MILLIGRAM(S): 2.5 TABLET ORAL at 05:30

## 2018-06-03 RX ADMIN — VALSARTAN 40 MILLIGRAM(S): 80 TABLET ORAL at 05:30

## 2018-06-03 RX ADMIN — Medication 100 MILLIGRAM(S): at 05:30

## 2018-06-03 NOTE — DISCHARGE NOTE ADULT - CARE PROVIDER_API CALL
Rai Cohen (DO), Nephrology  [address]  [address]  [place] [address]  Phone: [phone]  Fax: [phone]    Kulwinder Campos), Internal Medicine  [address]  [address] [address]  Phone: [phone]  Fax: [phone]

## 2018-06-03 NOTE — DISCHARGE NOTE ADULT - MEDICATION SUMMARY - MEDICATIONS TO STOP TAKING
I will STOP taking the medications listed below when I get home from the hospital:    CLONIDINE HCL .1 MG TABS  -- 1  by mouth once a day (at bedtime)

## 2018-06-03 NOTE — DISCHARGE NOTE ADULT - PATIENT PORTAL LINK FT
You can access the [facility] Patient Portal, offered by [facility], by registering with the following website: [email or web address]

## 2018-06-03 NOTE — PROGRESS NOTE ADULT - SUBJECTIVE AND OBJECTIVE BOX
89 yo WF ho Htn, Ckd stg V, Htn, CAD s/p PCI many yrs ago,  presented to the ED for a Shiley catheter placement in order to initiate HD. Patient's renal failure has worsened over the past few months with lower extremity edema and leg pain. She had an AVF placed in the right anticubital area one month ago.  Pt was  and HD was initiated. She did have some Bleeding  from her catheter site which required pressure dressing, also was transfused 1U of PRBCs.  Vascular followed and felt no further  intervention needed.   Hospital course notable for episode of syncope. Pt was found in the bathroom unconscious, but  was back in less then one min. RR was called and suggested Pt to be transferred to Telemetry and likely episode due to vasovagal syncope      6/3: no c/o wants to go home    Vital Signs Last 24 Hrs  T(C): 36.3 (03 Jun 2018 05:21), Max: 37 (02 Jun 2018 15:21)  T(F): 97.4 (03 Jun 2018 05:21), Max: 98.6 (02 Jun 2018 15:21)  HR: 73 (03 Jun 2018 05:21) (67 - 96)  BP: 168/69 (03 Jun 2018 05:21) (127/67 - 168/69)  BP(mean): --  RR: 18 (03 Jun 2018 05:21) (16 - 18)  SpO2: 95% (03 Jun 2018 05:21) (95% - 97%)        PHYSICAL EXAM:  Constitutional: NAD, NCAT  HEENT: EOMI, Normal Hearing, MMM, fair dentition  Neck: trachea midline, No JVD  Respiratory: Breath sounds are clear, unlabored respiration  Cardiovascular: S1 and S2, regular rate   Gastrointestinal: Bowel Sounds present, soft, nontender, nondistended  Extremities: No peripheral edema  Vascular: 2+ radial pulse  Neurological: awake, alert, follows commands, grossly non focal   Psych: appropriate affect,   Musculoskeletal: moves all extremities  Skin: No rashes           LABS:                    10.5   9.19  )-----------( 210      ( 01 Jun 2018 06:03 )             31.8     06-02    139  |  104  |  33<H>  ----------------------------<  110<H>  3.7   |  24  |  3.98<H>    Ca    8.8      02 Jun 2018 11:15  Phos  3.6     06-02    TPro  x   /  Alb  3.3  /  TBili  x   /  DBili  x   /  AST  x   /  ALT  x   /  AlkPhos  x   06-02        LIVER FUNCTIONS - ( 02 Jun 2018 11:15 )  Alb: 3.3 g/dL / Pro: x     / ALK PHOS: x     / ALT: x     / AST: x     / GGT: x                         A/P:     *ESRD with initiation of HD this admission associated with fluid overload  -S/P Shiley placement, AVF still immature for cannulation  -Outpatient HD facility arranged and accepted at [facility] in [place]  -D/w DR Cohen       * Syncope with positive orthostatics  HD related; asymptomativ now      *Anemia of CKD with acute blood loss/symptomatic anemia      * H/o CAD s/p PCI:  - cw Plavix, BB.

## 2018-06-03 NOTE — PROGRESS NOTE ADULT - PROVIDER SPECIALTY LIST ADULT
Hospitalist
Nephrology
Surgery
Surgery
Vascular Surgery
Nephrology

## 2018-06-03 NOTE — DISCHARGE NOTE ADULT - CARE PLAN
Principal Discharge DX:	ESRD (end stage renal disease)  Goal:	stable  Assessment and plan of treatment:	hemodialysis

## 2018-06-03 NOTE — DISCHARGE NOTE ADULT - MEDICATION SUMMARY - MEDICATIONS TO TAKE
I will START or STAY ON the medications listed below when I get home from the hospital:    aspirin 81 mg oral tablet  -- 1 tab(s) by mouth once a day  -- Indication: For .    valsartan 40 mg oral tablet  -- 1 tab(s) by mouth once a day  -- Indication: For blood pressure    sodium bicarbonate 325 mg oral tablet  -- orally 2 times a day  -- Indication: For .    CLOPIDOGREL 75 MG TABS  -- 1  by mouth once a day  -- Indication: For .    METOPROLOL SUCCINATE  MG TB24  -- 1  by mouth once a day (at bedtime)  -- Indication: For .    AMLODIPINE BESYLATE 2.5 MG TABS  -- 1  by mouth once a day  -- Indication: For .

## 2018-06-07 DIAGNOSIS — D63.1 ANEMIA IN CHRONIC KIDNEY DISEASE: ICD-10-CM

## 2018-06-07 DIAGNOSIS — N18.6 END STAGE RENAL DISEASE: ICD-10-CM

## 2018-06-07 DIAGNOSIS — I12.0 HYPERTENSIVE CHRONIC KIDNEY DISEASE WITH STAGE 5 CHRONIC KIDNEY DISEASE OR END STAGE RENAL DISEASE: ICD-10-CM

## 2018-06-07 DIAGNOSIS — I25.10 ATHEROSCLEROTIC HEART DISEASE OF NATIVE CORONARY ARTERY WITHOUT ANGINA PECTORIS: ICD-10-CM

## 2018-06-07 DIAGNOSIS — Z88.2 ALLERGY STATUS TO SULFONAMIDES: ICD-10-CM

## 2018-06-07 DIAGNOSIS — Z90.721 ACQUIRED ABSENCE OF OVARIES, UNILATERAL: ICD-10-CM

## 2018-06-07 DIAGNOSIS — Z79.82 LONG TERM (CURRENT) USE OF ASPIRIN: ICD-10-CM

## 2018-06-07 DIAGNOSIS — Z95.5 PRESENCE OF CORONARY ANGIOPLASTY IMPLANT AND GRAFT: ICD-10-CM

## 2018-06-07 DIAGNOSIS — E87.70 FLUID OVERLOAD, UNSPECIFIED: ICD-10-CM

## 2018-06-07 DIAGNOSIS — R55 SYNCOPE AND COLLAPSE: ICD-10-CM

## 2018-06-07 DIAGNOSIS — D62 ACUTE POSTHEMORRHAGIC ANEMIA: ICD-10-CM

## 2018-06-07 DIAGNOSIS — E78.5 HYPERLIPIDEMIA, UNSPECIFIED: ICD-10-CM

## 2018-11-23 NOTE — PROGRESS NOTE ADULT - PROBLEM SELECTOR PROBLEM 7
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  Possible concern for retained appendix as no appendix included in 8/11 pathology report. Plan for IV abx outpatient through PICC until definitive surgical repair in late December    PLAN:  -reg diet  -c/w IV amikacin, meropenem  -DVT PPX  -Pain control   -OOB as tolerated with assistance   -lantus qhs for BG control    Dispo: home with VNS for IV abx, won't start until Friday, needs to stay until set up    [facility]  [id number] Falls frequently

## 2018-12-06 NOTE — ED PROVIDER NOTE - DR. NAME
Progress Notes by Garrison Olivas 
Dr. Luque

## 2019-02-10 ENCOUNTER — INPATIENT (INPATIENT)
Facility: HOSPITAL | Age: 84
LOS: 2 days | Discharge: HOME | End: 2019-02-13
Attending: INTERNAL MEDICINE | Admitting: INTERNAL MEDICINE
Payer: MEDICARE

## 2019-02-10 VITALS
TEMPERATURE: 97 F | WEIGHT: 102.07 LBS | DIASTOLIC BLOOD PRESSURE: 67 MMHG | OXYGEN SATURATION: 99 % | RESPIRATION RATE: 18 BRPM | SYSTOLIC BLOOD PRESSURE: 96 MMHG | HEART RATE: 62 BPM | HEIGHT: 65 IN

## 2019-02-10 DIAGNOSIS — Z95.5 PRESENCE OF CORONARY ANGIOPLASTY IMPLANT AND GRAFT: Chronic | ICD-10-CM

## 2019-02-10 DIAGNOSIS — Z98.890 OTHER SPECIFIED POSTPROCEDURAL STATES: Chronic | ICD-10-CM

## 2019-02-10 DIAGNOSIS — Z90.721 ACQUIRED ABSENCE OF OVARIES, UNILATERAL: Chronic | ICD-10-CM

## 2019-02-10 PROBLEM — E78.5 HYPERLIPIDEMIA, UNSPECIFIED: Chronic | Status: ACTIVE | Noted: 2018-04-19

## 2019-02-10 PROBLEM — I10 ESSENTIAL (PRIMARY) HYPERTENSION: Chronic | Status: ACTIVE | Noted: 2018-04-19

## 2019-02-10 LAB
ALBUMIN SERPL ELPH-MCNC: 2.7 G/DL — LOW (ref 3.3–5)
ALP SERPL-CCNC: 52 U/L — SIGNIFICANT CHANGE UP (ref 40–120)
ALT FLD-CCNC: 13 U/L — SIGNIFICANT CHANGE UP (ref 12–78)
ANION GAP SERPL CALC-SCNC: 6 MMOL/L — SIGNIFICANT CHANGE UP (ref 5–17)
ANION GAP SERPL CALC-SCNC: 9 MMOL/L — SIGNIFICANT CHANGE UP (ref 5–17)
APTT BLD: 19.6 SEC — LOW (ref 27.5–36.3)
AST SERPL-CCNC: 22 U/L — SIGNIFICANT CHANGE UP (ref 15–37)
BASOPHILS # BLD AUTO: 0.06 K/UL — SIGNIFICANT CHANGE UP (ref 0–0.2)
BASOPHILS NFR BLD AUTO: 0.4 % — SIGNIFICANT CHANGE UP (ref 0–2)
BILIRUB SERPL-MCNC: 0.5 MG/DL — SIGNIFICANT CHANGE UP (ref 0.2–1.2)
BLD GP AB SCN SERPL QL: SIGNIFICANT CHANGE UP
BUN SERPL-MCNC: 25 MG/DL — HIGH (ref 7–23)
BUN SERPL-MCNC: 28 MG/DL — HIGH (ref 7–23)
CALCIUM SERPL-MCNC: 6.9 MG/DL — LOW (ref 8.5–10.1)
CALCIUM SERPL-MCNC: 7.4 MG/DL — LOW (ref 8.5–10.1)
CHLORIDE SERPL-SCNC: 100 MMOL/L — SIGNIFICANT CHANGE UP (ref 96–108)
CHLORIDE SERPL-SCNC: 103 MMOL/L — SIGNIFICANT CHANGE UP (ref 96–108)
CO2 SERPL-SCNC: 28 MMOL/L — SIGNIFICANT CHANGE UP (ref 22–31)
CO2 SERPL-SCNC: 32 MMOL/L — HIGH (ref 22–31)
CREAT SERPL-MCNC: 3.25 MG/DL — HIGH (ref 0.5–1.3)
CREAT SERPL-MCNC: 3.35 MG/DL — HIGH (ref 0.5–1.3)
EOSINOPHIL # BLD AUTO: 0 K/UL — SIGNIFICANT CHANGE UP (ref 0–0.5)
EOSINOPHIL NFR BLD AUTO: 0 % — SIGNIFICANT CHANGE UP (ref 0–6)
GLUCOSE SERPL-MCNC: 109 MG/DL — HIGH (ref 70–99)
GLUCOSE SERPL-MCNC: 99 MG/DL — SIGNIFICANT CHANGE UP (ref 70–99)
HCT VFR BLD CALC: 30.2 % — LOW (ref 34.5–45)
HCT VFR BLD CALC: 42.6 % — SIGNIFICANT CHANGE UP (ref 34.5–45)
HGB BLD-MCNC: 13.5 G/DL — SIGNIFICANT CHANGE UP (ref 11.5–15.5)
HGB BLD-MCNC: 9.6 G/DL — LOW (ref 11.5–15.5)
IMM GRANULOCYTES NFR BLD AUTO: 0.7 % — SIGNIFICANT CHANGE UP (ref 0–1.5)
INR BLD: 0.96 RATIO — SIGNIFICANT CHANGE UP (ref 0.88–1.16)
LYMPHOCYTES # BLD AUTO: 1.14 K/UL — SIGNIFICANT CHANGE UP (ref 1–3.3)
LYMPHOCYTES # BLD AUTO: 7.5 % — LOW (ref 13–44)
MCHC RBC-ENTMCNC: 30 PG — SIGNIFICANT CHANGE UP (ref 27–34)
MCHC RBC-ENTMCNC: 30.2 PG — SIGNIFICANT CHANGE UP (ref 27–34)
MCHC RBC-ENTMCNC: 31.7 GM/DL — LOW (ref 32–36)
MCHC RBC-ENTMCNC: 31.8 GM/DL — LOW (ref 32–36)
MCV RBC AUTO: 94.7 FL — SIGNIFICANT CHANGE UP (ref 80–100)
MCV RBC AUTO: 95 FL — SIGNIFICANT CHANGE UP (ref 80–100)
MONOCYTES # BLD AUTO: 1.45 K/UL — HIGH (ref 0–0.9)
MONOCYTES NFR BLD AUTO: 9.5 % — SIGNIFICANT CHANGE UP (ref 2–14)
NEUTROPHILS # BLD AUTO: 12.55 K/UL — HIGH (ref 1.8–7.4)
NEUTROPHILS NFR BLD AUTO: 81.9 % — HIGH (ref 43–77)
NRBC # BLD: 0 /100 WBCS — SIGNIFICANT CHANGE UP (ref 0–0)
NRBC # BLD: 0 /100 WBCS — SIGNIFICANT CHANGE UP (ref 0–0)
PLATELET # BLD AUTO: 170 K/UL — SIGNIFICANT CHANGE UP (ref 150–400)
PLATELET # BLD AUTO: 226 K/UL — SIGNIFICANT CHANGE UP (ref 150–400)
POTASSIUM SERPL-MCNC: 4 MMOL/L — SIGNIFICANT CHANGE UP (ref 3.5–5.3)
POTASSIUM SERPL-MCNC: 4.1 MMOL/L — SIGNIFICANT CHANGE UP (ref 3.5–5.3)
POTASSIUM SERPL-SCNC: 4 MMOL/L — SIGNIFICANT CHANGE UP (ref 3.5–5.3)
POTASSIUM SERPL-SCNC: 4.1 MMOL/L — SIGNIFICANT CHANGE UP (ref 3.5–5.3)
PROT SERPL-MCNC: 5.2 GM/DL — LOW (ref 6–8.3)
PROTHROM AB SERPL-ACNC: 10.6 SEC — SIGNIFICANT CHANGE UP (ref 10–12.9)
RBC # BLD: 3.18 M/UL — LOW (ref 3.8–5.2)
RBC # BLD: 4.5 M/UL — SIGNIFICANT CHANGE UP (ref 3.8–5.2)
RBC # FLD: 13.8 % — SIGNIFICANT CHANGE UP (ref 10.3–14.5)
RBC # FLD: 14 % — SIGNIFICANT CHANGE UP (ref 10.3–14.5)
SODIUM SERPL-SCNC: 137 MMOL/L — SIGNIFICANT CHANGE UP (ref 135–145)
SODIUM SERPL-SCNC: 141 MMOL/L — SIGNIFICANT CHANGE UP (ref 135–145)
TYPE + AB SCN PNL BLD: SIGNIFICANT CHANGE UP
WBC # BLD: 11.36 K/UL — HIGH (ref 3.8–10.5)
WBC # BLD: 15.3 K/UL — HIGH (ref 3.8–10.5)
WBC # FLD AUTO: 11.36 K/UL — HIGH (ref 3.8–10.5)
WBC # FLD AUTO: 15.3 K/UL — HIGH (ref 3.8–10.5)

## 2019-02-10 PROCEDURE — 93010 ELECTROCARDIOGRAM REPORT: CPT

## 2019-02-10 PROCEDURE — 99285 EMERGENCY DEPT VISIT HI MDM: CPT

## 2019-02-10 PROCEDURE — 71045 X-RAY EXAM CHEST 1 VIEW: CPT | Mod: 26

## 2019-02-10 PROCEDURE — 72125 CT NECK SPINE W/O DYE: CPT | Mod: 26

## 2019-02-10 PROCEDURE — 73502 X-RAY EXAM HIP UNI 2-3 VIEWS: CPT | Mod: 26,LT

## 2019-02-10 PROCEDURE — 70450 CT HEAD/BRAIN W/O DYE: CPT | Mod: 26

## 2019-02-10 PROCEDURE — 73552 X-RAY EXAM OF FEMUR 2/>: CPT | Mod: 26,LT

## 2019-02-10 RX ORDER — POLYETHYLENE GLYCOL 3350 17 G/17G
17 POWDER, FOR SOLUTION ORAL DAILY
Qty: 0 | Refills: 0 | Status: DISCONTINUED | OUTPATIENT
Start: 2019-02-10 | End: 2019-02-13

## 2019-02-10 RX ORDER — FENTANYL CITRATE 50 UG/ML
25 INJECTION INTRAVENOUS
Qty: 0 | Refills: 0 | Status: DISCONTINUED | OUTPATIENT
Start: 2019-02-10 | End: 2019-02-10

## 2019-02-10 RX ORDER — ASPIRIN/CALCIUM CARB/MAGNESIUM 324 MG
1 TABLET ORAL
Qty: 0 | Refills: 0 | COMMUNITY

## 2019-02-10 RX ORDER — TRAZODONE HCL 50 MG
25 TABLET ORAL AT BEDTIME
Qty: 0 | Refills: 0 | Status: DISCONTINUED | OUTPATIENT
Start: 2019-02-10 | End: 2019-02-13

## 2019-02-10 RX ORDER — SODIUM CHLORIDE 9 MG/ML
500 INJECTION INTRAMUSCULAR; INTRAVENOUS; SUBCUTANEOUS ONCE
Qty: 0 | Refills: 0 | Status: COMPLETED | OUTPATIENT
Start: 2019-02-10 | End: 2019-02-10

## 2019-02-10 RX ORDER — ASPIRIN/CALCIUM CARB/MAGNESIUM 324 MG
81 TABLET ORAL DAILY
Qty: 0 | Refills: 0 | Status: DISCONTINUED | OUTPATIENT
Start: 2019-02-10 | End: 2019-02-13

## 2019-02-10 RX ORDER — HYDROMORPHONE HYDROCHLORIDE 2 MG/ML
0.5 INJECTION INTRAMUSCULAR; INTRAVENOUS; SUBCUTANEOUS ONCE
Qty: 0 | Refills: 0 | Status: DISCONTINUED | OUTPATIENT
Start: 2019-02-10 | End: 2019-02-10

## 2019-02-10 RX ORDER — OXYCODONE HYDROCHLORIDE 5 MG/1
2.5 TABLET ORAL EVERY 4 HOURS
Qty: 0 | Refills: 0 | Status: DISCONTINUED | OUTPATIENT
Start: 2019-02-10 | End: 2019-02-13

## 2019-02-10 RX ORDER — OXYCODONE HYDROCHLORIDE 5 MG/1
5 TABLET ORAL ONCE
Qty: 0 | Refills: 0 | Status: DISCONTINUED | OUTPATIENT
Start: 2019-02-10 | End: 2019-02-10

## 2019-02-10 RX ORDER — ATORVASTATIN CALCIUM 80 MG/1
10 TABLET, FILM COATED ORAL AT BEDTIME
Qty: 0 | Refills: 0 | Status: DISCONTINUED | OUTPATIENT
Start: 2019-02-10 | End: 2019-02-13

## 2019-02-10 RX ORDER — LANOLIN ALCOHOL/MO/W.PET/CERES
3 CREAM (GRAM) TOPICAL AT BEDTIME
Qty: 0 | Refills: 0 | Status: DISCONTINUED | OUTPATIENT
Start: 2019-02-10 | End: 2019-02-13

## 2019-02-10 RX ORDER — SODIUM CHLORIDE 9 MG/ML
1000 INJECTION, SOLUTION INTRAVENOUS
Qty: 0 | Refills: 0 | Status: DISCONTINUED | OUTPATIENT
Start: 2019-02-10 | End: 2019-02-10

## 2019-02-10 RX ORDER — HYDROMORPHONE HYDROCHLORIDE 2 MG/ML
0.5 INJECTION INTRAMUSCULAR; INTRAVENOUS; SUBCUTANEOUS EVERY 4 HOURS
Qty: 0 | Refills: 0 | Status: DISCONTINUED | OUTPATIENT
Start: 2019-02-10 | End: 2019-02-13

## 2019-02-10 RX ORDER — AMLODIPINE BESYLATE 2.5 MG/1
5 TABLET ORAL DAILY
Qty: 0 | Refills: 0 | Status: DISCONTINUED | OUTPATIENT
Start: 2019-02-10 | End: 2019-02-10

## 2019-02-10 RX ORDER — SENNA PLUS 8.6 MG/1
2 TABLET ORAL AT BEDTIME
Qty: 0 | Refills: 0 | Status: DISCONTINUED | OUTPATIENT
Start: 2019-02-10 | End: 2019-02-13

## 2019-02-10 RX ORDER — DOCUSATE SODIUM 100 MG
100 CAPSULE ORAL THREE TIMES A DAY
Qty: 0 | Refills: 0 | Status: DISCONTINUED | OUTPATIENT
Start: 2019-02-10 | End: 2019-02-13

## 2019-02-10 RX ORDER — SODIUM CHLORIDE 9 MG/ML
1000 INJECTION INTRAMUSCULAR; INTRAVENOUS; SUBCUTANEOUS
Qty: 0 | Refills: 0 | Status: DISCONTINUED | OUTPATIENT
Start: 2019-02-10 | End: 2019-02-10

## 2019-02-10 RX ORDER — ACETAMINOPHEN 500 MG
1000 TABLET ORAL ONCE
Qty: 0 | Refills: 0 | Status: DISCONTINUED | OUTPATIENT
Start: 2019-02-10 | End: 2019-02-10

## 2019-02-10 RX ORDER — OXYCODONE HYDROCHLORIDE 5 MG/1
5 TABLET ORAL EVERY 4 HOURS
Qty: 0 | Refills: 0 | Status: DISCONTINUED | OUTPATIENT
Start: 2019-02-10 | End: 2019-02-13

## 2019-02-10 RX ORDER — ONDANSETRON 8 MG/1
4 TABLET, FILM COATED ORAL ONCE
Qty: 0 | Refills: 0 | Status: COMPLETED | OUTPATIENT
Start: 2019-02-10 | End: 2019-02-10

## 2019-02-10 RX ORDER — SODIUM BICARBONATE 1 MEQ/ML
0 SYRINGE (ML) INTRAVENOUS
Qty: 0 | Refills: 0 | COMMUNITY

## 2019-02-10 RX ORDER — CEFAZOLIN SODIUM 1 G
2000 VIAL (EA) INJECTION EVERY 8 HOURS
Qty: 0 | Refills: 0 | Status: COMPLETED | OUTPATIENT
Start: 2019-02-11 | End: 2019-02-11

## 2019-02-10 RX ORDER — METOPROLOL TARTRATE 50 MG
100 TABLET ORAL DAILY
Qty: 0 | Refills: 0 | Status: DISCONTINUED | OUTPATIENT
Start: 2019-02-10 | End: 2019-02-13

## 2019-02-10 RX ORDER — ONDANSETRON 8 MG/1
4 TABLET, FILM COATED ORAL ONCE
Qty: 0 | Refills: 0 | Status: DISCONTINUED | OUTPATIENT
Start: 2019-02-10 | End: 2019-02-10

## 2019-02-10 RX ORDER — AMLODIPINE BESYLATE 2.5 MG/1
1 TABLET ORAL
Qty: 0 | Refills: 0 | COMMUNITY

## 2019-02-10 RX ORDER — CLOPIDOGREL BISULFATE 75 MG/1
75 TABLET, FILM COATED ORAL DAILY
Qty: 0 | Refills: 0 | Status: DISCONTINUED | OUTPATIENT
Start: 2019-02-11 | End: 2019-02-13

## 2019-02-10 RX ORDER — MAGNESIUM HYDROXIDE 400 MG/1
30 TABLET, CHEWABLE ORAL DAILY
Qty: 0 | Refills: 0 | Status: DISCONTINUED | OUTPATIENT
Start: 2019-02-10 | End: 2019-02-13

## 2019-02-10 RX ORDER — ACETAMINOPHEN 500 MG
650 TABLET ORAL EVERY 8 HOURS
Qty: 0 | Refills: 0 | Status: DISCONTINUED | OUTPATIENT
Start: 2019-02-10 | End: 2019-02-13

## 2019-02-10 RX ORDER — ONDANSETRON 8 MG/1
4 TABLET, FILM COATED ORAL EVERY 6 HOURS
Qty: 0 | Refills: 0 | Status: DISCONTINUED | OUTPATIENT
Start: 2019-02-10 | End: 2019-02-13

## 2019-02-10 RX ORDER — ACETAMINOPHEN 500 MG
650 TABLET ORAL EVERY 6 HOURS
Qty: 0 | Refills: 0 | Status: DISCONTINUED | OUTPATIENT
Start: 2019-02-10 | End: 2019-02-13

## 2019-02-10 RX ADMIN — HYDROMORPHONE HYDROCHLORIDE 0.5 MILLIGRAM(S): 2 INJECTION INTRAMUSCULAR; INTRAVENOUS; SUBCUTANEOUS at 08:29

## 2019-02-10 RX ADMIN — Medication 100 MILLIGRAM(S): at 22:06

## 2019-02-10 RX ADMIN — ATORVASTATIN CALCIUM 10 MILLIGRAM(S): 80 TABLET, FILM COATED ORAL at 22:06

## 2019-02-10 RX ADMIN — Medication 81 MILLIGRAM(S): at 12:12

## 2019-02-10 RX ADMIN — Medication 100 MILLIGRAM(S): at 12:12

## 2019-02-10 RX ADMIN — SODIUM CHLORIDE 500 MILLILITER(S): 9 INJECTION INTRAMUSCULAR; INTRAVENOUS; SUBCUTANEOUS at 09:36

## 2019-02-10 RX ADMIN — SENNA PLUS 2 TABLET(S): 8.6 TABLET ORAL at 22:06

## 2019-02-10 RX ADMIN — AMLODIPINE BESYLATE 5 MILLIGRAM(S): 2.5 TABLET ORAL at 12:12

## 2019-02-10 RX ADMIN — HYDROMORPHONE HYDROCHLORIDE 0.5 MILLIGRAM(S): 2 INJECTION INTRAMUSCULAR; INTRAVENOUS; SUBCUTANEOUS at 08:42

## 2019-02-10 RX ADMIN — ONDANSETRON 4 MILLIGRAM(S): 8 TABLET, FILM COATED ORAL at 09:32

## 2019-02-10 NOTE — PHARMACOTHERAPY INTERVENTION NOTE - COMMENTS
Completed med history with sonAlverto and reviewed with dr john. Patient only takes morning dose of amlodipine on days of dialysis (2.5 mg, 1 tablet in the AM on Tuesday, Thursday and Saturday) and takes 1 tablet in the PM everyday.

## 2019-02-10 NOTE — H&P ADULT - NSHPLABSRESULTS_GEN_ALL_CORE
13.5   15.30 )-----------( 226      ( 10 Feb 2019 08:34 )             42.6       CBC Full  -  ( 10 Feb 2019 08:34 )  WBC Count : 15.30 K/uL  Hemoglobin : 13.5 g/dL  Hematocrit : 42.6 %  Platelet Count - Automated : 226 K/uL  Mean Cell Volume : 94.7 fl  Mean Cell Hemoglobin : 30.0 pg  Mean Cell Hemoglobin Concentration : 31.7 gm/dL  Auto Neutrophil # : 12.55 K/uL  Auto Lymphocyte # : 1.14 K/uL  Auto Monocyte # : 1.45 K/uL  Auto Eosinophil # : 0.00 K/uL  Auto Basophil # : 0.06 K/uL  Auto Neutrophil % : 81.9 %  Auto Lymphocyte % : 7.5 %  Auto Monocyte % : 9.5 %  Auto Eosinophil % : 0.0 %  Auto Basophil % : 0.4 %              PT/INR - ( 10 Feb 2019 08:34 )   PT: 10.6 sec;   INR: 0.96 ratio         PTT - ( 10 Feb 2019 08:34 )  PTT:19.6 sec

## 2019-02-10 NOTE — ED PROVIDER NOTE - CARE PLAN
Principal Discharge DX:	Other closed fracture of left femur, unspecified portion of femur, initial encounter Principal Discharge DX:	Closed fracture of left hip, initial encounter

## 2019-02-10 NOTE — CONSULT NOTE ADULT - SUBJECTIVE AND OBJECTIVE BOX
Chief complaints.  Fell at home.    HPI:  89 yo woman with ESRD on maintenance HD TIW since 5/2018.   Pt has been tolerating tx well with stable clinical status and with no acute issues.  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  Change her 's diaper a 2:00 AM and fell while walking back to bed.  Cant explain what happened.  Does not recall whether she felt lightheaded or dizzy.  In ED, found to have Left Intertrochanteric fracture.  Post Hip repair earlier today.  Feels tired.  Denies SOB.      PMHX and PSHX.  1.HTN  2.CAD (stent x1 in 2007)  3.Previous hx of syncope    FAMILY HISTORY:  N/C    SOCIAL HISTORY :  Lives at home with her .  No hx of smoking or ETOH.  Allergies    sulfa drugs (Unknown)    REVIEW OF SYSTEMS :  Denies HA  Denies dizziness  Denies SOB  Denies abdominal pain.  Feeling discouraged.      MEDICATIONS  (STANDING):  aspirin enteric coated 81 milliGRAM(s) Oral daily  atorvastatin 10 milliGRAM(s) Oral at bedtime  calcium carbonate 1250 mG  + Vitamin D (OsCal 500 + D) 1 Tablet(s) Oral daily  docusate sodium 100 milliGRAM(s) Oral three times a day  metoprolol succinate  milliGRAM(s) Oral daily  polyethylene glycol 3350 17 Gram(s) Oral daily  senna 2 Tablet(s) Oral at bedtime    MEDICATIONS  (PRN):  acetaminophen   Tablet .. 650 milliGRAM(s) Oral every 8 hours PRN Mild Pain (1 - 3)  acetaminophen   Tablet .. 650 milliGRAM(s) Oral every 6 hours PRN Temp greater or equal to 38.5C (101.3F), Moderate Pain (4 - 6)  HYDROmorphone  Injectable 0.5 milliGRAM(s) IV Push every 4 hours PRN Severe Pain (7 - 10)  magnesium hydroxide Suspension 30 milliLiter(s) Oral daily PRN Constipation  melatonin 3 milliGRAM(s) Oral at bedtime PRN Insomnia  ondansetron Injectable 4 milliGRAM(s) IV Push every 6 hours PRN Nausea and/or Vomiting  oxyCODONE    IR 2.5 milliGRAM(s) Oral every 4 hours PRN Moderate Pain (4 - 6)  oxyCODONE    IR 5 milliGRAM(s) Oral every 4 hours PRN Severe Pain (7 - 10)  traZODone 25 milliGRAM(s) Oral at bedtime PRN insomnia         Vital Signs Last 24 Hrs  T(C): 36.3 (10 Feb 2019 19:15), Max: 36.7 (10 Feb 2019 12:08)  T(F): 97.3 (10 Feb 2019 19:15), Max: 98.1 (10 Feb 2019 12:08)  HR: 63 (10 Feb 2019 19:15) (62 - 75)  BP: 96/42 (10 Feb 2019 19:15) (96/42 - 126/63)  BP(mean): --  RR: 18 (10 Feb 2019 19:15) (16 - 20)  SpO2: 99% (10 Feb 2019 19:15) (98% - 100%)  Daily Height in cm: 165.1 (10 Feb 2019 08:17)    Daily   I&O's Summary    10 Feb 2019 07:01  -  10 Feb 2019 20:33  --------------------------------------------------------  IN: 1025 mL / OUT: 50 mL / NET: 975 mL        PHYSICAL EXAM:  Alert and appropriate  GEN: no distress  HEENT: WNL  NECK : supple  CV: S1S2 RRR  LUNGS: Clear to aus  ABD: soft  EXT: no edema    LABS:                        9.6    11.36 )-----------( 170      ( 10 Feb 2019 18:43 )             30.2     02-10    137  |  100  |  28<H>  ----------------------------<  99  4.1   |  28  |  3.35<H>    Ca    6.9<L>      10 Feb 2019 18:43    TPro  5.2<L>  /  Alb  2.7<L>  /  TBili  0.5  /  DBili  x   /  AST  22  /  ALT  13  /  AlkPhos  52  02-10    PT/INR - ( 10 Feb 2019 08:34 )   PT: 10.6 sec;   INR: 0.96 ratio         PTT - ( 10 Feb 2019 08:34 )  PTT:19.6 sec

## 2019-02-10 NOTE — ED ADULT NURSE NOTE - NSIMPLEMENTINTERV_GEN_ALL_ED
Implemented All Fall with Harm Risk Interventions:  [place] to call system. Call bell, personal items and telephone within reach. Instruct patient to call for assistance. Room bathroom lighting operational. Non-slip footwear when patient is off stretcher. Physically safe environment: no spills, clutter or unnecessary equipment. Stretcher in lowest position, wheels locked, appropriate side rails in place. Provide visual cue, wrist band, yellow gown, etc. Monitor gait and stability. Monitor for mental status changes and reorient to person, place, and time. Review medications for side effects contributing to fall risk. Reinforce activity limits and safety measures with patient and family. Provide visual clues: red socks.

## 2019-02-10 NOTE — ED PROVIDER NOTE - OBJECTIVE STATEMENT
89F hx ESRD trip and fall at home c/o left leg pain, deformity.  No LOC. Denies striking her head but does c/o of some neck pain. No dizziness, syncope. No dyspnea. No other complaints.

## 2019-02-10 NOTE — ED ADULT NURSE NOTE - OBJECTIVE STATEMENT
Pt states she was getting up to help her  slipped and fell on hardwood floor. Pt has outward rotation of left leg.

## 2019-02-10 NOTE — ED ADULT TRIAGE NOTE - CHIEF COMPLAINT QUOTE
fall in the middle of the night, unknown reason, pt states she was up taking care of her ailing  and thinks she tripped. landing on left hip, deformity to left leg noted. hx of ESRD on dialysis, last treatment yesterday (saturday), on plavix, asa, trauma alert initiated, neg head injury apparent.

## 2019-02-10 NOTE — H&P ADULT - HISTORY OF PRESENT ILLNESS
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  No CP or SOB or palpitations.  On my exam, slightly hypertensive, NAD, awake and alert.  + L hip pain.  She gets HD T, Thurs and Sat.  Seen by ortho.  Dr ceballos service called--covered by dr lazo.  ECG--NSR L axis.  Admission CXR--no infiltrates/cardiomegaly     MOLST FORM COMPLETED--DNI/DNR

## 2019-02-10 NOTE — CONSULT NOTE ADULT - SUBJECTIVE AND OBJECTIVE BOX
Paged [phone], Returned call [phone]  To see patient 0912, Patient in CT, Patient seen after CT 0920    89y Female community ambulatory presents c/o L hip pain and inability to ambulate sp mechanical fall. Patient was up late last night caring for her  who can't walk when she fell on the side of the bed. Patient wasn't able to get to phone and call for help until aid and son were trying to get in this morning and found her on the ground. Brought in by ambulance. Denies HS/LOC. Denies numbness/tingling. Denies fever/chills. Denies pain/injury elsewhere.   Patients PCP is Dr. Campos  Nephrologist is Dr. Alford, Patient has CKD and is on dialysis Tuesday/Thursday/Saturday. She does report having Dialysis yesterday.    PAST MEDICAL & SURGICAL HISTORY:  Stented coronary artery  HLD (hyperlipidemia)  HTN (hypertension)  CKD (chronic kidney disease)  CAD (coronary artery disease)  Stented coronary artery: unable to recall date  H/O abdominal surgery: for a colon cyst in the 1940&#x27;s  H/O oophorectomy    MEDICATIONS  (STANDING):  See Med Rec    Allergies  sulfa drugs (Unknown)                       13.5   15.30 )-----------( 226      ( 10 Feb 2019 08:34 )             42.6     PT/INR - ( 10 Feb 2019 08:34 )   PT: 10.6 sec;   INR: 0.96 ratio         PTT - ( 10 Feb 2019 08:34 )  PTT:19.6 sec  Vital Signs Last 24 Hrs  T(C): 36.3 (02-10-19 @ 08:17), Max: 36.3 (02-10-19 @ 08:17)  T(F): 97.3 (02-10-19 @ 08:17), Max: 97.3 (02-10-19 @ 08:17)  HR: 62 (02-10-19 @ 08:17) (62 - 62)  BP: 96/67 (02-10-19 @ 08:17) (96/67 - 96/67)  BP(mean): --  RR: 18 (02-10-19 @ 08:17) (18 - 18)  SpO2: 99% (02-10-19 @ 08:17) (99% - 99%)    Imaging: XR demonstates Left intertrochanteric hip fracture    Physical Exam  Gen: NAD  LLE: skin intact, unable to SLR LLE, + log roll LLE, +ttp hip/groin, no ttp elsewhere, +ehl/fhl/ta/gs function, no calf ttp, dp/pt pulse intact, compartments soft    Secondary survey: benign, nv intact, able to SLR contralateral leg, negative log roll contralateral leg, no bony ttp elsewhere    A/P: 89y Female with L hip fracture  Pain control  NWB LLE, bedrest  FU labs/imaging  Ca/Vit D  Outpt osteoporosis workup  Admit to medical team  Medical clearance/optimization for OR  Will consult Renal team to determine if patient needs dialysis prior to OR  Will discuss with attending, Dr. Mcclure at change plan as needed Paged [phone], Returned call [phone]  To see patient 0912, Patient in CT, Patient seen after CT 0920    89y Female community ambulatory presents c/o L hip pain and inability to ambulate sp mechanical fall. Patient was up late last night caring for her  who can't walk when she fell on the side of the bed. Patient wasn't able to get to phone and call for help until aid and son were trying to get in this morning and found her on the ground. Brought in by ambulance. Denies HS/LOC. Denies numbness/tingling. Denies fever/chills. Denies pain/injury elsewhere.   Patients PCP is Dr. Campos  Nephrologist is Dr. Alford, Patient has CKD and is on dialysis Tuesday/Thursday/Saturday. She does report having Dialysis yesterday.    PAST MEDICAL & SURGICAL HISTORY:  Stented coronary artery  HLD (hyperlipidemia)  HTN (hypertension)  CKD (chronic kidney disease)  CAD (coronary artery disease)  Stented coronary artery: unable to recall date  H/O abdominal surgery: for a colon cyst in the 1940&#x27;s  H/O oophorectomy    MEDICATIONS  (STANDING):  See Med Rec    Allergies  sulfa drugs (Unknown)                       13.5   15.30 )-----------( 226      ( 10 Feb 2019 08:34 )             42.6     PT/INR - ( 10 Feb 2019 08:34 )   PT: 10.6 sec;   INR: 0.96 ratio         PTT - ( 10 Feb 2019 08:34 )  PTT:19.6 sec  Vital Signs Last 24 Hrs  T(C): 36.3 (02-10-19 @ 08:17), Max: 36.3 (02-10-19 @ 08:17)  T(F): 97.3 (02-10-19 @ 08:17), Max: 97.3 (02-10-19 @ 08:17)  HR: 62 (02-10-19 @ 08:17) (62 - 62)  BP: 96/67 (02-10-19 @ 08:17) (96/67 - 96/67)  BP(mean): --  RR: 18 (02-10-19 @ 08:17) (18 - 18)  SpO2: 99% (02-10-19 @ 08:17) (99% - 99%)    Imaging: XR demonstates Left intertrochanteric hip fracture    Physical Exam  Gen: NAD  LLE: skin intact, unable to SLR LLE, + log roll LLE, +ttp hip/groin, no ttp elsewhere, +ehl/fhl/ta/gs function, no calf ttp, dp/pt pulse intact, compartments soft    Secondary survey: benign, nv intact, able to SLR contralateral leg, negative log roll contralateral leg, no bony ttp elsewhere    A/P: 89y Female with L hip fracture  Pain control  NWB LLE, bedrest  FU labs/imaging  Ca/Vit D  Outpt osteoporosis workup  Admit to medical team  Medical clearance/optimization for OR  NPO for possible OR later today  Hold Plavix/Chemical anticoagulation for OR today  Will consult Renal team to determine if patient needs dialysis prior to OR  Will discuss with attending, Dr. Mcclure at change plan as needed

## 2019-02-10 NOTE — H&P ADULT - ASSESSMENT
IMP:    89 y/o female with HTN, CAD s/p PCI (br dr ceballos), CKD 5 on RRT (dr alcala) and HL presents with L hip pain following mech fall last night found to have L hip Fx anticipating OR    DNI/DNR    Plan:    Admit to med surg    Will obtain cards eval for OR optimization--Dr ceballos service called    ESRD--follow up Chemistry (not available for review)--Dr alcala eval    L hip Fx--pain control--SCD for DVT prophy--Hold plavix and chemical DVT prophy    HTN--cont with Bbx--increase Norvasc to 5 daily    HL--Statin      Above d/w pt and son at bedside-- All concerns addressed

## 2019-02-10 NOTE — CONSULT NOTE ADULT - SUBJECTIVE AND OBJECTIVE BOX
Patient is a 89y old  Female who presents with a chief complaint of L hip pain (10 Feb 2019 11:11)      HPI:  89 y/o female with HTN, CAD s/p PCI distant past , 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  No CP or SOB or palpitations.  On my exam, slightly hypertensive, NAD, awake and alert.  + L hip pain.  She gets HD T, Thurs and Sat.  Seen by ortho.   ECG--NSR [facility]  Admission CXR--no infiltrates/cardiomegaly   Patient does not remeber fall . She denies recent CP or SOB on exertion . I have no record as to when her last cardiac evaluation was .   MOLST FORM COMPLETED--DNI/DNR (10 Feb 2019 11:11)      PAST MEDICAL & SURGICAL HISTORY:  Stented coronary artery  HLD (hyperlipidemia)  HTN (hypertension)  CKD (chronic kidney disease)  CAD (coronary artery disease) distant   Stented coronary artery: unable to recall date  H/O abdominal surgery: for a colon cyst in the 1940&#x27;s  H/O oophorectomy                                    MEDICATIONS  (STANDING):  amLODIPine   Tablet 5 milliGRAM(s) Oral daily  aspirin enteric coated 81 milliGRAM(s) Oral daily  atorvastatin 10 milliGRAM(s) Oral at bedtime  lactated ringers. 1000 milliLiter(s) (50 mL/Hr) IV Continuous <Continuous>  metoprolol succinate  milliGRAM(s) Oral daily    MEDICATIONS  (PRN):  acetaminophen   Tablet .. 650 milliGRAM(s) Oral every 6 hours PRN Temp greater or equal to 38.5C (101.3F), Moderate Pain (4 - 6)  HYDROmorphone  Injectable 0.5 milliGRAM(s) IV Push every 4 hours PRN Severe Pain (7 - 10)      FAMILY HISTORY:NC      SOCIAL HISTORY:nonsmoker    CIGARETTES:        Vital Signs Last 24 Hrs  T(C): 36.7 (10 Feb 2019 12:08), Max: 36.7 (10 Feb 2019 12:08)  T(F): 98.1 (10 Feb 2019 12:08), Max: 98.1 (10 Feb 2019 12:08)  HR: 64 (10 Feb 2019 12:08) (62 - 64)  BP: 126/63 (10 Feb 2019 12:08) (96/67 - 126/63)  BP(mean): --  RR: 20 (10 Feb 2019 12:08) (18 - 20)  SpO2: 99% (10 Feb 2019 12:08) (99% - 99%)            INTERPRETATION OF TELEMETRY:    ECG:    I&O's Detail    10 Feb 2019 07:01  -  10 Feb 2019 13:33  --------------------------------------------------------  IN:    Sodium Chloride 0.9% IV Bolus: 500 mL  Total IN: 500 mL    OUT:  Total OUT: 0 mL    Total NET: 500 mL          LABS:                        13.5   15.30 )-----------( 226      ( 10 Feb 2019 08:34 )             42.6     02-10    141  |  103  |  25<H>  ----------------------------<  109<H>  4.0   |  32<H>  |  3.25<H>    Ca    7.4<L>      10 Feb 2019 12:12    TPro  5.2<L>  /  Alb  2.7<L>  /  TBili  0.5  /  DBili  x   /  AST  22  /  ALT  13  /  AlkPhos  52  02-10        PT/INR - ( 10 Feb 2019 08:34 )   PT: 10.6 sec;   INR: 0.96 ratio         PTT - ( 10 Feb 2019 08:34 )  PTT:19.6 sec    I&O's Summary    10 Feb 2019 07:01  -  10 Feb 2019 13:33  --------------------------------------------------------  IN: 500 mL / OUT: 0 mL / NET: 500 mL      BNP  RADIOLOGY & ADDITIONAL STUDIES:

## 2019-02-10 NOTE — BRIEF OPERATIVE NOTE - PROCEDURE
<<-----Click on this checkbox to enter Procedure Intramedullary nail fixation of left femur  02/10/2019    Active  NFRANE

## 2019-02-10 NOTE — H&P ADULT - NSHPPHYSICALEXAM_GEN_ALL_CORE
Vital Signs Last 24 Hrs  T(C): 36.3 (10 Feb 2019 08:17), Max: 36.3 (10 Feb 2019 08:17)  T(F): 97.3 (10 Feb 2019 08:17), Max: 97.3 (10 Feb 2019 08:17)  HR: 62 (10 Feb 2019 08:17) (62 - 62)  BP: 96/67 (10 Feb 2019 08:17) (96/67 - 96/67)  BP(mean): --  RR: 18 (10 Feb 2019 08:17) (18 - 18)  SpO2: 99% (10 Feb 2019 08:17) (99% - 99%)

## 2019-02-11 ENCOUNTER — TRANSCRIPTION ENCOUNTER (OUTPATIENT)
Age: 84
End: 2019-02-11

## 2019-02-11 LAB
ALBUMIN SERPL ELPH-MCNC: 2.2 G/DL — LOW (ref 3.3–5)
ANION GAP SERPL CALC-SCNC: 9 MMOL/L — SIGNIFICANT CHANGE UP (ref 5–17)
BUN SERPL-MCNC: 35 MG/DL — HIGH (ref 7–23)
CALCIUM SERPL-MCNC: 7.1 MG/DL — LOW (ref 8.5–10.1)
CHLORIDE SERPL-SCNC: 105 MMOL/L — SIGNIFICANT CHANGE UP (ref 96–108)
CO2 SERPL-SCNC: 27 MMOL/L — SIGNIFICANT CHANGE UP (ref 22–31)
CREAT SERPL-MCNC: 4.22 MG/DL — HIGH (ref 0.5–1.3)
GLUCOSE SERPL-MCNC: 108 MG/DL — HIGH (ref 70–99)
HAV IGM SER-ACNC: SIGNIFICANT CHANGE UP
HBV CORE IGM SER-ACNC: SIGNIFICANT CHANGE UP
HBV SURFACE AG SER-ACNC: SIGNIFICANT CHANGE UP
HCT VFR BLD CALC: 27 % — LOW (ref 34.5–45)
HCV AB S/CO SERPL IA: 0.16 S/CO — SIGNIFICANT CHANGE UP
HCV AB SERPL-IMP: SIGNIFICANT CHANGE UP
HGB BLD-MCNC: 8.4 G/DL — LOW (ref 11.5–15.5)
MCHC RBC-ENTMCNC: 29.6 PG — SIGNIFICANT CHANGE UP (ref 27–34)
MCHC RBC-ENTMCNC: 31.1 GM/DL — LOW (ref 32–36)
MCV RBC AUTO: 95.1 FL — SIGNIFICANT CHANGE UP (ref 80–100)
NRBC # BLD: 0 /100 WBCS — SIGNIFICANT CHANGE UP (ref 0–0)
PHOSPHATE SERPL-MCNC: 5.5 MG/DL — HIGH (ref 2.5–4.5)
PLATELET # BLD AUTO: 191 K/UL — SIGNIFICANT CHANGE UP (ref 150–400)
POTASSIUM SERPL-MCNC: 4.9 MMOL/L — SIGNIFICANT CHANGE UP (ref 3.5–5.3)
POTASSIUM SERPL-SCNC: 4.9 MMOL/L — SIGNIFICANT CHANGE UP (ref 3.5–5.3)
RBC # BLD: 2.84 M/UL — LOW (ref 3.8–5.2)
RBC # FLD: 14.5 % — SIGNIFICANT CHANGE UP (ref 10.3–14.5)
SODIUM SERPL-SCNC: 141 MMOL/L — SIGNIFICANT CHANGE UP (ref 135–145)
WBC # BLD: 11.66 K/UL — HIGH (ref 3.8–10.5)
WBC # FLD AUTO: 11.66 K/UL — HIGH (ref 3.8–10.5)

## 2019-02-11 PROCEDURE — 99223 1ST HOSP IP/OBS HIGH 75: CPT

## 2019-02-11 RX ORDER — HEPARIN SODIUM 5000 [USP'U]/ML
5000 INJECTION INTRAVENOUS; SUBCUTANEOUS EVERY 12 HOURS
Qty: 0 | Refills: 0 | Status: DISCONTINUED | OUTPATIENT
Start: 2019-02-11 | End: 2019-02-13

## 2019-02-11 RX ADMIN — OXYCODONE HYDROCHLORIDE 5 MILLIGRAM(S): 5 TABLET ORAL at 10:09

## 2019-02-11 RX ADMIN — Medication 100 MILLIGRAM(S): at 05:18

## 2019-02-11 RX ADMIN — ATORVASTATIN CALCIUM 10 MILLIGRAM(S): 80 TABLET, FILM COATED ORAL at 21:52

## 2019-02-11 RX ADMIN — CLOPIDOGREL BISULFATE 75 MILLIGRAM(S): 75 TABLET, FILM COATED ORAL at 11:02

## 2019-02-11 RX ADMIN — Medication 100 MILLIGRAM(S): at 05:19

## 2019-02-11 RX ADMIN — HEPARIN SODIUM 5000 UNIT(S): 5000 INJECTION INTRAVENOUS; SUBCUTANEOUS at 13:38

## 2019-02-11 RX ADMIN — Medication 1 TABLET(S): at 11:02

## 2019-02-11 RX ADMIN — OXYCODONE HYDROCHLORIDE 5 MILLIGRAM(S): 5 TABLET ORAL at 05:20

## 2019-02-11 RX ADMIN — Medication 81 MILLIGRAM(S): at 11:02

## 2019-02-11 RX ADMIN — OXYCODONE HYDROCHLORIDE 5 MILLIGRAM(S): 5 TABLET ORAL at 06:38

## 2019-02-11 RX ADMIN — OXYCODONE HYDROCHLORIDE 5 MILLIGRAM(S): 5 TABLET ORAL at 09:39

## 2019-02-11 RX ADMIN — Medication 100 MILLIGRAM(S): at 13:37

## 2019-02-11 NOTE — DISCHARGE NOTE ADULT - HOSPITAL COURSE
Orthopedic Summary  H&P:  Pt is a 89y Female PAST MEDICAL & SURGICAL HISTORY:  Stented coronary artery  HLD (hyperlipidemia)  HTN (hypertension)  CKD (chronic kidney disease)  CAD (coronary artery disease)  Stented coronary artery: unable to recall date  H/O abdominal surgery: for a colon cyst in the 1940&#x27;s  H/O oophorectomy        Now s/p LEFT Hip IM Nail for fracture. Pt is afebrile with stable vital signs. Pain is controlled. Exam reveals intact EHL FHL TA GS, +DP. Dressing is clean and dry with a New Aquacel bandage on.    Hospital Course:  Patient presented to [facility] ED after a fall, found to have an intertrochanteric hip fracture, and admitted to the Medical Service. Pt was  medically/cardiac cleared prior to surgery. Prophylactic antibiotics were started before the procedure and continued for 24 hours. They were admitted after surgery to the orthopedic floor. Received 1u PRBCS for post op acute blood loss anemia POD 1.  There were no complications during the hospital stay. All home medications were continued.    Routine consults were obtained from the Anticoagulation Team for DVT/PE prophylaxis, from Physical Therapy for twice daily PT, and followed by Medicine for Co-management. Patient was placed on  anticoagulation.  Pertinent home medications were continued.  Daily labs were followed.      On POD 0 there were no major issues. Pt received PT daily, and a new Aquacel dressing was applied prior to discharge. The pt will likely need DC to Rehab for ongoing PT once evaluated and ok per Medicine.   The orthopedic Attending is aware and agrees. See addendum to DC summary per medical team below for any additional info or if any changes.

## 2019-02-11 NOTE — CONSULT NOTE ADULT - SUBJECTIVE AND OBJECTIVE BOX
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  Now on 2 north S/P Left Hip ORIF          Patient is a 89y old  Female who presents with a chief complaint of L hip pain (2019 07:26)      Consulted by Dr. Mcclure  for VTE prophylaxis, risk stratification, and anticoagulation management.    PAST MEDICAL & SURGICAL HISTORY:  Stented coronary artery  HLD (hyperlipidemia)  HTN (hypertension)  CKD (chronic kidney disease)  CAD (coronary artery disease)  Stented coronary artery: unable to recall date  H/O abdominal surgery: for a colon cyst in the 1940&#x27;s  H/O oophorectomy          CrCl: 7.9    Caprini VTE Risk Score: CAPRINI SCORE [CLOT]    AGE RELATED RISK FACTORS                                                       MOBILITY RELATED FACTORS  [ ] Age 41-60 years                                            (1 Point)                  [ ] Bed rest                                                        (1 Point)  [ ] Age: 61-74 years                                           (2 Points)                 [ ] Plaster cast                                                   (2 Points)  [ x] Age= 75 years                                              (3 Points)                 [ ] Bed bound for more than 72 hours                 (2 Points)    DISEASE RELATED RISK FACTORS                                               GENDER SPECIFIC FACTORS  [ ] Edema in the lower extremities                       (1 Point)                  [ ] Pregnancy                                                     (1 Point)  [ ] Varicose veins                                               (1 Point)                  [ ] Post-partum < 6 weeks                                   (1 Point)             [ ] BMI > 25 Kg/m2                                            (1 Point)                  [ ] Hormonal therapy  or oral contraception          (1 Point)                 [ ] Sepsis (in the previous month)                        (1 Point)                  [ ] History of pregnancy complications                 (1 point)  [ ] Pneumonia or serious lung disease                                               [ ] Unexplained or recurrent                     (1 Point)           (in the previous month)                               (1 Point)  [ ] Abnormal pulmonary function test                     (1 Point)                 SURGERY RELATED RISK FACTORS  [ ] Acute myocardial infarction                              (1 Point)                 [ ]  Section                                             (1 Point)  [ ] Congestive heart failure (in the previous month)  (1 Point)               [ ] Minor surgery                                                  (1 Point)   [ ] Inflammatory bowel disease                             (1 Point)                 [ ] Arthroscopic surgery                                        (2 Points)  [ ] Central venous access                                      (2 Points)                [ ] General surgery lasting more than 45 minutes   (2 Points)       [ ] Stroke (in the previous month)                          (5 Points)               [ ] Elective arthroplasty                                         (5 Points)            [ ] H/O malignancy ( present or previous)            ( 2 points)                                                                                                                                   HEMATOLOGY RELATED FACTORS                                                 TRAUMA RELATED RISK FACTORS  [ ] Prior episodes of VTE                                     (3 Points)                 [ x] Fracture of the hip, pelvis, or leg                       (5 Points)  [ ] Positive family history for VTE                         (3 Points)                 [ ] Acute spinal cord injury (in the previous month)  (5 Points)  [ ] Prothrombin 33942 A                                     (3 Points)                 [ ] Paralysis  (less than 1 month)                             (5 Points)  [ ] Factor V Leiden                                             (3 Points)                  [ ] Multiple Trauma within 1 month                        (5 Points)  [ ] Lupus anticoagulants                                     (3 Points)                                                           [ ] Anticardiolipin antibodies                               (3 Points)                                                       [ ] High homocysteine in the blood                      (3 Points)                                             [ ] Other congenital or acquired thrombophilia      (3 Points)                                                [ ] Heparin induced thrombocytopenia                  (3 Points)                                          Total Score [  8        ]      IMPROVE Bleeding Risk Score: #6    Falls Risk:   High (x  )  Mod (  )  Low (  )      FAMILY HISTORY:    Denies any personal or familial history of clotting or bleeding disorders.    Allergies    sulfa drugs (Unknown)    Intolerances        REVIEW OF SYSTEMS    (  )Fever	     (  )Constipation	(  )SOB				(  )Headache	(  )Dysuria  (  )Chills	     (  )Melena	(  )Dyspnea present on exertion	                    (  )Dizziness                    (  )Polyuria  (  )Nausea	     (  )Hematochezia	(  )Cough			                    (  )Syncope   	(  )Hematuria  (  )Vomiting    (  )Chest Pain	(  )Wheezing			(  )Weakness  (  )Diarrhea     (  )Palpitations	(  )Anorexia			( x )Myalgia    All  other review of systems negative: Yes    Vital Signs Last 24 Hrs  T(C): 36.2 (19 @ 10:52), Max: 36.7 (02-10-19 @ 12:08)  T(F): 97.2 (19 @ 10:52), Max: 98.1 (02-10-19 @ 12:08)  HR: 62 (19 @ 10:52) (62 - 75)  BP: 93/41 (19 @ 10:52) (93/41 - 126/63)  BP(mean): --  RR: 17 (19 @ 10:52) (16 - 20)  SpO2: 98% (19 @ 10:52) (98% - 100%)        PHYSICAL EXAM:    Constitutional: Appears Well    Neurological: A& O x 1    Skin: Warm    Respiratory and Thorax: normal effort; Breath sounds: normal; No rales/wheezing/rhonchi  	  Cardiovascular: S1, S2, regular, NMBR	    Gastrointestinal: BS + x 4Q, nontender	    Genitourinary:  Bladder nondistended, nontender    Musculoskeletal:   General Right:   no muscle/joint tenderness,   normal tone, no joint swelling,   ROM: full	    General Left:   + muscle/joint tenderness,   normal tone, no joint swelling,   ROM: limited    Hip:              Left: Dressing CDI;   Lower extrems:   Right: no calf tenderness              negative valeriano's sign               + pedal pulses    Left:   no calf tenderness              negative valeriano's sign               + pedal pulses                          8.4    11.66 )-----------( 191      ( 2019 05:54 )             27.0           141  |  105  |  35<H>  ----------------------------<  108<H>  4.9   |  27  |  4.22<H>    Ca    7.1<L>      2019 05:54  Phos  5.5         TPro  x   /  Alb  2.2<L>  /  TBili  x   /  DBili  x   /  AST  x   /  ALT  x   /  AlkPhos  x         PT/INR - ( 10 Feb 2019 08:34 )   PT: 10.6 sec;   INR: 0.96 ratio         PTT - ( 10 Feb 2019 08:34 )  PTT:19.6 sec		      CT head  FINDINGS:      HEMISPHERES:There is generalized volume loss with mild to moderate   atrophy. Chronic ischemic changes are scattered but with no acute   abnormality. The vessels are extensively calcified, inclusive of the   anterior and posterior circulation.  VENTRICLES:  Midline and normal in size.  POSTERIOR FOSSA:  Ischemic changes in the brainstem noted of   indeterminate age.  EXTRACEREBRAL SPACES:  No subdural or epidural collections are noted.  SKULL BASE AND CALVARIUM:  Appears intact.  No fracture or destructive   lesion is identified.  SINUSES AND MASTOIDS:  Clear.  MISCELLANEOUS:  No orbital or suprasellar abnormality noted.      IMPRESSION:    1)  chronic ischemic changes with mild to moderate atrophy. Extensive   atherosclerotic calcification of the intervertebral vasculature..  2)  no intracerebral hemorrhage or contusion is identified.      CT Spine    IMPRESSION:    Cervical CT negative for acute fracture with multilevel degenerative   changes.            		    MEDICATIONS  (STANDING):  aspirin enteric coated 81 milliGRAM(s) Oral daily  atorvastatin 10 milliGRAM(s) Oral at bedtime  calcium carbonate 1250 mG  + Vitamin D (OsCal 500 + D) 1 Tablet(s) Oral daily  ceFAZolin   IVPB 2000 milliGRAM(s) IV Intermittent every 8 hours  clopidogrel Tablet 75 milliGRAM(s) Oral daily  docusate sodium 100 milliGRAM(s) Oral three times a day  metoprolol succinate  milliGRAM(s) Oral daily  polyethylene glycol 3350 17 Gram(s) Oral daily  senna 2 Tablet(s) Oral at bedtime          DVT Prophylaxis:  LMWH                   (  )  Heparin SQ           (  )  Coumadin             (  )  Xarelto                  (  )  Eliquis                   (  )  Venodynes           (  )  Ambulation          (  )  UFH                       (  )  Contraindicated  (  ) HPI:  89 y/o female with HTN, CAD s/p PCI (by dr ceballos), 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  Now on 2 north S/P Left Hip ORIF          Patient is a 89y old  Female who presents with a chief complaint of L hip pain (2019 07:26)      Consulted by Dr. Mcclure  for VTE prophylaxis, risk stratification, and anticoagulation management.    PAST MEDICAL & SURGICAL HISTORY:  Stented coronary artery  HLD (hyperlipidemia)  HTN (hypertension)  CKD (chronic kidney disease)  CAD (coronary artery disease)  Stented coronary artery: unable to recall date  H/O abdominal surgery: for a colon cyst in the 1940&#x27;s  H/O oophorectomy        : seen at bedside with sons present.  Discussed need for anticoagulation and they are in agreement to use heparin subcut      CrCl: 7.9    Caprini VTE Risk Score: CAPRINI SCORE [CLOT]    AGE RELATED RISK FACTORS                                                       MOBILITY RELATED FACTORS  [ ] Age 41-60 years                                            (1 Point)                  [ ] Bed rest                                                        (1 Point)  [ ] Age: 61-74 years                                           (2 Points)                 [ ] Plaster cast                                                   (2 Points)  [ x] Age= 75 years                                              (3 Points)                 [ ] Bed bound for more than 72 hours                 (2 Points)    DISEASE RELATED RISK FACTORS                                               GENDER SPECIFIC FACTORS  [ ] Edema in the lower extremities                       (1 Point)                  [ ] Pregnancy                                                     (1 Point)  [ ] Varicose veins                                               (1 Point)                  [ ] Post-partum < 6 weeks                                   (1 Point)             [ ] BMI > 25 Kg/m2                                            (1 Point)                  [ ] Hormonal therapy  or oral contraception          (1 Point)                 [ ] Sepsis (in the previous month)                        (1 Point)                  [ ] History of pregnancy complications                 (1 point)  [ ] Pneumonia or serious lung disease                                               [ ] Unexplained or recurrent                     (1 Point)           (in the previous month)                               (1 Point)  [ ] Abnormal pulmonary function test                     (1 Point)                 SURGERY RELATED RISK FACTORS  [ ] Acute myocardial infarction                              (1 Point)                 [ ]  Section                                             (1 Point)  [ ] Congestive heart failure (in the previous month)  (1 Point)               [ ] Minor surgery                                                  (1 Point)   [ ] Inflammatory bowel disease                             (1 Point)                 [ ] Arthroscopic surgery                                        (2 Points)  [ ] Central venous access                                      (2 Points)                [ ] General surgery lasting more than 45 minutes   (2 Points)       [ ] Stroke (in the previous month)                          (5 Points)               [ ] Elective arthroplasty                                         (5 Points)            [ ] H/O malignancy ( present or previous)            ( 2 points)                                                                                                                                   HEMATOLOGY RELATED FACTORS                                                 TRAUMA RELATED RISK FACTORS  [ ] Prior episodes of VTE                                     (3 Points)                 [ x] Fracture of the hip, pelvis, or leg                       (5 Points)  [ ] Positive family history for VTE                         (3 Points)                 [ ] Acute spinal cord injury (in the previous month)  (5 Points)  [ ] Prothrombin 36286 A                                     (3 Points)                 [ ] Paralysis  (less than 1 month)                             (5 Points)  [ ] Factor V Leiden                                             (3 Points)                  [ ] Multiple Trauma within 1 month                        (5 Points)  [ ] Lupus anticoagulants                                     (3 Points)                                                           [ ] Anticardiolipin antibodies                               (3 Points)                                                       [ ] High homocysteine in the blood                      (3 Points)                                             [ ] Other congenital or acquired thrombophilia      (3 Points)                                                [ ] Heparin induced thrombocytopenia                  (3 Points)                                          Total Score [  8        ]      IMPROVE Bleeding Risk Score: #6    Falls Risk:   High (x  )  Mod (  )  Low (  )      FAMILY HISTORY:    Denies any personal or familial history of clotting or bleeding disorders.    Allergies    sulfa drugs (Unknown)    Intolerances        REVIEW OF SYSTEMS    (  )Fever	     (  )Constipation	(  )SOB				(  )Headache	(  )Dysuria  (  )Chills	     (  )Melena	(  )Dyspnea present on exertion	                    (  )Dizziness                    (  )Polyuria  (  )Nausea	     (  )Hematochezia	(  )Cough			                    (  )Syncope   	(  )Hematuria  (  )Vomiting    (  )Chest Pain	(  )Wheezing			(  )Weakness  (  )Diarrhea     (  )Palpitations	(  )Anorexia			( x )Myalgia    All  other review of systems negative: Yes    Vital Signs Last 24 Hrs  T(C): 36.2 (19 @ 10:52), Max: 36.7 (02-10-19 @ 12:08)  T(F): 97.2 (19 @ 10:52), Max: 98.1 (02-10-19 @ 12:08)  HR: 62 (19 @ 10:52) (62 - 75)  BP: 93/41 (19 @ 10:52) (93/41 - 126/63)  BP(mean): --  RR: 17 (19 @ 10:52) (16 - 20)  SpO2: 98% (19 @ 10:52) (98% - 100%)        PHYSICAL EXAM:    Constitutional: Appears Well    Neurological: A& O x  3    Skin: Warm    Respiratory and Thorax: normal effort; Breath sounds: normal; No rales/wheezing/rhonchi  	  Cardiovascular: S1, S2, regular, NMBR	    Gastrointestinal: BS + x 4Q, nontender	    Genitourinary:  Bladder nondistended, nontender    Musculoskeletal:   General Right:   no muscle/joint tenderness,   normal tone, no joint swelling,   ROM: full	    General Left:   + muscle/joint tenderness,   normal tone, no joint swelling,   ROM: limited    Hip:              Left: Dressing CDI;   Lower extrems:   Right: no calf tenderness              negative valeriano's sign               + pedal pulses    Left:   no calf tenderness              negative valeriano's sign               + pedal pulses                          8.4    11.66 )-----------( 191      ( 2019 05:54 )             27.0           141  |  105  |  35<H>  ----------------------------<  108<H>  4.9   |  27  |  4.22<H>    Ca    7.1<L>      2019 05:54  Phos  5.5         TPro  x   /  Alb  2.2<L>  /  TBili  x   /  DBili  x   /  AST  x   /  ALT  x   /  AlkPhos  x         PT/INR - ( 10 Feb 2019 08:34 )   PT: 10.6 sec;   INR: 0.96 ratio         PTT - ( 10 Feb 2019 08:34 )  PTT:19.6 sec		      CT head  FINDINGS:      HEMISPHERES:There is generalized volume loss with mild to moderate   atrophy. Chronic ischemic changes are scattered but with no acute   abnormality. The vessels are extensively calcified, inclusive of the   anterior and posterior circulation.  VENTRICLES:  Midline and normal in size.  POSTERIOR FOSSA:  Ischemic changes in the brainstem noted of   indeterminate age.  EXTRACEREBRAL SPACES:  No subdural or epidural collections are noted.  SKULL BASE AND CALVARIUM:  Appears intact.  No fracture or destructive   lesion is identified.  SINUSES AND MASTOIDS:  Clear.  MISCELLANEOUS:  No orbital or suprasellar abnormality noted.      IMPRESSION:    1)  chronic ischemic changes with mild to moderate atrophy. Extensive   atherosclerotic calcification of the intervertebral vasculature..  2)  no intracerebral hemorrhage or contusion is identified.      CT Spine    IMPRESSION:    Cervical CT negative for acute fracture with multilevel degenerative   changes.            		    MEDICATIONS  (STANDING):  aspirin enteric coated 81 milliGRAM(s) Oral daily  atorvastatin 10 milliGRAM(s) Oral at bedtime  calcium carbonate 1250 mG  + Vitamin D (OsCal 500 + D) 1 Tablet(s) Oral daily  ceFAZolin   IVPB 2000 milliGRAM(s) IV Intermittent every 8 hours  clopidogrel Tablet 75 milliGRAM(s) Oral daily  docusate sodium 100 milliGRAM(s) Oral three times a day  metoprolol succinate  milliGRAM(s) Oral daily  polyethylene glycol 3350 17 Gram(s) Oral daily  senna 2 Tablet(s) Oral at bedtime  heparin 5000 units subcut every 12 hours        DVT Prophylaxis:  LMWH                   (  )  Heparin SQ           ( x )  Coumadin             (  )  Xarelto                  (  )  Eliquis                   (  )  Venodynes           ( x )  Ambulation          ( x )  UFH                       (  )  Contraindicated  (  )

## 2019-02-11 NOTE — DIETITIAN INITIAL EVALUATION ADULT. - OTHER INFO
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  pt found to have Lt hip Fx now s/p intramedullary nail fixation of left femur on 2/10.  Pt DNR/DNI.  Upon visit, pt appears underweight; BMI of 17.  NFPE significant for severe muscle wasting: clavicle.  moderate muscle wasting; temporal.  severe fat wasting; orbital.  Pt family at bedside reports wt loss of unknown amount.  EMR shows wt loss of 18# (15%), 9 months; clinically significant.  diet recall reveals pt meeting <75% of estimated nutr needs chronically 2/2 no appetite likely side affect of dialysis.  (+) trace Lt hip edema.  no BM on bowel regimen.  katherine score of 13, no PU.  based on above, pt meets criteria for severe malnutrition in chronic illness (severe muscle/fat wasting; 15% wt loss x 9 months, meeting <75% of ENN chronically).  labs noted; hyperphosphatemia, not currently on phosphate binders; monitor and add phosphate binders prn.  Pt dislikes nepro, willing to trial gelatein. Pt endorses difficulty swallowing; able to swallow soft textures and liquids easily.  Monitor and consult SLP prn.  RECOMMENDATIONS: 1) consider SLP eval prn 2) add gelatein TID 3) add nephrovite daily to ensure 100% RDI met 4) daily wt checks

## 2019-02-11 NOTE — CHART NOTE - NSCHARTNOTEFT_GEN_A_CORE
Upon Nutritional Assessment by the Registered Dietitian your patient was determined to meet criteria / has evidence of the following diagnosis/diagnoses:          [ ]  Mild Protein Calorie Malnutrition        [ ]  Moderate Protein Calorie Malnutrition        [x] Severe Protein Calorie Malnutrition        [ ] Unspecified Protein Calorie Malnutrition        [x] Underweight / BMI <19        [ ] Morbid Obesity / BMI > 40      Findings:  Upon visit, pt appears underweight; BMI of 17.  NFPE significant for severe muscle wasting: clavicle.  moderate muscle wasting; temporal.  severe fat wasting; orbital.  Pt family at bedside reports wt loss of unknown amount.  EMR shows wt loss of 18# (15%), 9 months; clinically significant.  diet recall reveals pt meeting <75% of estimated nutr needs chronically 2/2 no appetite likely side affect of dialysis.  (+) trace Lt hip edema.  no BM on bowel regimen.  katherine score of 13, no PU.      ***based on above, pt meets criteria for severe malnutrition in chronic illness (severe muscle/fat wasting; 15% wt loss x 9 months, meeting <75% of ENN chronically).***    Findings as based on:  •  Comprehensive nutrition assessment and consultation  •  Calorie counts (nutrient intake analysis)  •  Food acceptance and intake status from observations by staff  •  Follow up  •  Patient education  •  Intervention secondary to interdisciplinary rounds  •   concerns      Treatment:    The following diet has been recommended:  1) consider SLP eval prn   2) add gelatein TID   3) add nephrovite daily to ensure 100% RDI met   4) daily wt checks    PROVIDER Section:     By signing this assessment you are acknowledging and agree with the diagnosis/diagnoses assigned by the Registered Dietitian    Comments:

## 2019-02-11 NOTE — DISCHARGE NOTE ADULT - PLAN OF CARE
less pain and back to baseline function IM Nail DC Instructions:    1.	Resume previous diet, regular or diabetic as appropriate  2.	Weight Bearing as Tolerated with assistance and rolling walker  3.	Continue DVT/PE Prophylaxis. Lovenox or Heparin SC. See Med Rec for Duration and dose.  4.	PT daily  5.	Follow up with Orthopedic Surgeon SAHIL ASH in about 14 Days. Call Office For Appointment.  6.	Staples to be removed by RN at rehab Post-Op Day 14 (2/24), provided wound is healed, no open areas drainage.  7.	Ice the hip as much as possible  8.	Keep Aquacel bandage on Hip. Change only if wet or soiled using Tegaderm/Paper tape and dry gauze. No bandage needed after staple removal.  9.     OK to shower with Aquacel beige bandage, otherwise sponge bathe only. Will need assistance to shower

## 2019-02-11 NOTE — DISCHARGE NOTE ADULT - CARE PLAN
Principal Discharge DX:	Closed fracture of left hip, initial encounter  Goal:	less pain and back to baseline function  Assessment and plan of treatment:	IM Nail DC Instructions:    1.	Resume previous diet, regular or diabetic as appropriate  2.	Weight Bearing as Tolerated with assistance and rolling walker  3.	Continue DVT/PE Prophylaxis. Lovenox or Heparin SC. See Med Rec for Duration and dose.  4.	PT daily  5.	Follow up with Orthopedic Surgeon SAHIL ASH in about 14 Days. Call Office For Appointment.  6.	Staples to be removed by RN at rehab Post-Op Day 14 (2/24), provided wound is healed, no open areas drainage.  7.	Ice the hip as much as possible  8.	Keep Aquacel bandage on Hip. Change only if wet or soiled using Tegaderm/Paper tape and dry gauze. No bandage needed after staple removal.  9.     OK to shower with Aquacel beige bandage, otherwise sponge bathe only. Will need assistance to shower

## 2019-02-11 NOTE — CONSULT NOTE ADULT - REASON FOR ADMISSION
L hip pain
L hip pain, left hip fx
L hip pain
Adequate: hears normal conversation without difficulty

## 2019-02-11 NOTE — PHYSICAL THERAPY INITIAL EVALUATION ADULT - PERTINENT HX OF CURRENT PROBLEM, REHAB EVAL
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 s/p left comminuted intertrochanteric hip fx

## 2019-02-11 NOTE — DIETITIAN INITIAL EVALUATION ADULT. - PERTINENT LABORATORY DATA
02-11 Na141 mmol/L Glu 108 mg/dL<H> K+ 4.9 mmol/L Cr  4.22 mg/dL<H> BUN 35 mg/dL<H> Phos 5.5 mg/dL<H> Alb 2.2 g/dL<L> PAB n/a

## 2019-02-11 NOTE — DISCHARGE NOTE ADULT - CARE PROVIDER_API CALL
Morris Mcclure (DO)  Orthopaedic Surgery  [address] [place] [address]  Phone: [phone]  Fax: [phone]  Follow Up Time: Morris Mcclure (DO)  Orthopaedic Surgery  [address] [place] [address]  Phone: [phone]  Fax: [phone]  Follow Up Time:     Kulwinder Campos)  Internal Medicine  [address] [facility], [address]  [place] [address]  Phone: [phone]  Fax: [phone]  Follow Up Time:

## 2019-02-11 NOTE — CONSULT NOTE ADULT - ASSESSMENT
89 yo woman with ESRD post mechanical fall sustaining Left Hip Fx.  Post repair.  No complications.  --ESRD  :  Continue T/T/S TIW HD schedule.  --Fluid/Electrolytes stable  --D/c IVF  --Start diet.  --Monitor BP off meds for now.
A:  89 yo female S/P fall now on 2 north S/P left HIP ORIF, high VTE risk due to age, surgery and immobolity and high bleed risk due to Fracture . age and CKD      P: Start heparin 5000 units sq q 12h, start stat  daily CBC, BMP  venodynes BLE  INC mobility as naun    Thank you for the consult will follow
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  No CP or SOB or palpitations.  On my exam, slightly hypertensive, NAD, awake and alert.  + L hip pain.  She gets HD T, Thurs and Sat.  Seen by ortho.   ECG--NSR [facility]  Admission CXR--no infiltrates/cardiomegaly   Patient does not remeber fall . She denies recent CP or SOB on exertion . I have no record as to when her last cardiac evaluation was .   1) given need for surgery I will not delay surgery . There are no absolute contraindications at this time .No symtpms of CP prior to fall , no evidenec of decompenstaed CHF on exam or by CXR , no significant murmur on exam   2) proceed with surgery ,  3) continue BBlockers/ Amlodipine periop , cont ASA given distant h/o CAD   4) DVT prophylaxisis  will follow with you post op

## 2019-02-11 NOTE — PROGRESS NOTE ADULT - ASSESSMENT
89 yo woman with ESRD post mechanical fall sustaining Left Hip Fx.  Post repair.  No complications.  --ESRD  :  Continue T/T/S TIW HD schedule.  --Fluid/Electrolytes stable  --D/c IVF  --Start diet.  --Monitor BP off meds for now.    2/11 SY  --ESRD : HD in am  --Fluid status stable  --Anemia: likely acute blood loss :follow up and tx as indicated.

## 2019-02-11 NOTE — DISCHARGE NOTE ADULT - MEDICATION SUMMARY - MEDICATIONS TO TAKE
I will START or STAY ON the medications listed below when I get home from the hospital:    aspirin 81 mg oral tablet  -- 1 tab(s) by mouth once a day  -- Indication: For Cardiac    acetaminophen 325 mg oral tablet  -- 2 tab(s) by mouth every 6 hours, As needed, Temp greater or equal to 38.5C (101.3F), Moderate Pain (4 - 6)  -- Indication: For prn for pain    oxyCODONE 5 mg oral tablet  -- 1 tab(s) by mouth every 4 hours, As needed, Severe Pain (7 - 10)  -- Indication: For prn for pain    heparin  -- 5000 unit(s) subcutaneous 2 times a day for total of 4 weeks post-operatively  -- Indication: For DVT proph postop    pravastatin 10 mg oral tablet  -- 1 tab(s) by mouth once a day (at bedtime)  -- Indication: For Cholesterol    clopidogrel 75 mg oral tablet  -- 1 tab(s) by mouth once a day  -- Indication: For Cardiac    metoprolol succinate 100 mg oral tablet, extended release  -- 1 tab(s) by mouth once a day (at bedtime)  -- Indication: For HTN    amLODIPine 2.5 mg oral tablet  -- 1 tab(s) by mouth once a day (at bedtime)  -- Indication: For HTN    bisacodyl 10 mg rectal suppository  -- 1 suppository(ies) rectally once a day, As needed, If no bowel movement  -- Indication: For bowel regimen    docusate sodium 100 mg oral capsule  -- 1 cap(s) by mouth 3 times a day  -- Indication: For bowel regimen    polyethylene glycol 3350 oral powder for reconstitution  -- 17 gram(s) by mouth once a day  -- Indication: For bowel regimen    senna oral tablet  -- 2 tab(s) by mouth once a day (at bedtime)  -- Indication: For bowel regimen    melatonin 3 mg oral tablet  -- 1 tab(s) by mouth once a day (at bedtime), As needed, Insomnia  -- Indication: For prn for sleep    calcium-vitamin D 500 mg-200 intl units oral tablet  -- 1 tab(s) by mouth once a day  -- Indication: For vitamin

## 2019-02-11 NOTE — DIETITIAN INITIAL EVALUATION ADULT. - PERTINENT MEDS FT
MEDICATIONS  (STANDING):  aspirin enteric coated 81 milliGRAM(s) Oral daily  atorvastatin 10 milliGRAM(s) Oral at bedtime  calcium carbonate 1250 mG  + Vitamin D (OsCal 500 + D) 1 Tablet(s) Oral daily  ceFAZolin   IVPB 2000 milliGRAM(s) IV Intermittent every 8 hours  clopidogrel Tablet 75 milliGRAM(s) Oral daily  docusate sodium 100 milliGRAM(s) Oral three times a day  metoprolol succinate  milliGRAM(s) Oral daily  polyethylene glycol 3350 17 Gram(s) Oral daily  senna 2 Tablet(s) Oral at bedtime    MEDICATIONS  (PRN):  acetaminophen   Tablet .. 650 milliGRAM(s) Oral every 8 hours PRN Mild Pain (1 - 3)  acetaminophen   Tablet .. 650 milliGRAM(s) Oral every 6 hours PRN Temp greater or equal to 38.5C (101.3F), Moderate Pain (4 - 6)  HYDROmorphone  Injectable 0.5 milliGRAM(s) IV Push every 4 hours PRN Severe Pain (7 - 10)  magnesium hydroxide Suspension 30 milliLiter(s) Oral daily PRN Constipation  melatonin 3 milliGRAM(s) Oral at bedtime PRN Insomnia  ondansetron Injectable 4 milliGRAM(s) IV Push every 6 hours PRN Nausea and/or Vomiting  oxyCODONE    IR 2.5 milliGRAM(s) Oral every 4 hours PRN Moderate Pain (4 - 6)  oxyCODONE    IR 5 milliGRAM(s) Oral every 4 hours PRN Severe Pain (7 - 10)  traZODone 25 milliGRAM(s) Oral at bedtime PRN insomnia

## 2019-02-11 NOTE — DISCHARGE NOTE ADULT - PROVIDER TOKENS
PROVIDER:[TOKEN:[[id number]]] PROVIDER:[TOKEN:[[id number]]],PROVIDER:[TOKEN:[[id number]]]

## 2019-02-11 NOTE — DIETITIAN INITIAL EVALUATION ADULT. - PHYSICAL APPEARANCE
underweight/NFPE significant for severe muscle wasting: clavicle.  moderate muscle wasting; temporal.  severe fat wasting; orbital./other (specify)

## 2019-02-11 NOTE — DISCHARGE NOTE ADULT - PATIENT PORTAL LINK FT
You can access the [facility] Patient Portal, offered by [facility], by registering with the following website: [email or web address]

## 2019-02-12 LAB
ALBUMIN SERPL ELPH-MCNC: 2.1 G/DL — LOW (ref 3.3–5)
ANION GAP SERPL CALC-SCNC: 10 MMOL/L — SIGNIFICANT CHANGE UP (ref 5–17)
BASOPHILS # BLD AUTO: 0.05 K/UL — SIGNIFICANT CHANGE UP (ref 0–0.2)
BASOPHILS NFR BLD AUTO: 0.5 % — SIGNIFICANT CHANGE UP (ref 0–2)
BUN SERPL-MCNC: 57 MG/DL — HIGH (ref 7–23)
CALCIUM SERPL-MCNC: 7 MG/DL — LOW (ref 8.5–10.1)
CHLORIDE SERPL-SCNC: 102 MMOL/L — SIGNIFICANT CHANGE UP (ref 96–108)
CO2 SERPL-SCNC: 24 MMOL/L — SIGNIFICANT CHANGE UP (ref 22–31)
CREAT SERPL-MCNC: 5.37 MG/DL — HIGH (ref 0.5–1.3)
EOSINOPHIL # BLD AUTO: 0 K/UL — SIGNIFICANT CHANGE UP (ref 0–0.5)
EOSINOPHIL NFR BLD AUTO: 0 % — SIGNIFICANT CHANGE UP (ref 0–6)
GLUCOSE SERPL-MCNC: 114 MG/DL — HIGH (ref 70–99)
HCT VFR BLD CALC: 30.1 % — LOW (ref 34.5–45)
HGB BLD-MCNC: 9.8 G/DL — LOW (ref 11.5–15.5)
IMM GRANULOCYTES NFR BLD AUTO: 0.5 % — SIGNIFICANT CHANGE UP (ref 0–1.5)
LYMPHOCYTES # BLD AUTO: 1.29 K/UL — SIGNIFICANT CHANGE UP (ref 1–3.3)
LYMPHOCYTES # BLD AUTO: 12.1 % — LOW (ref 13–44)
MCHC RBC-ENTMCNC: 29.6 PG — SIGNIFICANT CHANGE UP (ref 27–34)
MCHC RBC-ENTMCNC: 32.6 GM/DL — SIGNIFICANT CHANGE UP (ref 32–36)
MCV RBC AUTO: 90.9 FL — SIGNIFICANT CHANGE UP (ref 80–100)
MONOCYTES # BLD AUTO: 1.44 K/UL — HIGH (ref 0–0.9)
MONOCYTES NFR BLD AUTO: 13.6 % — SIGNIFICANT CHANGE UP (ref 2–14)
NEUTROPHILS # BLD AUTO: 7.79 K/UL — HIGH (ref 1.8–7.4)
NEUTROPHILS NFR BLD AUTO: 73.3 % — SIGNIFICANT CHANGE UP (ref 43–77)
NRBC # BLD: 0 /100 WBCS — SIGNIFICANT CHANGE UP (ref 0–0)
PHOSPHATE SERPL-MCNC: 5.1 MG/DL — HIGH (ref 2.5–4.5)
PLATELET # BLD AUTO: 180 K/UL — SIGNIFICANT CHANGE UP (ref 150–400)
POTASSIUM SERPL-MCNC: 4.3 MMOL/L — SIGNIFICANT CHANGE UP (ref 3.5–5.3)
POTASSIUM SERPL-SCNC: 4.3 MMOL/L — SIGNIFICANT CHANGE UP (ref 3.5–5.3)
RBC # BLD: 3.31 M/UL — LOW (ref 3.8–5.2)
RBC # FLD: 15.5 % — HIGH (ref 10.3–14.5)
SODIUM SERPL-SCNC: 136 MMOL/L — SIGNIFICANT CHANGE UP (ref 135–145)
WBC # BLD: 10.62 K/UL — HIGH (ref 3.8–10.5)
WBC # FLD AUTO: 10.62 K/UL — HIGH (ref 3.8–10.5)

## 2019-02-12 PROCEDURE — 99233 SBSQ HOSP IP/OBS HIGH 50: CPT

## 2019-02-12 RX ORDER — ERYTHROPOIETIN 10000 [IU]/ML
3000 INJECTION, SOLUTION INTRAVENOUS; SUBCUTANEOUS
Qty: 0 | Refills: 0 | Status: DISCONTINUED | OUTPATIENT
Start: 2019-02-12 | End: 2019-02-13

## 2019-02-12 RX ORDER — ERYTHROPOIETIN 10000 [IU]/ML
2000 INJECTION, SOLUTION INTRAVENOUS; SUBCUTANEOUS
Qty: 0 | Refills: 0 | Status: DISCONTINUED | OUTPATIENT
Start: 2019-02-12 | End: 2019-02-13

## 2019-02-12 RX ADMIN — CLOPIDOGREL BISULFATE 75 MILLIGRAM(S): 75 TABLET, FILM COATED ORAL at 16:21

## 2019-02-12 RX ADMIN — ERYTHROPOIETIN 2000 UNIT(S): 10000 INJECTION, SOLUTION INTRAVENOUS; SUBCUTANEOUS at 13:10

## 2019-02-12 RX ADMIN — HEPARIN SODIUM 5000 UNIT(S): 5000 INJECTION INTRAVENOUS; SUBCUTANEOUS at 06:21

## 2019-02-12 RX ADMIN — OXYCODONE HYDROCHLORIDE 5 MILLIGRAM(S): 5 TABLET ORAL at 12:48

## 2019-02-12 RX ADMIN — Medication 81 MILLIGRAM(S): at 16:21

## 2019-02-12 RX ADMIN — OXYCODONE HYDROCHLORIDE 5 MILLIGRAM(S): 5 TABLET ORAL at 17:25

## 2019-02-12 RX ADMIN — Medication 100 MILLIGRAM(S): at 06:21

## 2019-02-12 RX ADMIN — ATORVASTATIN CALCIUM 10 MILLIGRAM(S): 80 TABLET, FILM COATED ORAL at 21:53

## 2019-02-12 RX ADMIN — Medication 1 TABLET(S): at 16:21

## 2019-02-12 RX ADMIN — OXYCODONE HYDROCHLORIDE 5 MILLIGRAM(S): 5 TABLET ORAL at 13:09

## 2019-02-12 RX ADMIN — ERYTHROPOIETIN 3000 UNIT(S): 10000 INJECTION, SOLUTION INTRAVENOUS; SUBCUTANEOUS at 13:10

## 2019-02-12 RX ADMIN — OXYCODONE HYDROCHLORIDE 5 MILLIGRAM(S): 5 TABLET ORAL at 16:28

## 2019-02-12 RX ADMIN — HEPARIN SODIUM 5000 UNIT(S): 5000 INJECTION INTRAVENOUS; SUBCUTANEOUS at 18:43

## 2019-02-12 NOTE — PROGRESS NOTE ADULT - ASSESSMENT
A 89 yo female S/P fall now on 2 north S/P left HIP IMN, high VTE risk due to age, surgery and immobility and high bleed risk due to fracture, age and CKD.      Plan:   c/w heparin 5000 units sq q12h for VTE ppx  c/w DAPT   daily CBC, BMP  venodynes BLE  INC mobility as naun    Will continue to follow.

## 2019-02-12 NOTE — PROGRESS NOTE ADULT - ASSESSMENT
89 yo woman with ESRD post mechanical fall sustaining Left Hip Fx.  Post repair.  No complications.  --ESRD  :  Continue T/T/S TIW HD schedule.  --Fluid/Electrolytes stable  --D/c IVF  --Start diet.  --Monitor BP off meds for now.    2/11 SY  --ESRD : HD in am  --Fluid status stable  --Anemia: likely acute blood loss :follow up and tx as indicated.    2/12 SY  --ESRD : HD order and tx reviewed.  Tolerating tx well.  --Post Left Hip fx repair.  --Fluid and eletrolytes stable.

## 2019-02-12 NOTE — PROGRESS NOTE ADULT - ASSESSMENT
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     1. Postop care. Pt has no active CP or signs of decompensation.   Pain control and PT as per ortho.    2. HTN. Well controlled. Cont current regimen.    3. ESRD on HD. Renal following. Cont HD.     4. Hold on further cardiac testing for now, can f/u as outpt.     5. DVT proph.

## 2019-02-13 VITALS
OXYGEN SATURATION: 98 % | DIASTOLIC BLOOD PRESSURE: 54 MMHG | RESPIRATION RATE: 16 BRPM | SYSTOLIC BLOOD PRESSURE: 110 MMHG | TEMPERATURE: 97 F | HEART RATE: 66 BPM

## 2019-02-13 LAB
ANION GAP SERPL CALC-SCNC: 8 MMOL/L — SIGNIFICANT CHANGE UP (ref 5–17)
BUN SERPL-MCNC: 27 MG/DL — HIGH (ref 7–23)
CALCIUM SERPL-MCNC: 7.2 MG/DL — LOW (ref 8.5–10.1)
CHLORIDE SERPL-SCNC: 105 MMOL/L — SIGNIFICANT CHANGE UP (ref 96–108)
CO2 SERPL-SCNC: 28 MMOL/L — SIGNIFICANT CHANGE UP (ref 22–31)
CREAT SERPL-MCNC: 3.49 MG/DL — HIGH (ref 0.5–1.3)
GLUCOSE SERPL-MCNC: 113 MG/DL — HIGH (ref 70–99)
HCT VFR BLD CALC: 29.9 % — LOW (ref 34.5–45)
HGB BLD-MCNC: 9.5 G/DL — LOW (ref 11.5–15.5)
MCHC RBC-ENTMCNC: 29.3 PG — SIGNIFICANT CHANGE UP (ref 27–34)
MCHC RBC-ENTMCNC: 31.8 GM/DL — LOW (ref 32–36)
MCV RBC AUTO: 92.3 FL — SIGNIFICANT CHANGE UP (ref 80–100)
NRBC # BLD: 0 /100 WBCS — SIGNIFICANT CHANGE UP (ref 0–0)
PLATELET # BLD AUTO: 215 K/UL — SIGNIFICANT CHANGE UP (ref 150–400)
POTASSIUM SERPL-MCNC: 3.6 MMOL/L — SIGNIFICANT CHANGE UP (ref 3.5–5.3)
POTASSIUM SERPL-SCNC: 3.6 MMOL/L — SIGNIFICANT CHANGE UP (ref 3.5–5.3)
RBC # BLD: 3.24 M/UL — LOW (ref 3.8–5.2)
RBC # FLD: 15 % — HIGH (ref 10.3–14.5)
SODIUM SERPL-SCNC: 141 MMOL/L — SIGNIFICANT CHANGE UP (ref 135–145)
WBC # BLD: 10.12 K/UL — SIGNIFICANT CHANGE UP (ref 3.8–10.5)
WBC # FLD AUTO: 10.12 K/UL — SIGNIFICANT CHANGE UP (ref 3.8–10.5)

## 2019-02-13 PROCEDURE — 99232 SBSQ HOSP IP/OBS MODERATE 35: CPT

## 2019-02-13 RX ORDER — ACETAMINOPHEN 500 MG
2 TABLET ORAL
Qty: 0 | Refills: 0 | COMMUNITY
Start: 2019-02-13

## 2019-02-13 RX ORDER — SENNA PLUS 8.6 MG/1
2 TABLET ORAL
Qty: 0 | Refills: 0 | COMMUNITY
Start: 2019-02-13

## 2019-02-13 RX ORDER — OXYCODONE HYDROCHLORIDE 5 MG/1
1 TABLET ORAL
Qty: 0 | Refills: 0 | COMMUNITY
Start: 2019-02-13

## 2019-02-13 RX ORDER — HEPARIN SODIUM 5000 [USP'U]/ML
5000 INJECTION INTRAVENOUS; SUBCUTANEOUS
Qty: 0 | Refills: 0 | COMMUNITY
Start: 2019-02-13

## 2019-02-13 RX ORDER — POLYETHYLENE GLYCOL 3350 17 G/17G
17 POWDER, FOR SOLUTION ORAL
Qty: 0 | Refills: 0 | COMMUNITY
Start: 2019-02-13

## 2019-02-13 RX ORDER — CALCIUM ACETATE 667 MG
1 TABLET ORAL
Qty: 0 | Refills: 0 | COMMUNITY

## 2019-02-13 RX ORDER — AMLODIPINE BESYLATE 2.5 MG/1
1 TABLET ORAL
Qty: 0 | Refills: 0 | COMMUNITY

## 2019-02-13 RX ORDER — DOCUSATE SODIUM 100 MG
1 CAPSULE ORAL
Qty: 0 | Refills: 0 | COMMUNITY
Start: 2019-02-13

## 2019-02-13 RX ORDER — LANOLIN ALCOHOL/MO/W.PET/CERES
1 CREAM (GRAM) TOPICAL
Qty: 0 | Refills: 0 | COMMUNITY
Start: 2019-02-13

## 2019-02-13 RX ADMIN — OXYCODONE HYDROCHLORIDE 5 MILLIGRAM(S): 5 TABLET ORAL at 09:40

## 2019-02-13 RX ADMIN — OXYCODONE HYDROCHLORIDE 5 MILLIGRAM(S): 5 TABLET ORAL at 08:43

## 2019-02-13 RX ADMIN — Medication 100 MILLIGRAM(S): at 06:17

## 2019-02-13 RX ADMIN — CLOPIDOGREL BISULFATE 75 MILLIGRAM(S): 75 TABLET, FILM COATED ORAL at 12:31

## 2019-02-13 RX ADMIN — Medication 1 TABLET(S): at 12:31

## 2019-02-13 RX ADMIN — Medication 81 MILLIGRAM(S): at 12:31

## 2019-02-13 RX ADMIN — HEPARIN SODIUM 5000 UNIT(S): 5000 INJECTION INTRAVENOUS; SUBCUTANEOUS at 06:17

## 2019-02-13 RX ADMIN — OXYCODONE HYDROCHLORIDE 5 MILLIGRAM(S): 5 TABLET ORAL at 17:29

## 2019-02-13 NOTE — PROGRESS NOTE ADULT - ASSESSMENT
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     1. Postop care. Pt has no active CP or signs of decompensation.   Pain control and PT as per ortho. Cont PT/OOB.    2. HTN. Well controlled. Cont current regimen.    3. ESRD on HD. Renal following. Cont HD.     4. Hold on further cardiac testing for now, can f/u as outpt.     5. DVT proph.

## 2019-02-13 NOTE — PROGRESS NOTE ADULT - ASSESSMENT
89 yo woman with ESRD post mechanical fall sustaining Left Hip Fx.  Post repair.  No complications.  --ESRD  :  Continue T/T/S TIW HD schedule.  --Fluid/Electrolytes stable  --D/c IVF  --Start diet.  --Monitor BP off meds for now.    2/11 SY  --ESRD : HD in am  --Fluid status stable  --Anemia: likely acute blood loss :follow up and tx as indicated.    2/12 SY  --ESRD : HD order and tx reviewed.  Tolerating tx well.  --Post Left Hip fx repair.  --Fluid and eletrolytes stable.    2/13 SY  --ESRD : Continue TIW HD.  --Post Left Hip fx repair :  Rehab.  --Monitor po intake.

## 2019-02-13 NOTE — PROGRESS NOTE ADULT - REASON FOR ADMISSION
L hip pain

## 2019-02-13 NOTE — PROGRESS NOTE ADULT - ASSESSMENT
A 89 yo female S/P fall now on 2 north S/P left HIP IMN, high VTE risk due to age, surgery and immobility and high bleed risk due to fracture, age and CKD.      Plan:   c/w heparin 5000 units sq q12h for VTE ppx  c/w DAPT   daily CBC, BMP  venodynes BLE  INC mobility as naun    Will continue to follow.     Dispo: rehab

## 2019-02-15 DIAGNOSIS — Z79.82 LONG TERM (CURRENT) USE OF ASPIRIN: ICD-10-CM

## 2019-02-15 DIAGNOSIS — S72.002A FRACTURE OF UNSPECIFIED PART OF NECK OF LEFT FEMUR, INITIAL ENCOUNTER FOR CLOSED FRACTURE: ICD-10-CM

## 2019-02-15 DIAGNOSIS — D62 ACUTE POSTHEMORRHAGIC ANEMIA: ICD-10-CM

## 2019-02-15 DIAGNOSIS — I25.10 ATHEROSCLEROTIC HEART DISEASE OF NATIVE CORONARY ARTERY WITHOUT ANGINA PECTORIS: ICD-10-CM

## 2019-02-15 DIAGNOSIS — Z79.899 OTHER LONG TERM (CURRENT) DRUG THERAPY: ICD-10-CM
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
## 2019-02-15 DIAGNOSIS — Z88.2 ALLERGY STATUS TO SULFONAMIDES: ICD-10-CM

## 2019-02-15 DIAGNOSIS — E43 UNSPECIFIED SEVERE PROTEIN-CALORIE MALNUTRITION: ICD-10-CM

## 2019-02-15 DIAGNOSIS — I12.0 HYPERTENSIVE CHRONIC KIDNEY DISEASE WITH STAGE 5 CHRONIC KIDNEY DISEASE OR END STAGE RENAL DISEASE: ICD-10-CM

## 2019-02-15 DIAGNOSIS — N18.6 END STAGE RENAL DISEASE: ICD-10-CM

## 2019-02-28 ENCOUNTER — EMERGENCY (EMERGENCY)
Facility: HOSPITAL | Age: 84
LOS: 0 days | Discharge: HOME | End: 2019-03-01
Attending: EMERGENCY MEDICINE | Admitting: EMERGENCY MEDICINE
Payer: MEDICARE

## 2019-02-28 VITALS
HEIGHT: 60 IN | TEMPERATURE: 98 F | RESPIRATION RATE: 18 BRPM | HEART RATE: 74 BPM | DIASTOLIC BLOOD PRESSURE: 56 MMHG | SYSTOLIC BLOOD PRESSURE: 138 MMHG | WEIGHT: 100.09 LBS | OXYGEN SATURATION: 99 %

## 2019-02-28 DIAGNOSIS — S00.91XA ABRASION OF UNSPECIFIED PART OF HEAD, INITIAL ENCOUNTER: ICD-10-CM

## 2019-02-28 DIAGNOSIS — Z95.5 PRESENCE OF CORONARY ANGIOPLASTY IMPLANT AND GRAFT: ICD-10-CM

## 2019-02-28 DIAGNOSIS — Z79.82 LONG TERM (CURRENT) USE OF ASPIRIN: ICD-10-CM

## 2019-02-28 DIAGNOSIS — E78.5 HYPERLIPIDEMIA, UNSPECIFIED: ICD-10-CM

## 2019-02-28 DIAGNOSIS — Z95.5 PRESENCE OF CORONARY ANGIOPLASTY IMPLANT AND GRAFT: Chronic | ICD-10-CM

## 2019-02-28 DIAGNOSIS — I12.0 HYPERTENSIVE CHRONIC KIDNEY DISEASE WITH STAGE 5 CHRONIC KIDNEY DISEASE OR END STAGE RENAL DISEASE: ICD-10-CM
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
## 2019-02-28 DIAGNOSIS — R51 HEADACHE: ICD-10-CM

## 2019-02-28 DIAGNOSIS — I25.10 ATHEROSCLEROTIC HEART DISEASE OF NATIVE CORONARY ARTERY WITHOUT ANGINA PECTORIS: ICD-10-CM

## 2019-02-28 DIAGNOSIS — Z79.01 LONG TERM (CURRENT) USE OF ANTICOAGULANTS: ICD-10-CM

## 2019-02-28 DIAGNOSIS — N18.9 CHRONIC KIDNEY DISEASE, UNSPECIFIED: ICD-10-CM

## 2019-02-28 DIAGNOSIS — Z90.721 ACQUIRED ABSENCE OF OVARIES, UNILATERAL: Chronic | ICD-10-CM

## 2019-02-28 DIAGNOSIS — Z98.890 OTHER SPECIFIED POSTPROCEDURAL STATES: Chronic | ICD-10-CM

## 2019-02-28 DIAGNOSIS — S00.93XA CONTUSION OF UNSPECIFIED PART OF HEAD, INITIAL ENCOUNTER: ICD-10-CM
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
## 2019-02-28 PROCEDURE — 72125 CT NECK SPINE W/O DYE: CPT | Mod: 26

## 2019-02-28 PROCEDURE — 99284 EMERGENCY DEPT VISIT MOD MDM: CPT | Mod: 25

## 2019-02-28 PROCEDURE — 70450 CT HEAD/BRAIN W/O DYE: CPT | Mod: 26

## 2019-02-28 NOTE — ED PROVIDER NOTE - CLINICAL SUMMARY MEDICAL DECISION MAKING FREE TEXT BOX
Pt presents s/p fall. Pt states she turned and fell. Plan CAT scans. Pt presents s/p fall. Pt states she turned and fell. Plan CAT scans.  Abrasion, will plan bacitracin

## 2019-02-28 NOTE — ED PROVIDER NOTE - OBJECTIVE STATEMENT
89 y/o female with a PMHx of CAD, HTN, HLD, CKD, on ASA and Plavis presents to the ED s/p fall today. Pt slipped and fell while at her assisted living facility. No LOC. Pt now presenting with an abrasion to the back of her head. Pt also c/o pain in the back of her head. Denies pain in any other area.

## 2019-02-28 NOTE — ED PROVIDER NOTE - SKIN, MLM
Skin normal color for race, warm, dry +ecchymosis, old left hip, surgical scar, hematoma left occipital Skin normal color for race, warm, dry, old ecchymosis left hip, surgical scar clean dry intact, hematoma left occipital with abrasion, no active bleeding.

## 2019-02-28 NOTE — ED ADULT NURSE NOTE - NSIMPLEMENTINTERV_GEN_ALL_ED
Implemented All Fall with Harm Risk Interventions:  [place] to call system. Call bell, personal items and telephone within reach. Instruct patient to call for assistance. Room bathroom lighting operational. Non-slip footwear when patient is off stretcher. Physically safe environment: no spills, clutter or unnecessary equipment. Stretcher in lowest position, wheels locked, appropriate side rails in place. Provide visual cue, wrist band, yellow gown, etc. Monitor gait and stability. Monitor for mental status changes and reorient to person, place, and time. Review medications for side effects contributing to fall risk. Reinforce activity limits and safety measures with patient and family. Provide visual clues: red socks.

## 2019-03-01 VITALS
HEART RATE: 73 BPM | RESPIRATION RATE: 16 BRPM | DIASTOLIC BLOOD PRESSURE: 63 MMHG | TEMPERATURE: 98 F | OXYGEN SATURATION: 98 % | SYSTOLIC BLOOD PRESSURE: 123 MMHG

## 2019-03-01 RX ORDER — ACETAMINOPHEN 500 MG
1000 TABLET ORAL ONCE
Qty: 0 | Refills: 0 | Status: COMPLETED | OUTPATIENT
Start: 2019-03-01 | End: 2019-03-01

## 2019-03-01 RX ADMIN — Medication 1000 MILLIGRAM(S): at 00:28

## 2019-04-20 ENCOUNTER — EMERGENCY (EMERGENCY)
Facility: HOSPITAL | Age: 84
LOS: 0 days | Discharge: HOME | End: 2019-04-20
Attending: FAMILY MEDICINE | Admitting: FAMILY MEDICINE
Payer: MEDICARE

## 2019-04-20 VITALS
DIASTOLIC BLOOD PRESSURE: 46 MMHG | TEMPERATURE: 98 F | WEIGHT: 87.08 LBS | HEIGHT: 60 IN | HEART RATE: 64 BPM | RESPIRATION RATE: 18 BRPM | SYSTOLIC BLOOD PRESSURE: 100 MMHG | OXYGEN SATURATION: 100 %

## 2019-04-20 DIAGNOSIS — Z95.5 PRESENCE OF CORONARY ANGIOPLASTY IMPLANT AND GRAFT: Chronic | ICD-10-CM
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
## 2019-04-20 DIAGNOSIS — Z98.890 OTHER SPECIFIED POSTPROCEDURAL STATES: Chronic | ICD-10-CM
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
## 2019-04-20 DIAGNOSIS — S01.01XA LACERATION WITHOUT FOREIGN BODY OF SCALP, INITIAL ENCOUNTER: ICD-10-CM
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
## 2019-04-20 DIAGNOSIS — S09.90XA UNSPECIFIED INJURY OF HEAD, INITIAL ENCOUNTER: ICD-10-CM

## 2019-04-20 DIAGNOSIS — Z90.721 ACQUIRED ABSENCE OF OVARIES, UNILATERAL: Chronic | ICD-10-CM

## 2019-04-20 LAB
ALBUMIN SERPL ELPH-MCNC: 2.5 G/DL — LOW (ref 3.3–5)
ALP SERPL-CCNC: 115 U/L — SIGNIFICANT CHANGE UP (ref 40–120)
ALT FLD-CCNC: 18 U/L — SIGNIFICANT CHANGE UP (ref 12–78)
ANION GAP SERPL CALC-SCNC: 5 MMOL/L — SIGNIFICANT CHANGE UP (ref 5–17)
APTT BLD: 25.2 SEC — LOW (ref 27.5–36.3)
AST SERPL-CCNC: 47 U/L — HIGH (ref 15–37)
BASOPHILS # BLD AUTO: 0 K/UL — SIGNIFICANT CHANGE UP (ref 0–0.2)
BASOPHILS NFR BLD AUTO: 0 % — SIGNIFICANT CHANGE UP (ref 0–2)
BILIRUB SERPL-MCNC: 0.5 MG/DL — SIGNIFICANT CHANGE UP (ref 0.2–1.2)
BUN SERPL-MCNC: 15 MG/DL — SIGNIFICANT CHANGE UP (ref 7–23)
CALCIUM SERPL-MCNC: 7.8 MG/DL — LOW (ref 8.5–10.1)
CHLORIDE SERPL-SCNC: 102 MMOL/L — SIGNIFICANT CHANGE UP (ref 96–108)
CO2 SERPL-SCNC: 31 MMOL/L — SIGNIFICANT CHANGE UP (ref 22–31)
CREAT SERPL-MCNC: 2.12 MG/DL — HIGH (ref 0.5–1.3)
EOSINOPHIL # BLD AUTO: 0 K/UL — SIGNIFICANT CHANGE UP (ref 0–0.5)
EOSINOPHIL NFR BLD AUTO: 0 % — SIGNIFICANT CHANGE UP (ref 0–6)
GLUCOSE SERPL-MCNC: 132 MG/DL — HIGH (ref 70–99)
HCT VFR BLD CALC: 37 % — SIGNIFICANT CHANGE UP (ref 34.5–45)
HGB BLD-MCNC: 11.4 G/DL — LOW (ref 11.5–15.5)
HYPOCHROMIA BLD QL: SLIGHT — SIGNIFICANT CHANGE UP
INR BLD: 0.99 RATIO — SIGNIFICANT CHANGE UP (ref 0.88–1.16)
LYMPHOCYTES # BLD AUTO: 1.22 K/UL — SIGNIFICANT CHANGE UP (ref 1–3.3)
LYMPHOCYTES # BLD AUTO: 17 % — SIGNIFICANT CHANGE UP (ref 13–44)
MANUAL SMEAR VERIFICATION: SIGNIFICANT CHANGE UP
MCHC RBC-ENTMCNC: 30.5 PG — SIGNIFICANT CHANGE UP (ref 27–34)
MCHC RBC-ENTMCNC: 30.8 GM/DL — LOW (ref 32–36)
MCV RBC AUTO: 98.9 FL — SIGNIFICANT CHANGE UP (ref 80–100)
MONOCYTES # BLD AUTO: 0.79 K/UL — SIGNIFICANT CHANGE UP (ref 0–0.9)
MONOCYTES NFR BLD AUTO: 11 % — SIGNIFICANT CHANGE UP (ref 2–14)
NEUTROPHILS # BLD AUTO: 5.17 K/UL — SIGNIFICANT CHANGE UP (ref 1.8–7.4)
NEUTROPHILS NFR BLD AUTO: 71 % — SIGNIFICANT CHANGE UP (ref 43–77)
NEUTS BAND # BLD: 1 % — SIGNIFICANT CHANGE UP (ref 0–8)
NRBC # BLD: 0 /100 — SIGNIFICANT CHANGE UP (ref 0–0)
NRBC # BLD: SIGNIFICANT CHANGE UP /100 WBCS (ref 0–0)
PLAT MORPH BLD: NORMAL — SIGNIFICANT CHANGE UP
PLATELET # BLD AUTO: 219 K/UL — SIGNIFICANT CHANGE UP (ref 150–400)
POLYCHROMASIA BLD QL SMEAR: SLIGHT — SIGNIFICANT CHANGE UP
POTASSIUM SERPL-MCNC: 4.7 MMOL/L — SIGNIFICANT CHANGE UP (ref 3.5–5.3)
POTASSIUM SERPL-SCNC: 4.7 MMOL/L — SIGNIFICANT CHANGE UP (ref 3.5–5.3)
PROT SERPL-MCNC: 5.6 GM/DL — LOW (ref 6–8.3)
PROTHROM AB SERPL-ACNC: 11 SEC — SIGNIFICANT CHANGE UP (ref 10–12.9)
RBC # BLD: 3.74 M/UL — LOW (ref 3.8–5.2)
RBC # FLD: 15.8 % — HIGH (ref 10.3–14.5)
RBC BLD AUTO: SIGNIFICANT CHANGE UP
SODIUM SERPL-SCNC: 138 MMOL/L — SIGNIFICANT CHANGE UP (ref 135–145)
WBC # BLD: 7.18 K/UL — SIGNIFICANT CHANGE UP (ref 3.8–10.5)
WBC # FLD AUTO: 7.18 K/UL — SIGNIFICANT CHANGE UP (ref 3.8–10.5)

## 2019-04-20 PROCEDURE — 12001 RPR S/N/AX/GEN/TRNK 2.5CM/<: CPT

## 2019-04-20 PROCEDURE — 93010 ELECTROCARDIOGRAM REPORT: CPT

## 2019-04-20 PROCEDURE — 99284 EMERGENCY DEPT VISIT MOD MDM: CPT | Mod: 25

## 2019-04-20 PROCEDURE — 70450 CT HEAD/BRAIN W/O DYE: CPT | Mod: 26

## 2019-04-20 PROCEDURE — 72170 X-RAY EXAM OF PELVIS: CPT | Mod: 26

## 2019-04-20 PROCEDURE — 71045 X-RAY EXAM CHEST 1 VIEW: CPT | Mod: 26

## 2019-04-20 RX ORDER — ACETAMINOPHEN 500 MG
650 TABLET ORAL ONCE
Qty: 0 | Refills: 0 | Status: DISCONTINUED | OUTPATIENT
Start: 2019-04-20 | End: 2019-04-20

## 2019-04-20 RX ORDER — TETANUS TOXOID, REDUCED DIPHTHERIA TOXOID AND ACELLULAR PERTUSSIS VACCINE, ADSORBED 5; 2.5; 8; 8; 2.5 [IU]/.5ML; [IU]/.5ML; UG/.5ML; UG/.5ML; UG/.5ML
0.5 SUSPENSION INTRAMUSCULAR ONCE
Qty: 0 | Refills: 0 | Status: COMPLETED | OUTPATIENT
Start: 2019-04-20 | End: 2019-04-20

## 2019-04-20 RX ORDER — SODIUM CHLORIDE 9 MG/ML
3 INJECTION INTRAMUSCULAR; INTRAVENOUS; SUBCUTANEOUS ONCE
Qty: 0 | Refills: 0 | Status: COMPLETED | OUTPATIENT
Start: 2019-04-20 | End: 2019-04-20

## 2019-04-20 RX ADMIN — SODIUM CHLORIDE 3 MILLILITER(S): 9 INJECTION INTRAMUSCULAR; INTRAVENOUS; SUBCUTANEOUS at 17:45

## 2019-04-20 RX ADMIN — TETANUS TOXOID, REDUCED DIPHTHERIA TOXOID AND ACELLULAR PERTUSSIS VACCINE, ADSORBED 0.5 MILLILITER(S): 5; 2.5; 8; 8; 2.5 SUSPENSION INTRAMUSCULAR at 18:40

## 2019-04-20 NOTE — ED PROVIDER NOTE - SKIN, MLM
Skin normal color for race, warm, and dry. No evidence of rash. +2 cm laceration on the back head with some swelling

## 2019-04-20 NOTE — ED PROVIDER NOTE - CARE PLAN
Principal Discharge DX:	Minor head injury, initial encounter  Secondary Diagnosis:	Scalp laceration, initial encounter

## 2019-04-20 NOTE — ED ADULT NURSE NOTE - NSIMPLEMENTINTERV_GEN_ALL_ED
Implemented All Fall with Harm Risk Interventions:  [place] to call system. Call bell, personal items and telephone within reach. Instruct patient to call for assistance. Room bathroom lighting operational. Non-slip footwear when patient is off stretcher. Physically safe environment: no spills, clutter or unnecessary equipment. Stretcher in lowest position, wheels locked, appropriate side rails in place. Provide visual cue, wrist band, yellow gown, etc. Monitor gait and stability. Monitor for mental status changes and reorient to person, place, and time. Review medications for side effects contributing to fall risk. Reinforce activity limits and safety measures with patient and family. Provide visual clues: red socks.

## 2019-04-20 NOTE — ED PROVIDER NOTE - CLINICAL SUMMARY MEDICAL DECISION MAKING FREE TEXT BOX
Pt with ESRD on dialysis on Plavix and is here after mechanical fall with head injury. With trauma alert, CT head, and labs.

## 2019-04-20 NOTE — ED ADULT TRIAGE NOTE - CHIEF COMPLAINT QUOTE
pt BIBEMS from home s/p mechanical fall in kitchen onto back. + headstrike. small abrasion to back of head, dressing in place. pt a&ox3. on plavix/asa. trauma alert activated upon arrival. c/o lower back pain 4/10. no LOC.

## 2019-04-20 NOTE — ED PROVIDER NOTE - OBJECTIVE STATEMENT
89 y/o female with PMHx of CAD, CKD, HLD, HTN presents to ED c/o head pain and laceration s/p fall today. Pt was in the kitchen by the sink and slipped on the rug and feel backwards and hit her head. Denies LOC or any other pain. Was not able to get up on own and yelled out for sons to help her get up. Pt is on dialysis. Pt is taking Plavix, ASA, calcium, Toprol, Pravastatin. Doesn't remember last time she received a tetanus shot. Allergic to sulfa. Non smoker. No ETOH consumption. PCP: Dr. Rand

## 2019-08-17 NOTE — PATIENT PROFILE ADULT. - EXTENSIONS OF SELF_ADULT
Pt sitting up in staging recliner, no s/s distress, denies needs.     Alana Holloway, RN  08/17/19 1800    
Eyeglasses

## 2020-02-25 ENCOUNTER — EMERGENCY (EMERGENCY)
Facility: HOSPITAL | Age: 85
LOS: 0 days | Discharge: HOME | End: 2020-02-25
Attending: EMERGENCY MEDICINE
Payer: MEDICARE

## 2020-02-25 VITALS
TEMPERATURE: 98 F | DIASTOLIC BLOOD PRESSURE: 66 MMHG | OXYGEN SATURATION: 98 % | SYSTOLIC BLOOD PRESSURE: 152 MMHG | RESPIRATION RATE: 18 BRPM | HEART RATE: 70 BPM

## 2020-02-25 VITALS — WEIGHT: 95.02 LBS

## 2020-02-25 DIAGNOSIS — Z95.5 PRESENCE OF CORONARY ANGIOPLASTY IMPLANT AND GRAFT: Chronic | ICD-10-CM

## 2020-02-25 DIAGNOSIS — N18.6 END STAGE RENAL DISEASE: ICD-10-CM
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
## 2020-02-25 DIAGNOSIS — E78.5 HYPERLIPIDEMIA, UNSPECIFIED: ICD-10-CM

## 2020-02-25 DIAGNOSIS — Z79.02 LONG TERM (CURRENT) USE OF ANTITHROMBOTICS/ANTIPLATELETS: ICD-10-CM

## 2020-02-25 DIAGNOSIS — Z79.82 LONG TERM (CURRENT) USE OF ASPIRIN: ICD-10-CM

## 2020-02-25 DIAGNOSIS — Z90.721 ACQUIRED ABSENCE OF OVARIES, UNILATERAL: Chronic | ICD-10-CM

## 2020-02-25 DIAGNOSIS — Z88.2 ALLERGY STATUS TO SULFONAMIDES: ICD-10-CM

## 2020-02-25 DIAGNOSIS — Z98.890 OTHER SPECIFIED POSTPROCEDURAL STATES: Chronic | ICD-10-CM
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
## 2020-02-25 DIAGNOSIS — I25.10 ATHEROSCLEROTIC HEART DISEASE OF NATIVE CORONARY ARTERY WITHOUT ANGINA PECTORIS: ICD-10-CM

## 2020-02-25 DIAGNOSIS — I12.0 HYPERTENSIVE CHRONIC KIDNEY DISEASE WITH STAGE 5 CHRONIC KIDNEY DISEASE OR END STAGE RENAL DISEASE: ICD-10-CM

## 2020-02-25 DIAGNOSIS — S41.111A LACERATION WITHOUT FOREIGN BODY OF RIGHT UPPER ARM, INITIAL ENCOUNTER: ICD-10-CM

## 2020-02-25 DIAGNOSIS — Z99.2 DEPENDENCE ON RENAL DIALYSIS: ICD-10-CM

## 2020-02-25 LAB
ALBUMIN SERPL ELPH-MCNC: 3.3 G/DL — SIGNIFICANT CHANGE UP (ref 3.3–5)
ALP SERPL-CCNC: 89 U/L — SIGNIFICANT CHANGE UP (ref 40–120)
ALT FLD-CCNC: 17 U/L — SIGNIFICANT CHANGE UP (ref 12–78)
ANION GAP SERPL CALC-SCNC: 6 MMOL/L — SIGNIFICANT CHANGE UP (ref 5–17)
APTT BLD: 29.1 SEC — SIGNIFICANT CHANGE UP (ref 27.5–36.3)
AST SERPL-CCNC: 28 U/L — SIGNIFICANT CHANGE UP (ref 15–37)
BASOPHILS # BLD AUTO: 0.06 K/UL — SIGNIFICANT CHANGE UP (ref 0–0.2)
BASOPHILS NFR BLD AUTO: 0.8 % — SIGNIFICANT CHANGE UP (ref 0–2)
BILIRUB SERPL-MCNC: 0.4 MG/DL — SIGNIFICANT CHANGE UP (ref 0.2–1.2)
BUN SERPL-MCNC: 20 MG/DL — SIGNIFICANT CHANGE UP (ref 7–23)
CALCIUM SERPL-MCNC: 8.6 MG/DL — SIGNIFICANT CHANGE UP (ref 8.5–10.1)
CHLORIDE SERPL-SCNC: 99 MMOL/L — SIGNIFICANT CHANGE UP (ref 96–108)
CO2 SERPL-SCNC: 32 MMOL/L — HIGH (ref 22–31)
CREAT SERPL-MCNC: 2.74 MG/DL — HIGH (ref 0.5–1.3)
EOSINOPHIL # BLD AUTO: 0 K/UL — SIGNIFICANT CHANGE UP (ref 0–0.5)
EOSINOPHIL NFR BLD AUTO: 0 % — SIGNIFICANT CHANGE UP (ref 0–6)
GLUCOSE SERPL-MCNC: 140 MG/DL — HIGH (ref 70–99)
HCT VFR BLD CALC: 38.2 % — SIGNIFICANT CHANGE UP (ref 34.5–45)
HGB BLD-MCNC: 12.3 G/DL — SIGNIFICANT CHANGE UP (ref 11.5–15.5)
IMM GRANULOCYTES NFR BLD AUTO: 0.4 % — SIGNIFICANT CHANGE UP (ref 0–1.5)
INR BLD: 0.99 RATIO — SIGNIFICANT CHANGE UP (ref 0.88–1.16)
LYMPHOCYTES # BLD AUTO: 1.36 K/UL — SIGNIFICANT CHANGE UP (ref 1–3.3)
LYMPHOCYTES # BLD AUTO: 17.2 % — SIGNIFICANT CHANGE UP (ref 13–44)
MAGNESIUM SERPL-MCNC: 2.2 MG/DL — SIGNIFICANT CHANGE UP (ref 1.6–2.6)
MCHC RBC-ENTMCNC: 30 PG — SIGNIFICANT CHANGE UP (ref 27–34)
MCHC RBC-ENTMCNC: 32.2 GM/DL — SIGNIFICANT CHANGE UP (ref 32–36)
MCV RBC AUTO: 93.2 FL — SIGNIFICANT CHANGE UP (ref 80–100)
MONOCYTES # BLD AUTO: 0.86 K/UL — SIGNIFICANT CHANGE UP (ref 0–0.9)
MONOCYTES NFR BLD AUTO: 10.9 % — SIGNIFICANT CHANGE UP (ref 2–14)
NEUTROPHILS # BLD AUTO: 5.61 K/UL — SIGNIFICANT CHANGE UP (ref 1.8–7.4)
NEUTROPHILS NFR BLD AUTO: 70.7 % — SIGNIFICANT CHANGE UP (ref 43–77)
PHOSPHATE SERPL-MCNC: 2 MG/DL — LOW (ref 2.5–4.5)
PLATELET # BLD AUTO: 252 K/UL — SIGNIFICANT CHANGE UP (ref 150–400)
POTASSIUM SERPL-MCNC: 3.4 MMOL/L — LOW (ref 3.5–5.3)
POTASSIUM SERPL-SCNC: 3.4 MMOL/L — LOW (ref 3.5–5.3)
PROT SERPL-MCNC: 7.5 GM/DL — SIGNIFICANT CHANGE UP (ref 6–8.3)
PROTHROM AB SERPL-ACNC: 11 SEC — SIGNIFICANT CHANGE UP (ref 10–12.9)
RBC # BLD: 4.1 M/UL — SIGNIFICANT CHANGE UP (ref 3.8–5.2)
RBC # FLD: 15.3 % — HIGH (ref 10.3–14.5)
SODIUM SERPL-SCNC: 137 MMOL/L — SIGNIFICANT CHANGE UP (ref 135–145)
WBC # BLD: 7.92 K/UL — SIGNIFICANT CHANGE UP (ref 3.8–10.5)
WBC # FLD AUTO: 7.92 K/UL — SIGNIFICANT CHANGE UP (ref 3.8–10.5)

## 2020-02-25 PROCEDURE — 99283 EMERGENCY DEPT VISIT LOW MDM: CPT

## 2020-02-25 PROCEDURE — 73060 X-RAY EXAM OF HUMERUS: CPT | Mod: 26,RT

## 2020-02-25 PROCEDURE — 86901 BLOOD TYPING SEROLOGIC RH(D): CPT

## 2020-02-25 PROCEDURE — 85610 PROTHROMBIN TIME: CPT

## 2020-02-25 PROCEDURE — 86900 BLOOD TYPING SEROLOGIC ABO: CPT

## 2020-02-25 PROCEDURE — 85730 THROMBOPLASTIN TIME PARTIAL: CPT

## 2020-02-25 PROCEDURE — 36415 COLL VENOUS BLD VENIPUNCTURE: CPT

## 2020-02-25 PROCEDURE — 80053 COMPREHEN METABOLIC PANEL: CPT

## 2020-02-25 PROCEDURE — 85025 COMPLETE CBC W/AUTO DIFF WBC: CPT

## 2020-02-25 PROCEDURE — 86850 RBC ANTIBODY SCREEN: CPT

## 2020-02-25 PROCEDURE — 83735 ASSAY OF MAGNESIUM: CPT

## 2020-02-25 PROCEDURE — 73060 X-RAY EXAM OF HUMERUS: CPT | Mod: RT

## 2020-02-25 PROCEDURE — 99283 EMERGENCY DEPT VISIT LOW MDM: CPT | Mod: 25

## 2020-02-25 PROCEDURE — 84100 ASSAY OF PHOSPHORUS: CPT

## 2020-02-25 NOTE — ED ADULT TRIAGE NOTE - WEIGHT METHOD
Plan of care:  Follow up with [facility] as discussed  Clinic contact information:  1. To schedule an appointment call [phone], option 1  2. To talk to the Triage RN call [phone], option 3  3. If you need to speak to Leila or get a message to your doctor on a Friday, call the triage RN  4. LeilaRN: [phone]  5. Surgery scheduling:      Mary Guerin: [phone]      Aurelia Walker: [phone]  6. Fax: [phone]  7. [phone]    
stated

## 2020-02-25 NOTE — ED PROVIDER NOTE - CARDIAC, MLM
Normal rate, regular rhythm.  Heart sounds S1, S2.  No murmurs, rubs or gallops.  right upper extremity with good thrill.

## 2020-02-25 NOTE — ED ADULT NURSE NOTE - OBJECTIVE STATEMENT
Patient brought in by sons for skin tear that is bleeding on right arm. Patient fell 2 days ago which caused skin tear. Patient's son dressed wound and bleeding stopped. today patient went to dialysis and they took off dressing to access AVF because AVF is directly above skin tear on arm. skin tear began bleeding again and did not stop because patient is on blood thinners dialysis center recommended sons take patient to ER. quick clot placed on skin tear with gauze to stop bleeding by Dr. Ceballos. labs drawn and sent to lab. plan for xray and lab results and continued wound care.

## 2020-02-25 NOTE — ED ADULT TRIAGE NOTE - CHIEF COMPLAINT QUOTE
as per  pt fell two days ago cutting her right arm below the fistula. pt was not seen by an MD,  states he wrapped the wound and when pt when to dialysis today they advised them  to go to ED because they could not controll the bleeding. pt is on anticoagulation therapy. dialysis was not performed

## 2020-02-25 NOTE — ED PROVIDER NOTE - OBJECTIVE STATEMENT
89 y/o female with PMHx of CAD, ESRD on HD Tue, Thu, Sat, HLD, HTN, presents with CC wound right arm.  Pt fell 2 days ago, sustained a skin tear to right upper arm.  Has been bleeding on and off.  Went to HD today, got full dialysis, but was told to get wound evaluated.  Went to medicenter and was told to come to ED for further evaluation.  Denies any other complaints at this time.  Patient on ASA and Plavix.

## 2021-01-20 NOTE — PROVIDER CONTACT NOTE (OTHER) - ACTION/TREATMENT ORDERED:
He can come in person  We can go over his PE and DVT  We will need to schedule repeat scans to see is clots are improving  
PA aware, and came to change dressing
PA will come and re- do dressing.

## 2021-05-03 NOTE — ED ADULT TRIAGE NOTE - BANDS:
Current and Past Psychiatric Diagnoses/Presenting Symptoms/Treatment Related Factors/Activating Events/Stressors
Fall Risk;
done

## 2021-05-04 NOTE — ED PROVIDER NOTE - ENMT, MLM
Airway patent, Nasal mucosa clear. Mouth with normal mucosa. Throat has no vesicles, no oropharyngeal exudates and uvula is midline.
04-May-2021 08:40

## 2021-05-23 NOTE — ED PROVIDER NOTE - CPE EDP HEAD NORM PED
[facility] Hospitalist Progress Note    Subjective   Seen and examined.  No acute events overnight.  No events on telemetry overnight.  Pain in legs improved.    Objective     I/O's    Intake/Output Summary (Last 24 hours) at 5/23/2021 1818  Last data filed at 5/23/2021 1653  Gross per 24 hour   Intake --   Output 650 ml   Net -650 ml       Last Recorded Vitals  Vitals with min/max:      Vital Last Value 24 Hour Range   Temperature 97.9 °F (36.6 °C) (05/23/21 1419) Temp  Min: 97.7 °F (36.5 °C)  Max: 99.1 °F (37.3 °C)   Pulse 71 (05/23/21 1419) Pulse  Min: 59  Max: 85   Respiratory 16 (05/23/21 1746) Resp  Min: 12  Max: 18   Non-Invasive  Blood Pressure 123/63 (05/23/21 1419) BP  Min: 123/63  Max: 125/67   Pulse Oximetry 95 % (05/23/21 1419) SpO2  Min: 91 %  Max: 95 %   Arterial   Blood Pressure   No data recorded        Physical Exam   Vitals reviewed.   Constitutional:       General: He is not in acute distress.     Appearance: He is not diaphoretic.   HENT:      Head: Normocephalic.      Mouth/Throat:      Mouth: Mucous membranes are moist.   Eyes:      Extraocular Movements: Extraocular movements intact.      Conjunctiva/sclera: Conjunctivae normal.      Pupils: Pupils are equal, round, and reactive to light.   Cardiovascular:      Rate and Rhythm: Normal rate and regular rhythm.      Heart sounds: No murmur.   Pulmonary:      Effort: Pulmonary effort is normal.      Breath sounds: Normal breath sounds. No wheezing.   Abdominal:      General: Bowel sounds are normal. There is no distension.      Palpations: Abdomen is soft.      Tenderness: There is no abdominal tenderness.   Musculoskeletal:         General: Normal range of motion.      Cervical back: Normal range of motion and neck supple.   Skin:     General: Skin is warm and dry.      Comments: Scattered skin tears and erosions on back, large erosion on buttocks and intergluteal cleft without significant induration or purulence, scattered erosions on lower  extremities with weeping, erythema from the toes up to the calves, shallow ulcers on feet   Neurological:      Mental Status: He is alert and oriented to person, place, and time.      Cranial Nerves: No cranial nerve deficit.      Sensory: No sensory deficit.   Psychiatric:         Behavior: Behavior normal.     Labs   Admission on 05/21/2021   Component Date Value Ref Range Status   • ABO/RH(D) 05/21/2021 AB Rh Negative   Final   • Culture, Blood or Bone Marrow 05/21/2021 No Growth 1 Day.   Preliminary   • Culture, Blood or Bone Marrow 05/21/2021 No Growth 1 Day.   Preliminary   • Sodium 05/21/2021 139  135 - 145 mmol/L Final   • Potassium 05/21/2021 4.0  3.4 - 5.1 mmol/L Final   • Chloride 05/21/2021 104  98 - 107 mmol/L Final   • Carbon Dioxide 05/21/2021 29  21 - 32 mmol/L Final   • Anion Gap 05/21/2021 10  10 - 20 mmol/L Final   • Glucose 05/21/2021 95  65 - 99 mg/dL Final   • BUN 05/21/2021 35* 6 - 20 mg/dL Final   • Creatinine 05/21/2021 1.64* 0.67 - 1.17 mg/dL Final   • Glomerular Filtration Rate 05/21/2021 42* >90 mL/min/1.73m2 Final    eGFR 30-59 mL/min/1.73m2 = Moderate decrease in kidney function. Stage 3 CKD (chronic kidney disease) or moderate kidney disease.   • BUN/ Creatinine Ratio 05/21/2021 21  7 - 25 Final   • Calcium 05/21/2021 9.0  8.4 - 10.2 mg/dL Final   • Bilirubin, Total 05/21/2021 0.6  0.2 - 1.0 mg/dL Final   • GOT/AST 05/21/2021 76* <=37 Units/L Final   • GPT/ALT 05/21/2021 34  <64 Units/L Final   • Alkaline Phosphatase 05/21/2021 49  45 - 117 Units/L Final   • Albumin 05/21/2021 2.8* 3.6 - 5.1 g/dL Final   • Protein, Total 05/21/2021 7.1  6.4 - 8.2 g/dL Final   • Globulin 05/21/2021 4.3* 2.0 - 4.0 g/dL Final   • A/G Ratio 05/21/2021 0.7* 1.0 - 2.4 Final   • PTT 05/21/2021 32* 22 - 30 sec Final   • NT-proBNP 05/21/2021 63  <=125 pg/mL Final   • Lactate, Venous 05/21/2021 1.2  0.0 - 2.0 mmol/L Final   • Ventricular Rate EKG/Min (BPM) 05/21/2021 61   Final   • Atrial Rate (BPM) 05/21/2021  64   Final   • MA-Interval (MSEC) 05/21/2021 172   Final   • QRS-Interval (MSEC) 05/21/2021 110   Final   • QT-Interval (MSEC) 05/21/2021 450   Final   • QTc 05/21/2021 453   Final   • P Axis (Degrees) 05/21/2021 24   Final   • R Axis (Degrees) 05/21/2021 98   Final   • T Axis (Degrees) 05/21/2021 72   Final   • REPORT TEXT 05/21/2021    Final                    Value:Normal sinus rhythm  Rightward axis  Nonspecific ST abnormality  Abnormal ECG  No previous ECGs available  Confirmed by TONA WEBSTER MD ([id number]) on 5/21/2021 10:24:56 PM     • Prothrombin Time 05/21/2021 11.8  9.7 - 11.8 sec Final   • INR 05/21/2021 1.1  <=5.0 Final    INR Therapeutic Range: 2.0 to 3.0 (2.5 to 3.5 recommended for recurrent thrombotic episodes and mechanical prosthetic heart valves.)   • Troponin I, Ultra Sensitive 05/21/2021 <0.02  <=0.04 ng/mL Final   • ABO/RH(D) 05/21/2021 AB Rh Negative   Final   • ANTIBODY SCREEN 05/21/2021 Negative   Final   • TYPE AND SCREEN EXPIRATION DATE 05/21/2021 05/24/2021 23:59   Final   • COLOR, URINALYSIS 05/21/2021 Karla   Final   • APPEARANCE, URINALYSIS 05/21/2021 Hazy   Final   • GLUCOSE, URINALYSIS 05/21/2021 Negative  Negative mg/dL Final   • BILIRUBIN, URINALYSIS 05/21/2021 Negative  Negative Final   • KETONES, URINALYSIS 05/21/2021 Negative  Negative mg/dL Final   • SPECIFIC GRAVITY, URINALYSIS 05/21/2021 1.025  1.005 - 1.030 Final   • OCCULT BLOOD, URINALYSIS 05/21/2021 Small* Negative Final   • PH, URINALYSIS 05/21/2021 5.0  5.0 - 7.0 Final   • PROTEIN, URINALYSIS 05/21/2021 30 * Negative mg/dL Final   • UROBILINOGEN, URINALYSIS 05/21/2021 2.0* 0.2, 1.0 mg/dL Final   • NITRITE, URINALYSIS 05/21/2021 Negative  Negative Final   • LEUKOCYTE ESTERASE, URINALYSIS 05/21/2021 Negative  Negative Final   • SQUAMOUS EPITHELIAL, URINALYSIS 05/21/2021 1 to 5  None Seen, 1 to 5 /hpf Final   • ERYTHROCYTES, URINALYSIS 05/21/2021 1 to 2  None Seen, 1 to 2 /hpf Final   • LEUKOCYTES, URINALYSIS 05/21/2021 6 to  10* None Seen, 1 to 5 /hpf Final   • BACTERIA, URINALYSIS 05/21/2021 Few* None Seen /hpf Final   • HYALINE CASTS, URINALYSIS 05/21/2021 None Seen  None Seen, 1 to 5 /lpf Final   • MUCUS 05/21/2021 Present   Final   • WBC 05/21/2021 11.5* 4.2 - 11.0 K/mcL Final   • RBC 05/21/2021 4.42* 4.50 - 5.90 mil/mcL Final   • HGB 05/21/2021 10.6* 13.0 - 17.0 g/dL Final   • HCT 05/21/2021 34.7* 39.0 - 51.0 % Final   • MCV 05/21/2021 78.5  78.0 - 100.0 fl Final   • MCH 05/21/2021 24.0* 26.0 - 34.0 pg Final   • MCHC 05/21/2021 30.5* 32.0 - 36.5 g/dL Final   • RDW-CV 05/21/2021 18.1* 11.0 - 15.0 % Final   • RDW-SD 05/21/2021 51.9* 39.0 - 50.0 fL Final   • PLT 05/21/2021 218  140 - 450 K/mcL Final   • NRBC 05/21/2021 0  <=0 /100 WBC Final   • Neutrophil, Percent 05/21/2021 79  % Final   • Lymphocytes, Percent 05/21/2021 10  % Final   • Mono, Percent 05/21/2021 8  % Final   • Eosinophils, Percent 05/21/2021 2  % Final   • Basophils, Percent 05/21/2021 0  % Final   • Immature Granulocytes 05/21/2021 1  % Final   • Absolute Neutrophils 05/21/2021 9.0* 1.8 - 7.7 K/mcL Final   • Absolute Lymphocytes 05/21/2021 1.2  1.0 - 4.0 K/mcL Final   • Absolute Monocytes 05/21/2021 1.0* 0.3 - 0.9 K/mcL Final   • Absolute Eosinophils  05/21/2021 0.2  0.0 - 0.5 K/mcL Final   • Absolute Basophils 05/21/2021 0.1  0.0 - 0.3 K/mcL Final   • Absolute Immmature Granulocytes 05/21/2021 0.1  0.0 - 0.2 K/mcL Final   • Extra Tube 05/21/2021 Hold for Add Ons   Final   • Extra Tube 05/21/2021 Hold for Add Ons   Final   • Extra Tube 05/21/2021 Hold for Add Ons   Final   • CK 05/21/2021 2,727* 39 - 308 Units/L Final   • Rapid SARS-COV-2 by PCR 05/21/2021 Not Detected  Not Detected / Detected / Presumptive Positive / Inhibitors present Final   • Isolation Guidelines 05/21/2021    Final    Do not use this test result as the sole decision-maker for discontinuation of isolation.   Clinical evaluation should be considered for other respiratory illness requiring  transmission-based isolation.    •       No fever (<99.0 F/37.2 C) for at least 24 hours without the use of fever-reducing medications    AND  •       Respiratory symptoms have improved or resolved (e.g. cough, shortness of breath)     AND  •       COVID-19 negative test    See COVID-19 Deisolation Resource Guide   • Procedural Comment 05/21/2021    Final    SARS-CoV-2 nucleic acid has not been detected indicating the absence of COVID-19.       Testing was performed using the [employer] Xpert Xpress SARS-CoV-2 RT-PCR assay that has been given Emergency Use Authorization (EUA) by the [place] Food and Drug Administration (FDA).  These results are considered definitive and do not need to be confirmed by another method.   • Sodium 05/22/2021 141  135 - 145 mmol/L Final   • Potassium 05/22/2021 3.7  3.4 - 5.1 mmol/L Final   • Chloride 05/22/2021 107  98 - 107 mmol/L Final   • Carbon Dioxide 05/22/2021 29  21 - 32 mmol/L Final   • Anion Gap 05/22/2021 9* 10 - 20 mmol/L Final   • Glucose 05/22/2021 95  65 - 99 mg/dL Final   • BUN 05/22/2021 27* 6 - 20 mg/dL Final   • Creatinine 05/22/2021 1.13  0.67 - 1.17 mg/dL Final   • Glomerular Filtration Rate 05/22/2021 66* >90 mL/min/1.73m2 Final    eGFR 60 - 89 mL/min/1.73m2 = Mild decrease in kidney function.   • BUN/ Creatinine Ratio 05/22/2021 24  7 - 25 Final   • Calcium 05/22/2021 8.4  8.4 - 10.2 mg/dL Final   • WBC 05/22/2021 9.3  4.2 - 11.0 K/mcL Final   • RBC 05/22/2021 3.84* 4.50 - 5.90 mil/mcL Final   • HGB 05/22/2021 9.3* 13.0 - 17.0 g/dL Final   • HCT 05/22/2021 30.0* 39.0 - 51.0 % Final   • MCV 05/22/2021 78.1  78.0 - 100.0 fl Final   • MCH 05/22/2021 24.2* 26.0 - 34.0 pg Final   • MCHC 05/22/2021 31.0* 32.0 - 36.5 g/dL Final   • RDW-CV 05/22/2021 18.1* 11.0 - 15.0 % Final   • RDW-SD 05/22/2021 51.9* 39.0 - 50.0 fL Final   • PLT 05/22/2021 181  140 - 450 K/mcL Final   • NRBC 05/22/2021 0  <=0 /100 WBC Final   • Neutrophil, Percent 05/22/2021 79  % Final   •  Lymphocytes, Percent 05/22/2021 11  % Final   • Mono, Percent 05/22/2021 7  % Final   • Eosinophils, Percent 05/22/2021 2  % Final   • Basophils, Percent 05/22/2021 1  % Final   • Immature Granulocytes 05/22/2021 0  % Final   • Absolute Neutrophils 05/22/2021 7.3  1.8 - 7.7 K/mcL Final   • Absolute Lymphocytes 05/22/2021 1.1  1.0 - 4.0 K/mcL Final   • Absolute Monocytes 05/22/2021 0.6  0.3 - 0.9 K/mcL Final   • Absolute Eosinophils  05/22/2021 0.2  0.0 - 0.5 K/mcL Final   • Absolute Basophils 05/22/2021 0.1  0.0 - 0.3 K/mcL Final   • Absolute Immmature Granulocytes 05/22/2021 0.0  0.0 - 0.2 K/mcL Final   • GLUCOSE, BEDSIDE - POINT OF CARE 05/22/2021 109* 70 - 99 mg/dL Final   • Ventricular Rate EKG/Min (BPM) 05/22/2021 77   Final   • Atrial Rate (BPM) 05/22/2021 77   Final   • CO-Interval (MSEC) 05/22/2021 200   Final   • QRS-Interval (MSEC) 05/22/2021 114   Final   • QT-Interval (MSEC) 05/22/2021 416   Final   • QTc 05/22/2021 471   Final   • P Axis (Degrees) 05/22/2021 26   Final   • R Axis (Degrees) 05/22/2021 94   Final   • T Axis (Degrees) 05/22/2021 63   Final   • REPORT TEXT 05/22/2021    Final                    Value:Sinus rhythm  with  blocked  premature atrial complexes  Otherwise normal ECG  When compared with ECG of  21-MAY-2021 17:23,  premature atrial complexes  are now  present  Confirmed by TONA WEBSTER MD ([id number]) on 5/23/2021 12:54:32 AM     • GLUCOSE, BEDSIDE - POINT OF CARE 05/22/2021 123* 70 - 99 mg/dL Final   • Sodium 05/23/2021 143  135 - 145 mmol/L Final   • Potassium 05/23/2021 3.7  3.4 - 5.1 mmol/L Final   • Chloride 05/23/2021 111* 98 - 107 mmol/L Final   • Carbon Dioxide 05/23/2021 27  21 - 32 mmol/L Final   • Anion Gap 05/23/2021 9* 10 - 20 mmol/L Final   • Glucose 05/23/2021 73  65 - 99 mg/dL Final   • BUN 05/23/2021 20  6 - 20 mg/dL Final   • Creatinine 05/23/2021 1.13  0.67 - 1.17 mg/dL Final   • Glomerular Filtration Rate 05/23/2021 66* >90 mL/min/1.73m2 Final    eGFR 60 - 89  mL/min/1.73m2 = Mild decrease in kidney function.   • BUN/ Creatinine Ratio 05/23/2021 18  7 - 25 Final   • Calcium 05/23/2021 8.2* 8.4 - 10.2 mg/dL Final   • Hemoglobin A1C 05/23/2021 5.8* 4.5 - 5.6 % Final      Diabetic Screening  Non Diabetic:             <5.7%  Increased Risk:           5.7-6.4%  Diagnostic For Diabetes:  >6.4%    Diabetic Control  A1C%       eAG mg/dL  6.0            126  6.5            140  7.0            154  7.5            169  8.0            183  8.5            197  9.0            212  9.5            226  10.0           240   • WBC 05/23/2021 6.4  4.2 - 11.0 K/mcL Final   • RBC 05/23/2021 3.74* 4.50 - 5.90 mil/mcL Final   • HGB 05/23/2021 9.0* 13.0 - 17.0 g/dL Final   • HCT 05/23/2021 30.1* 39.0 - 51.0 % Final   • MCV 05/23/2021 80.5  78.0 - 100.0 fl Final   • MCH 05/23/2021 24.1* 26.0 - 34.0 pg Final   • MCHC 05/23/2021 29.9* 32.0 - 36.5 g/dL Final   • PLT 05/23/2021 168  140 - 450 K/mcL Final   • RDW-CV 05/23/2021 17.8* 11.0 - 15.0 % Final   • RDW-SD 05/23/2021 52.6* 39.0 - 50.0 fL Final   • NRBC 05/23/2021 0  <=0 /100 WBC Final   • GLUCOSE, BEDSIDE - POINT OF CARE 05/23/2021 73  70 - 99 mg/dL Final        Imaging  LAST ECHO/ECHO STRESS:  No procedure found.    LAST MRI:  06/14/19   MRI [facility] PROCEDURE  Narrative  Accession #[id number]:1. Magnetic resonance imaging (MRI) of the brain without and with contrast2. Magnetic resonance angiography (MRA) of the head without contrastHISTORY: r/o CVA patient has basilar artery stentTECHNIQUE: MRI of the brain was performed prior to and following the uneventful administration of intravenous gadolinium contrast according to standard protocol. MRA of the head was performed without contrast utilizing time of flight technique.COMPARISON: CT brain from 07/11/2019Brain:No evidence of acute or chronic hemorrhage is identified. No evidence of acute cerebral infarction is seen. The ventricles are of normal size, shape, and  morphology. No mass effect or midline shift is seen. Periventricular and subcortical white matter FLAIR hyperintensities likely represent sequelae of chronic small vessel ischemic disease. No enhancing lesions are identified. The corpus callosum and sella appear normal. The posterior fossa, brainstem, and craniocervical junction appear normal.The visualized portions of the orbits, paranasal sinuses, and mastoids appear normal. Normal flow voids are demonstrated in the carotid arteries and basilar artery. The calvarium and visualized cervical spine appear normal.Angiographic findings:The distal internal carotid arteries appear normal. The anterior and middle cerebral arteries appear normal. The distal vertebral arteries appear normal.  There is fetal origin of the posterior cerebral artery.  Otherwise, the basilar artery and posterior cerebral arteries appear normal. No aneurysms, vascular occlusions, or intracranial stenoses are identified.  Impression  1. No acute intracranial abnormality.No flow-limiting stenosis within the intracranial vasculature.-----  F I N A L  -----Transcribed By: LINH 07/12/19 1:34 amDictated By:            CARSON WALLACE MDElectronically Reviewed and Approved By:           CARSON WALLACE MD  07/12/19 1:57 am     MRI [facility] PROCEDURE  Narrative  Accession #[id number]. Magnetic resonance imaging (MRI) of the brain without and with contrast2. Magnetic resonance angiography (MRA) of the head without contrastHISTORY: r/o CVA patient has basilar artery stentTECHNIQUE: MRI of the brain was performed prior to and following the uneventful administration of intravenous gadolinium contrast according to standard protocol. MRA of the head was performed without contrast utilizing time of flight technique.COMPARISON: CT brain from 07/11/2019Brain:No evidence of acute or chronic hemorrhage is identified. No evidence of acute cerebral infarction is seen. The  ventricles are of normal size, shape, and morphology. No mass effect or midline shift is seen. Periventricular and subcortical white matter FLAIR hyperintensities likely represent sequelae of chronic small vessel ischemic disease. No enhancing lesions are identified. The corpus callosum and sella appear normal. The posterior fossa, brainstem, and craniocervical junction appear normal.The visualized portions of the orbits, paranasal sinuses, and mastoids appear normal. Normal flow voids are demonstrated in the carotid arteries and basilar artery. The calvarium and visualized cervical spine appear normal.Angiographic findings:The distal internal carotid arteries appear normal. The anterior and middle cerebral arteries appear normal. The distal vertebral arteries appear normal.  There is fetal origin of the posterior cerebral artery.  Otherwise, the basilar artery and posterior cerebral arteries appear normal. No aneurysms, vascular occlusions, or intracranial stenoses are identified.  Impression  1. No acute intracranial abnormality.No flow-limiting stenosis within the intracranial vasculature.-----  F I N A L  -----Transcribed By: LINH 07/12/19 1:34 amDictated By:            CARSON WALLACE MDElectronically Reviewed and Approved By:           CARSON WALLACE MD  07/12/19 1:57 am    11/07/16     Narrative  Accession #[id number] DATE:  11/7/2016 3:45 PMPROCEDURE: MRI FEMUR RT WO/W CONCLINICAL INDICATION: Age:  64 years . Gender:  Male.Stated history: \" acute on chronic osteomyelitis\" Additional history: History of chronic osteomyelitis status post prior debridement.COMPARISON: MRI report 05/10/2007 and 12/03/2008, right femur radiographs 10/31/2013, right femur radiographs 11/05/2016, CT right lower extremity 11/04/2016TECHNIQUE:Pre and postcontrast multiplanar multisequence MRI of the right femur was performed. Approximately 18 mL MultiHance contrast were infused for the postcontrast portion of  exam.  Sequences include large field of view coronal T1, coronal STIR, and coronal T1 fat-sat postcontrast, as well as small field-of-view axial T1, axial T2 fat-sat, axial T1 fat-sat precontrast, axial T1 fat-sat postcontrast, and sagittal T2 and sagittal T1 fat-sat postcontrast.FINDINGS:Within the distal 3rd of the right femur involving the metaphysis and the diaphysis there is a lateral cortical defect measuring 7.1 cm in length.  This is accompanied by a slightly longer intramedullary cavity that extends over a 10.9 cm segment.  Findings are consistent with the history of prior osteomyelitis followed by debridement.  The inferior half of the intramedullary cavity is T2 hyperintense fluid signal equivalent while the superior half is more heterogeneously T2 hyperintense.  There is very minimal trace peripheral enhancement most prominently about the inferior half.  The majority the margins appear peripherally sclerotic with low signal intensity along the interface with the cavity and the bone.  Less prominent sclerosis is noted about the inferior aspect.  The adjacent bone itself is fairly unremarkable with sclerotic margins along the cavity, fairly homogenous T1 hyperintensity, and minimal bone marrow edema.There is a lateral nonenhancing low signal scar directly overlying the debridement changes.  No discrete enhancing sinus tract or soft tissue edema is identified.IMPRESSION:1.  Intraosseous fluid collection most prominently along the inferior half of the chronic distal femur defect is nonspecific with only trace peripheral enhancement.  Chronic intraosseous abscess is within the differential and aspiration/biopsy is recommended.  Other considerations include postoperative seroma/cystic change.  The adjacent bone itself is fairly unremarkable. No enhancing or fluid signal equivalent sinus tract. Findings were discussed with Dr. Mateusz Salgado at 7:49 AM on 11/08/2016*  F CHUY PERES  *Transcribed By: LINH 11/08/16 7:25  amDictated By:            HEBER SNOW MDElectronically Reviewed and Approved By:           HEBER SNOW MD  11/08/16 8:14 am    LAST CT:  06/14/19   CT [facility] PROCEDURE  Narrative  Accession #[id number]: CT HEAD OR BRAIN WO CONCLINICAL INDICATION:  67 years OLD Male WITH HISTORY OF left upper extremity weakness.COMPARISON: 4/12/15TECHNIQUE: 2.5 mm noncontrast axial images  obtained.  CT dose reduction technique was utilized.FINDINGS: No evidence of acute intracranial hemorrhage. No hydrocephalus .   Within the limits of this noncontrast study, There is no evidence of intracranial mass, mass effect, or midline shift.  No extra-axial fluid collections are seen.  Moderate generalized atrophy.  Moderate to extensive chronic small vessel ischemic changes.  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  Left frontal periventricular calcifications again noted.  Vascular calcification.  Stent in the basilar artery noted.  IntraStent stenosis cannot be excluded.  The gray-white matter differentiation is intact.No acute calvarial fracture is seen.The mastoid air cells are clear. The visualized paranasal sinuses are clear.  Impression  No evidence of acute intracranial hemorrhage, hydrocephalus, or midline shift.Moderate generalized atrophy.  Moderate to extensive chronic small vessel ischemic changes.  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  Left frontal periventricular calcifications again noted.  Vascular calcification.  Stent in the basilar artery noted.  IntraStent stenosis cannot be excluded.-----  F I N A L  -----Transcribed By: LINH 07/11/19 7:50 pmDictated By:            LISA CHANG MDElectronically Reviewed and Approved By:           LISA CHANG MD  07/11/19 7:54 pm    12/03/18   CT [facility] PROCEDURE  Narrative  Accession #[id number]: CT EXT LOWR RT W CONCLINICAL INDICATION: Osteomyelitis right leg painCOMPARISON:  None.TECHNIQUE: Axial contrast enhanced CT of the right leg  from the upper pelvis to the foot. Sagittal and coronal reformations obtained.CONTRAST: 80 cc of Omnipaque 300 are administered intravenously.FINDINGS: Visualized portion of the lower abdomen and pelvis demonstrate limited evaluation of bowel loops but otherwise unremarkable.  Urinary bladder demonstrates marked wall thickening raising suspicion of cystitis and should be correlated with the clinical symptoms and urinalysis if warranted.  Degenerative changes are evident in the right sacroiliac joint along with operative changes in the sacrum.  Sequelae of old fractures are noted in the right ilium and SI joint.  Right pelvic bones are grossly intact otherwise.  There is joint space narrowing at the hip with mild sclerosis.  The femur proximally demonstrates normal appearance of the femoral head although with subchondral cystic changes.  Proximal femur is intact.  In the distal femoral shaft extending to the metaphysis is a large cortical defect along the lateral margin extending into the endosteal and medullary canal.  The defect appears to be well-corticated and is relatively unchanged since previous examination.  There is no clear evidence of new ostial lysis, or osteopenia.  The defect extends to the femoral condyles and metaphysis. The soft tissue along the right distal thigh at the defect demonstrate extensive scarring but without clear evidence of abnormal enhancement, or fluid collection/abscess.  There is cutaneous thickening likely from scarring.  There is however a small joint effusion but is similar to previous examination and is very slightly decreased.  Patellofemoral joint space narrowing is also noted without significant change along with patellar, and condylar osteophytes.  The tibial plateau also demonstrates degenerative osteophytic changes with joint space narrowing is otherwise intact to the ankle.  Mild degenerative changes are noted of the joint space of the ankle, midfoot, and proximal  forefoot.  There is motion artifact in this region.  There is otherwise no clear evidence of cellulitis or new focus of osteomyelitis.The pelvic and lower extremity vasculature demonstrates significant atherosclerotic changes in the common iliac arteries, external and internal iliac branches with irregular stenoses. Similar process is demonstrated in the common femoral artery, superficial femoral, and profunda femoris branch with irregular atherosclerotic narrowing.  This extends to the level of the lower extremities in the calf branches.  Venous opacification is grossly normal without evidence of intravascular thrombus.  Impression  1.   No clear evidence of significant cellulitis, abscess, or recurrent osteomyelitis.  If there is clinical concern for cellulitis or osteomyelitis recommend evaluation with contrast MRI.2.  Extensive vascular disease, and degenerative changes.  Joint effusion in the knee, however cannot exclude septic arthritis*  F I N A L  Transcribed By: LINH 12/04/18 0:38 amDictated By:            MARCELLA STONE MDElectronically Reviewed and Approved By:           MARCELLA STONE MD  12/04/18 0:51 am    04/21/17     Narrative  Accession #[id number]: CTA CHEST, ABDOMEN AND PELVIS W CONCLINICAL INDICATION: Lung nodule, abdominal painCOMPARISON:  CT chest May 8, 2015 and a remote CT abdomen from November 8, 2011TECHNIQUE: CTA of the aorta was performed from the lung apices to the proximal femurs after intravenous 95 cc contrast followed by MIPS and post processed 3-D imagesFINDINGS:Thickened left ventricle. Normal-sized aorta throughout. There is mild calcified and noncalcified plaque of the descending thoracic aorta and infrarenal aorta. Normal arch vessels.No adenopathy. Stable lipomatous hypertrophy of the interatrial septa, benign variant. There is a stent in the right coronary arteryClear lungs. Normal pleura.Widely patent celiac, SMA, single right and left renal artery, JULIO CÉSAR. Mild  diffuse atherosclerotic disease of the common iliacs and external iliacs. No focal significant stenosisThe solid organs of the abdomen are within normal limits. Normal gallbladder with no biliary dilationNo mass, adenopathy or ascitesDiffuse stool retention in the colon. Focal wall thickening in the sigmoid colon which is distended with stool. Moderate diverticulosis of the sigmoid colon.Urinary bladder within normal limits.Neurostimulator pack in the right abdominal wall with the lead into the thoracic spineLaminectomy and interpedicular screws at L5-S1. There are no fractures or bone lesions.IMPRESSION:1. Thickened left ventricle.2. Normal caliber aorta and iliacs throughout with mild diffuse atherosclerotic disease but no focal significant stenosis. Widely patent mesenteric vasculature.3. No signs of pulmonary edema, infection, malignancy.4. The sigmoid colon is filled with stool and there is focal circumferential thickening of the sigmoid colon at a site that is well distended by stool which leads me to believe this is true wall thickening and not pseudo thickening due to underdistention. Given that there is sigmoid diverticulosis it is possible that this thickening is just a manifestation of chronic diverticulosis but colonic tumor should be excluded.*  F I N A L  *Transcribed By: LINH 04/21/17 2:38 pmDictated By:            GLORIA FRENCH MDElectronically Reviewed and Approved By:           GLORIA FRENCH MD  04/21/17 2:54 pmCOMMENT Result Annotated 04/23/2017 09:34 by JULIEN MENARD:  CT CHEST ABDOMEN AND PELVIS SHOWS CLEAR LUNGS.MILD THICKENING OF HEART.LOT OF STOOL IN COLON BT ALSO THERE IS SOMETHICKENING OF LOWER COLON.I SUGGEST TO ARRANGE TO SEE GASTROENTEROLOGY FOR EVALUATION.FOR NOW PLEASE USE OTC MIRALAX DAILY TO HELP CLEAR THE STOOL IN COLON. COMMENT    LAST EKG:  Encounter Date: 05/21/21   Electrocardiogram 12-Lead   Result Value    Ventricular Rate EKG/Min (BPM) 77    Atrial Rate (BPM) 77     SD-Interval (MSEC) 200    QRS-Interval (MSEC) 114    QT-Interval (MSEC) 416    QTc 471    P Axis (Degrees) 26    R Axis (Degrees) 94    T Axis (Degrees) 63    REPORT TEXT      Sinus rhythm  with  blocked  premature atrial complexes  Otherwise normal ECG  When compared with ECG of  21-MAY-2021 17:23,  premature atrial complexes  are now  present  Confirmed by TONA WEBSTER MD ([id number]) on 5/23/2021 12:54:32 AM         LAST X-RAY:  05/21/21   XR FOOT 2 VIEWS LEFT  Narrative  EXAM DATE:  5/23/2021 12:34 PMPROCEDURE: XR FOOT 2 VIEWS LEFTCLINICAL INDICATION: Age:  69 years . Gender:  Male. Stated history: \" \" Additional history:  None., left 5th plantar MTP ulcer COMPARISON: Left foot radiograph 05/17/2012TECHNIQUE:  AP and lateral left foot radiographs, two viewsFINDINGS:  Soft tissue swelling is noted along the lateral aspect of the 5th MTPjoint.  No discrete erosion is identified.  Osteopenia does limitsensitivity for erosions.  There is mild osteoarthritis of the 1st MTP andIP joints with joint space narrowing.  Small distal Achilles tendonenthesophyte.  No fracture or periosteal reaction.  Impression  1.    Soft tissue swelling adjacent to the 5th MTP joint without evidenceof osteomyelitis 2.    Osteopenia.  Other findings discussed above.Electronically Signed by: HEBER NAYLOR MD Signed on: 5/23/2021 1:04 PM      XR KNEE 3 VIEWS RIGHT  Narrative  EXAM DATE:  5/22/2021 7:58 AMPROCEDURE: XR KNEE 3 VIEWS RIGHTCLINICAL INDICATION: Age:  69 years . Gender:  Male. Stated history: \" \" Additional history:  None., Knee pain COMPARISON: Right knee radiographs 07/03/2020, right femur tuzavjwjwpr41/05/2018, radiographs 11/05/2016TECHNIQUE:  AP, oblique, lateral radiographs of the right knee, three viewsFINDINGS:  Osteopenia.  Tibiofemoral joint spaces are mild to moderately narrowedwith small marginal osteophytes.  Mild patellofemoral joint space narrowingwith moderate patella femoral osteophytes.  Trace effusion.   Moderateatheromatous consultations of the popliteal artery.There is an unchanged cylindrical bone lesion in the distal femoralmetadiaphysis, lucent centrally with a rim of peripheral sclerosis, as wellas lobulated sclerosis more distally.  This is unchanged in size measuringapproximately 14.4 cm subtle in length.  No new erosive changes oraggressive appearing periosteal reaction.  This may represent sequela ofold osteomyelitis or old bone infarct.  Impression  1.    Mild to moderate tricompartmental osteoarthritis of the right kneegreatest in the patellofemoral compartment.  2.   No change in appearance of the cylindrical osteolytic bone lesion inthe distal femur with peripheral sclerosis dating back to 2016.  This mayrepresent sequela of old healed osteomyelitis, less likely old avascularnecrosis.3.   Osteopenia.Electronically Signed by: HEBER NAYLOR MD Signed on: 5/23/2021 9:05 AM      XR CHEST PA OR AP 1 VIEW  Narrative  EXAM: XR CHEST PA OR AP 1 VIEWCLINICAL INDICATION: Chest painCOMPARISON: 04/03/2021.FINDINGS: Lung fields are grossly clear bilaterally.  No definite pleuraleffusion or pneumothorax.  Heart is mildly enlarged.  Impression  Mild cardiomegaly.  No radiographic evidence of acutecardiopulmonary process.Electronically Signed by: CORBIN KAY MD Signed on: 5/21/2021 5:24 PM     LAST U/S:  11/03/16     Narrative  Accession #[id number] DATE:  11/3/2016 1:04 PMPROCEDURE: US EXT LOWR NON-VASCULAR LTD RTCLINICAL INDICATION: Age:  64 years . Gender:  Male.Stated history: \" ACUTE CHRONIC OSTIAL MYELITIS RT FEMUR\" Additional history: Right lower extremity abscessCOMPARISON: Right knee radiographs 02/06/2015TECHNIQUE:Multiple grayscale ultrasound images were obtained on the right lateral thigh in the area the patient's palpable complaint by the ultrasound technologist.FINDINGS:No discrete soft tissue mass or fluid collection is identified.  There is a small subcentimeter hypoechoic region in the skin  that corresponds to a scar per the ultrasound technologist.The underlying muscles are unremarkable.Limited images of the right femur demonstrate no discrete cortical defect.  Assessment of the bone is limited by ultrasound technique.IMPRESSION:1.  No discrete soft tissue mass or fluid collection is identified in the visualized right thigh.  If there is persistent clinical concern for abscess or osteomyelitis, advise an MRI with contrast unless contraindicated.*  F I N A L  *Transcribed By: LINH 11/03/16 2:19 pmDictated By:            MARK SNOW MDElectronically Reviewed and Approved By:           GWENDOLYN, MARK SAMUEL MD  11/03/16 2:24 pm    CXR personally reviewed  XR FOOT 2 VIEWS LEFT   Final Result by Mark Christian MD (05/23 6019)   1.    Soft tissue swelling adjacent to the 5th MTP joint without evidence   of osteomyelitis    2.    Osteopenia.  Other findings discussed above.         Electronically Signed by: MARK CHRISTIAN MD    Signed on: 5/23/2021 1:04 PM          XR KNEE 3 VIEWS RIGHT   Final Result by Mark Crhistian MD (05/23 5507)   1.    Mild to moderate tricompartmental osteoarthritis of the right knee   greatest in the patellofemoral compartment.     2.   No change in appearance of the cylindrical osteolytic bone lesion in   the distal femur with peripheral sclerosis dating back to 2016.  This may   represent sequela of old healed osteomyelitis, less likely old avascular   necrosis.   3.   Osteopenia.         Electronically Signed by: MARK CHRISTIAN MD    Signed on: 5/23/2021 9:05 AM          XR CHEST PA OR AP 1 VIEW   Final Result by Emeka Kay MD (05/21 2525)   Mild cardiomegaly.  No radiographic evidence of acute   cardiopulmonary process.      Electronically Signed by: EMEKA KAY MD    Signed on: 5/21/2021 5:24 PM          US MARIA R Multiple Levels No Stress    (Results Pending)         Assessment  69 year old male w/PMH of diabetes mellitus type 2, CAD, CVA x3 now wheelchair-bound, CHF, COPD  presented to [facility] for urinary retention, skin breakdown.       #Wounds on back and lower extremities  No clear sign of infection.  Discontinued vancomycin and Zosyn.  Await blood cultures  Wound care physician following.  Appreciate recommendations.  X-ray left foot shows soft tissue swelling adjacent to fifth MTP without osteomyelitis  PT/OT for safe transfers and potential placement  Follow-up arterial ultrasound lower extremities     #Chronic venous stasis dermatitis with ulcers  Less likely cellulitis     #Hypovolemia  #SIRS  Present on admission.  Less likely that skin is the source of infection.  UTI not suspicious for UTI.  Leukocytosis resolved.  Hypokalemia resolved with fluids.  Follow-up blood cultures  Resolved     #MALGORZATA on CKD stage III  MALGORZATA resolved with IV fluids  Fluids discontinued     #Urinary retention  States he has not urinated in over 1 week.  Dozier placed in ED.  No documentation of matter change.  Continue Flomax  Urology following.  Appreciate recommendations.  Urology recommends maintaining catheter at discharge for TAV in 1 to 2 weeks.  Follow-up in clinic.     #Diabetes mellitus type 2  Continue low-dose sliding scale     #History of CVA  #CAD  #Hyperlipidemia  #Chronic HFpEF  Continue aspirin, atorvastatin, fenofibrate     #Chronic right knee pain  X-ray last July shows no acute findings.  Evidence of moderate tricompartmental joint space narrowing with osteophytosis  X-rays show osteoarthritis.  Can consider advanced imaging on an outpatient basis.     DVT prophylaxis: Heparin subcu  Code Status: Full let.  Discussed with patient.  Primary Care Physician: Analilia Ellison MD informed of patient's admission via epic messaging  Discussed with RN    Current Facility-Administered Medications   Medication Dose Route Frequency Provider Last Rate Last Admin   • HYDROcodone-acetaminophen (NORCO) 7.5-325 MG per tablet 1 tablet  1 tablet Oral Q4H PRN Myles Alves MD   1 tablet at 05/23/21 1425    • heparin (porcine) injection 5,000 Units  5,000 Units Subcutaneous 3 times per day Jose Portillo MD   5,000 Units at 05/23/21 1529   • fentaNYL (SUBLIMAZE) injection 25 mcg  25 mcg Intravenous Once Marcel Pittman       • lidocaine 2% urethral (UROJET) 2 % jelly 10 mL  10 mL Transurethral Once PRN Marcel Pittman       • acetaminophen (TYLENOL) tablet 650 mg  650 mg Oral Q4H PRN Myles Alves MD   650 mg at 05/22/21 0834   • dextrose 50 % injection 25 g  25 g Intravenous PRN Myles Alves MD       • dextrose 50 % injection 12.5 g  12.5 g Intravenous PRN Myles Alves MD       • glucagon (GLUCAGEN) injection 1 mg  1 mg Intramuscular PRN Myles Alves MD       • dextrose (GLUTOSE) 40 % gel 15 g  15 g Oral PRN Myles Alves MD       • dextrose (GLUTOSE) 40 % gel 30 g  30 g Oral PRN Myles Alves MD       • insulin lispro (ADMELOG,HumaLOG) scheduled AND correction dose   Subcutaneous TID WC Myles Alves MD   Stopped at 05/22/21 0744   • aspirin chewable 81 mg  81 mg Oral Daily Myles Alves MD   81 mg at 05/23/21 0919   • atorvastatin (LIPITOR) tablet 40 mg  40 mg Oral Daily Myles Alves MD   40 mg at 05/22/21 0834   • DULoxetine (CYMBALTA) capsule 60 mg  60 mg Oral BID Myles Alves MD   60 mg at 05/23/21 0919   • fenofibrate micronized (LOFIBRA) capsule 134 mg  134 mg Oral Daily with breakfast Myles Alves MD   134 mg at 05/23/21 0919   • latanoprost (XALATAN) 0.005 % ophthalmic solution 1 drop  1 drop Right Eye Nightly Myles Alves MD   1 drop at 05/22/21 2329   • pantoprazole (PROTONIX) EC tablet 40 mg  40 mg Oral Daily Myles Alves MD   40 mg at 05/23/21 0919   • pregabalin (LYRICA) capsule 300 mg  300 mg Oral BID Myles Alves MD   300 mg at 05/23/21 0920   • tamsulosin (FLOMAX) capsule 0.4 mg  0.4 mg Oral Daily PC Myles Alves MD   0.4 mg at 05/23/21 0919   • traZODone (DESYREL) tablet 150 mg  150 mg Oral Nightly Myles Alves MD   150 mg at 05/22/21 2041        Jose Portillo,  MD  5/23/2021             Head atraumatic, normal cephalic shape.

## 2021-07-13 NOTE — ED ADULT TRIAGE NOTE - WEIGHT IN KG
BPH (benign prostatic hyperplasia)  s/p SPC  CAD (coronary artery disease)    DM (diabetes mellitus)    Emphysema/COPD    ESRD (end stage renal disease)    GERD (gastroesophageal reflux disease)    HF (heart failure)    HTN (hypertension)    Melanoma    
43.1

## 2022-01-01 NOTE — INPATIENT CERTIFICATION FOR MEDICARE PATIENTS - THE STATUS OF COMORBIDITIES.
"Patient arrived with mother and family. Mother stated over the last week they noticed increased "" sigh breathing\"" and increased \""rate of breathing\"". Patient did have since  breathing since birth. They did try to contact primary care but have not received a call back. No fevers, coughs or other complaints noted at this time. Child appears well and in no obvious distress to writer.   " 2. The status of comorbities. (See ED/admit documents)

## 2022-05-12 NOTE — PROGRESS NOTE ADULT - SUBJECTIVE AND OBJECTIVE BOX
CC- s/p mechanical fall    HPI:  89 y/o female with HTN, CAD s/p PCI (br dr ceballos), CKD 5 on RRT (dr alford) and HL presents with L hip pain following mech fall last night.  No CP or SOB or palpitations.  On my exam, slightly hypertensive, NAD, awake and alert.  + L hip pain.  She gets HD T, Thurs and Sat.  Seen by ortho.  Dr ceballos service called--covered by dr lazo.  ECG--NSR L axis.  Admission CXR--no infiltrates/cardiomegaly   MOLST FORM COMPLETED--DNI/DNR (10 Feb 2019 11:11)    19- doing good today  19 OOB to chair, no acute complaints    Review of system- All 10 systems reviewed and is as per HPI otherwise negative.     Vital Signs Last 24 Hrs  T(C): 36.1 (2019 11:21), Max: 36.8 (2019 06:20)  T(F): 97 (2019 11:21), Max: 98.2 (2019 06:20)  HR: 70 (2019 12:49) (60 - 74)  BP: 113/57 (2019 12:49) (93/54 - 139/62)  BP(mean): --  RR: 16 (2019 12:49) (16 - 18)  SpO2: 98% (2019 10:49) (93% - 98%)    LABS:                                 9.8    10.62 )-----------( 180      ( 2019 09:54 )             30.1     2019 09:54    136    |  102    |  57     ----------------------------<  114    4.3     |  24     |  5.37     Ca    7.0        2019 09:54  Phos  5.1       2019 09:54    TPro  x      /  Alb  2.1    /  TBili  x      /  DBili  x      /  AST  x      /  ALT  x      /  AlkPhos  x      2019 09:54    LIVER FUNCTIONS - ( 2019 09:54 )  Alb: 2.1 g/dL / Pro: x     / ALK PHOS: x     / ALT: x     / AST: x     / GGT: x           RADIOLOGY & ADDITIONAL TESTS:      PHYSICAL EXAM:  GENERAL: NAD, well-groomed, well-developed  HEAD:  Atraumatic, Normocephalic  EYES: EOMI, PERRLA, conjunctiva and sclera clear  HEENT: Moist mucous membranes  NECK: Supple, No JVD  NERVOUS SYSTEM:  Alert & Oriented X3, Motor Strength 5/5 B/L upper and lower extremities; DTRs 2+ intact and symmetric  CHEST/LUNG: Clear to auscultation bilaterally; No rales, rhonchi, wheezing, or rubs  HEART: Regular rate and rhythm; No murmurs, rubs, or gallops  ABDOMEN: Soft, Nontender, Nondistended; Bowel sounds present  GENITOURINARY- Voiding, no palpable bladder  EXTREMITIES:  2+ Peripheral Pulses, No clubbing, cyanosis, or edema  MUSCULOSKELTAL- LT hip dressing dry  SKIN-no rash, no lesion  CNS- alert, oriented X3, non focal     Daily Weight in k (2019 11:42)    MEDICATIONS  (STANDING):  aspirin enteric coated 81 milliGRAM(s) Oral daily  atorvastatin 10 milliGRAM(s) Oral at bedtime  calcium carbonate 1250 mG  + Vitamin D (OsCal 500 + D) 1 Tablet(s) Oral daily  clopidogrel Tablet 75 milliGRAM(s) Oral daily  docusate sodium 100 milliGRAM(s) Oral three times a day  heparin  Injectable 5000 Unit(s) SubCutaneous every 12 hours  metoprolol succinate  milliGRAM(s) Oral daily  polyethylene glycol 3350 17 Gram(s) Oral daily  senna 2 Tablet(s) Oral at bedtime    MEDICATIONS  (PRN):  acetaminophen   Tablet .. 650 milliGRAM(s) Oral every 8 hours PRN Mild Pain (1 - 3)  acetaminophen   Tablet .. 650 milliGRAM(s) Oral every 6 hours PRN Temp greater or equal to 38.5C (101.3F), Moderate Pain (4 - 6)  HYDROmorphone  Injectable 0.5 milliGRAM(s) IV Push every 4 hours PRN Severe Pain (7 - 10)  magnesium hydroxide Suspension 30 milliLiter(s) Oral daily PRN Constipation  melatonin 3 milliGRAM(s) Oral at bedtime PRN Insomnia  ondansetron Injectable 4 milliGRAM(s) IV Push every 6 hours PRN Nausea and/or Vomiting  oxyCODONE    IR 2.5 milliGRAM(s) Oral every 4 hours PRN Moderate Pain (4 - 6)  oxyCODONE    IR 5 milliGRAM(s) Oral every 4 hours PRN Severe Pain (7 - 10)  traZODone 25 milliGRAM(s) Oral at bedtime PRN insomnia    Assessment/Plan  #S/p mechanical fall with LT hip fracture s/p IMN  #Anemia acute blood loss from fracture on chronic disease  Ortho f/u appreciated  PT as tolerated  S/p PRBC transfusion yesterday, HH stable today  Pain meds prn- will try to avoid narcotics  AC by SQ Heparin  Bowel regimen  Incentive spirometry    #CAD s/p PCI  Resume cardiac meds    #ESRD on HD TTS  Renal eval DR Alford appreciated  For HD tomorrow    #HTN/hyperlipidemia  Stable    #Dispo- cont PT, family wants her home with home PT, likely tomorrow  DNI/DNR
CC- s/p mechanical fall    HPI:  89 y/o female with HTN, CAD s/p PCI (br dr ceballos), CKD 5 on RRT (dr alford) and HL presents with L hip pain following mech fall last night.  No CP or SOB or palpitations.  On my exam, slightly hypertensive, NAD, awake and alert.  + L hip pain.  She gets HD T, Thurs and Sat.  Seen by ortho.  Dr ceballos service called--covered by dr lazo.  ECG--NSR L axis.  Admission CXR--no infiltrates/cardiomegaly   MOLST FORM COMPLETED--DNI/DNR (10 Feb 2019 11:11)    19- doing good today    Review of system- All 10 systems reviewed and is as per HPI otherwise negative.     T(C): 36.7 (19 @ 14:10), Max: 36.7 (02-10-19 @ 18:40)  HR: 65 (19 @ 14:10) (62 - 75)  BP: 102/50 (19 @ 14:10) (93/41 - 118/58)  RR: 18 (19 @ 14:10) (16 - 20)  SpO2: 98% (19 @ 14:10) (98% - 100%)  Wt(kg): --    LABS:                        8.4    11.66 )-----------( 191      ( 2019 05:54 )             27.0     141  |  105  |  35<H>  ----------------------------<  108<H>  4.9   |  27  |  4.22<H>    Ca    7.1<L>      2019 05:54  Phos  5.5         TPro  x   /  Alb  2.2<L>  /  TBili  x   /  DBili  x   /  AST  x   /  ALT  x   /  AlkPhos  x       PT/INR - ( 10 Feb 2019 08:34 )   PT: 10.6 sec;   INR: 0.96 ratio      PTT - ( 10 Feb 2019 08:34 )  PTT:19.6 sec    RADIOLOGY & ADDITIONAL TESTS:      PHYSICAL EXAM:  GENERAL: NAD, well-groomed, well-developed  HEAD:  Atraumatic, Normocephalic  EYES: EOMI, PERRLA, conjunctiva and sclera clear  HEENT: Moist mucous membranes  NECK: Supple, No JVD  NERVOUS SYSTEM:  Alert & Oriented X3, Motor Strength 5/5 B/L upper and lower extremities; DTRs 2+ intact and symmetric  CHEST/LUNG: Clear to auscultation bilaterally; No rales, rhonchi, wheezing, or rubs  HEART: Regular rate and rhythm; No murmurs, rubs, or gallops  ABDOMEN: Soft, Nontender, Nondistended; Bowel sounds present  GENITOURINARY- Voiding, no palpable bladder  EXTREMITIES:  2+ Peripheral Pulses, No clubbing, cyanosis, or edema  MUSCULOSKELTAL- LT hip dressing dry  SKIN-no rash, no lesion  CNS- alert, oriented X3, non focal     Daily Weight in k (2019 11:42)    MEDICATIONS  (STANDING):  aspirin enteric coated 81 milliGRAM(s) Oral daily  atorvastatin 10 milliGRAM(s) Oral at bedtime  calcium carbonate 1250 mG  + Vitamin D (OsCal 500 + D) 1 Tablet(s) Oral daily  clopidogrel Tablet 75 milliGRAM(s) Oral daily  docusate sodium 100 milliGRAM(s) Oral three times a day  heparin  Injectable 5000 Unit(s) SubCutaneous every 12 hours  metoprolol succinate  milliGRAM(s) Oral daily  polyethylene glycol 3350 17 Gram(s) Oral daily  senna 2 Tablet(s) Oral at bedtime    MEDICATIONS  (PRN):  acetaminophen   Tablet .. 650 milliGRAM(s) Oral every 8 hours PRN Mild Pain (1 - 3)  acetaminophen   Tablet .. 650 milliGRAM(s) Oral every 6 hours PRN Temp greater or equal to 38.5C (101.3F), Moderate Pain (4 - 6)  HYDROmorphone  Injectable 0.5 milliGRAM(s) IV Push every 4 hours PRN Severe Pain (7 - 10)  magnesium hydroxide Suspension 30 milliLiter(s) Oral daily PRN Constipation  melatonin 3 milliGRAM(s) Oral at bedtime PRN Insomnia  ondansetron Injectable 4 milliGRAM(s) IV Push every 6 hours PRN Nausea and/or Vomiting  oxyCODONE    IR 2.5 milliGRAM(s) Oral every 4 hours PRN Moderate Pain (4 - 6)  oxyCODONE    IR 5 milliGRAM(s) Oral every 4 hours PRN Severe Pain (7 - 10)  traZODone 25 milliGRAM(s) Oral at bedtime PRN insomnia    Assessment/Plan  #S/p mechanical fall with LT hip fracture s/p IMN  #Anemia acute blood loss from fracture on chronic disease  Ortho f/u appreciated  PT as tolerated  Monitor HH- if further drop may need PRBc transfusion with HD tomorrow  Pain meds prn- will try to avoid narcotics  AC by SQ Heparin  Bowel regimen  Incentive spirometry    #CAD s/p PCI  Resume cardiac meds    #ESRD on HD TTS  Renal eval DR Alford appreciated  For HD tomorrow    #HTN/hyperlipidemia  Stable    #Dispo- cont PT, likely [facility] on Wednesday  DNI/DNR
CC- s/p mechanical fall    HPI:  89 y/o female with HTN, CAD s/p PCI (br dr ceballos), CKD 5 on RRT (dr alford) and HL presents with L hip pain following mech fall last night.  No CP or SOB or palpitations.  On my exam, slightly hypertensive, NAD, awake and alert.  + L hip pain.  She gets HD T, Thurs and Sat.  Seen by ortho.  Dr ceballos service called--covered by dr lazo.  ECG--NSR L axis.  Admission CXR--no infiltrates/cardiomegaly   MOLST FORM COMPLETED--DNI/DNR (10 Feb 2019 11:11)    19- doing good today  19 OOB to chair, no acute complaints  19 no acute complaints    Review of system- All 10 systems reviewed and is as per HPI otherwise negative.     Vital Signs Last 24 Hrs  T(C): 36.3 (2019 11:01), Max: 36.9 (2019 05:10)  T(F): 97.3 (2019 11:01), Max: 98.4 (2019 05:10)  HR: 66 (2019 11:01) (66 - 80)  BP: 110/54 (2019 11:01) (93/48 - 115/56)  BP(mean): 67 (2019 11:01) (67 - 67)  RR: 16 (2019 11:01) (16 - 17)  SpO2: 98% (2019 11:01) (94% - 98%)    LABS:                        9.5    10.12 )-----------( 215      ( 2019 11:13 )             29.9     2019 11:13    141    |  105    |  27     ----------------------------<  113    3.6     |  28     |  3.49     Ca    7.2        2019 11:13  Phos  5.1       2019 09:54    TPro  x      /  Alb  2.1    /  TBili  x      /  DBili  x      /  AST  x      /  ALT  x      /  AlkPhos  x      2019 09:54    LIVER FUNCTIONS - ( 2019 09:54 )  Alb: 2.1 g/dL / Pro: x     / ALK PHOS: x     / ALT: x     / AST: x     / GGT: x           RADIOLOGY & ADDITIONAL TESTS:      PHYSICAL EXAM:  GENERAL: NAD, well-groomed, well-developed  HEAD:  Atraumatic, Normocephalic  EYES: EOMI, PERRLA, conjunctiva and sclera clear  HEENT: Moist mucous membranes  NECK: Supple, No JVD  NERVOUS SYSTEM:  Alert & Oriented X3, Motor Strength 5/5 B/L upper and lower extremities; DTRs 2+ intact and symmetric  CHEST/LUNG: Clear to auscultation bilaterally; No rales, rhonchi, wheezing, or rubs  HEART: Regular rate and rhythm; No murmurs, rubs, or gallops  ABDOMEN: Soft, Nontender, Nondistended; Bowel sounds present  GENITOURINARY- Voiding, no palpable bladder  EXTREMITIES:  2+ Peripheral Pulses, No clubbing, cyanosis, or edema  MUSCULOSKELTAL- LT hip dressing dry  SKIN-no rash, no lesion  CNS- alert, oriented X3, non focal     Daily Weight in k (2019 11:42)    MEDICATIONS  (STANDING):  aspirin enteric coated 81 milliGRAM(s) Oral daily  atorvastatin 10 milliGRAM(s) Oral at bedtime  calcium carbonate 1250 mG  + Vitamin D (OsCal 500 + D) 1 Tablet(s) Oral daily  clopidogrel Tablet 75 milliGRAM(s) Oral daily  docusate sodium 100 milliGRAM(s) Oral three times a day  heparin  Injectable 5000 Unit(s) SubCutaneous every 12 hours  metoprolol succinate  milliGRAM(s) Oral daily  polyethylene glycol 3350 17 Gram(s) Oral daily  senna 2 Tablet(s) Oral at bedtime    MEDICATIONS  (PRN):  acetaminophen   Tablet .. 650 milliGRAM(s) Oral every 8 hours PRN Mild Pain (1 - 3)  acetaminophen   Tablet .. 650 milliGRAM(s) Oral every 6 hours PRN Temp greater or equal to 38.5C (101.3F), Moderate Pain (4 - 6)  HYDROmorphone  Injectable 0.5 milliGRAM(s) IV Push every 4 hours PRN Severe Pain (7 - 10)  magnesium hydroxide Suspension 30 milliLiter(s) Oral daily PRN Constipation  melatonin 3 milliGRAM(s) Oral at bedtime PRN Insomnia  ondansetron Injectable 4 milliGRAM(s) IV Push every 6 hours PRN Nausea and/or Vomiting  oxyCODONE    IR 2.5 milliGRAM(s) Oral every 4 hours PRN Moderate Pain (4 - 6)  oxyCODONE    IR 5 milliGRAM(s) Oral every 4 hours PRN Severe Pain (7 - 10)  traZODone 25 milliGRAM(s) Oral at bedtime PRN insomnia    Assessment/Plan  #S/p mechanical fall with LT hip fracture s/p IMN  #Anemia acute blood loss from fracture on chronic disease  Ortho f/u appreciated  PT as tolerated  S/p PRBC transfusion, HH stable today  Pain meds prn- will try to avoid narcotics  AC by SQ Heparin  Bowel regimen  Incentive spirometry    #CAD s/p PCI  Resume cardiac meds    #ESRD on HD TTS  Renal eval DR Alford appreciated  Resume HD per schedule    #HTN/hyperlipidemia  Stable    #Dispo- [facility] today. DC time 40 mins. D/w pt  DNI/DNR
Cardiology Progress Note    HPI: 89 y/o female with HTN, CAD s/p PCI (br dr ceballos), 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  No CP or SOB or palpitations.  On my exam, slightly hypertensive, NAD, awake and alert.  + L hip pain.  She gets HD T, Thurs and Sat.  Seen by ortho.  Dr ceballos service called--covered by dr lazo.  ECG--NSR L axis.  Admission CXR--no infiltrates/cardiomegaly     2/12. No CP/SOB. Has not got out of bed yet.   Less pain today. Not on tele. No events last pm.     2/13. OOB yesterday. No new complaints. No CP/SOB.    PAST MEDICAL & SURGICAL HISTORY:  Stented coronary artery  HLD (hyperlipidemia)  HTN (hypertension)  CKD (chronic kidney disease)  CAD (coronary artery disease)  Stented coronary artery: unable to recall date  H/O abdominal surgery: for a colon cyst in the 1940&#x27;s  H/O oophorectomy    Allergies  sulfa drugs (Unknown)    SOCIAL HISTORY: Denies tobacco, etoh abuse or illicit drug use    MEDICATIONS  (STANDING):  aspirin enteric coated 81 milliGRAM(s) Oral daily  atorvastatin 10 milliGRAM(s) Oral at bedtime  calcium carbonate 1250 mG  + Vitamin D (OsCal 500 + D) 1 Tablet(s) Oral daily  clopidogrel Tablet 75 milliGRAM(s) Oral daily  docusate sodium 100 milliGRAM(s) Oral three times a day  heparin  Injectable 5000 Unit(s) SubCutaneous every 12 hours  metoprolol succinate  milliGRAM(s) Oral daily  polyethylene glycol 3350 17 Gram(s) Oral daily  senna 2 Tablet(s) Oral at bedtime    MEDICATIONS  (PRN):  acetaminophen   Tablet .. 650 milliGRAM(s) Oral every 8 hours PRN Mild Pain (1 - 3)  acetaminophen   Tablet .. 650 milliGRAM(s) Oral every 6 hours PRN Temp greater or equal to 38.5C (101.3F), Moderate Pain (4 - 6)  bisacodyl Suppository 10 milliGRAM(s) Rectal daily PRN If no bowel movement  HYDROmorphone  Injectable 0.5 milliGRAM(s) IV Push every 4 hours PRN Severe Pain (7 - 10)  magnesium hydroxide Suspension 30 milliLiter(s) Oral daily PRN Constipation  melatonin 3 milliGRAM(s) Oral at bedtime PRN Insomnia  ondansetron Injectable 4 milliGRAM(s) IV Push every 6 hours PRN Nausea and/or Vomiting  oxyCODONE    IR 2.5 milliGRAM(s) Oral every 4 hours PRN Moderate Pain (4 - 6)  oxyCODONE    IR 5 milliGRAM(s) Oral every 4 hours PRN Severe Pain (7 - 10)  traZODone 25 milliGRAM(s) Oral at bedtime PRN insomnia      Vital Signs Last 24 Hrs  T(C): 36.8 (12 Feb 2019 06:20), Max: 36.8 (12 Feb 2019 06:20)  T(F): 98.2 (12 Feb 2019 06:20), Max: 98.2 (12 Feb 2019 06:20)  HR: 72 (12 Feb 2019 06:20) (60 - 73)  BP: 120/53 (12 Feb 2019 06:20) (93/41 - 139/62)  BP(mean): --  RR: 16 (12 Feb 2019 06:20) (16 - 18)  SpO2: 97% (12 Feb 2019 06:20) (93% - 98%)    REVIEW OF SYSTEMS:    CONSTITUTIONAL:  As per HPI.  HEENT:  Eyes:  No diplopia or blurred vision. ENT:  No earache, sore throat or runny nose.  CARDIOVASCULAR:  No pressure, squeezing, strangling, tightness, heaviness or aching about the chest, neck, axilla or epigastrium.  RESPIRATORY:  No cough, shortness of breath, PND or orthopnea.  GASTROINTESTINAL:  No nausea, vomiting or diarrhea.  GENITOURINARY:  No dysuria, frequency or urgency.  MUSCULOSKELETAL:  As per HPI.  SKIN:  No change in skin, hair or nails.  NEUROLOGIC:  No paresthesias, fasciculations, seizures or weakness.  PSYCHIATRIC:  No disorder of thought or mood.  ENDOCRINE:  No heat or cold intolerance, polyuria or polydipsia.  HEMATOLOGICAL:  No easy bruising or bleedings.     PHYSICAL EXAMINATION:    GENERAL APPEARANCE:  Pt. is not currently dyspneic, in no distress. Pt. is alert, oriented, and pleasant.  HEENT:  Pupils are normal and react normally. No icterus. Mucous membranes well colored.  NECK:  Supple. No lymphadenopathy. Jugular venous pressure not elevated. Carotids equal.   HEART:   The cardiac impulse has a normal quality. There are no murmurs, rubs or gallops noted  CHEST:  Chest is clear to auscultation. Normal respiratory effort.  ABDOMEN:  Soft and nontender.   EXTREMITIES:  There is no edema.   SKIN:  No rash or significant lesions are noted.    I&O's Summary    11 Feb 2019 07:01  -  12 Feb 2019 07:00  --------------------------------------------------------  IN: 544 mL / OUT: 1 mL / NET: 543 mL        LABS:                        8.4    11.66 )-----------( 191      ( 11 Feb 2019 05:54 )             27.0     02-11    141  |  105  |  35<H>  ----------------------------<  108<H>  4.9   |  27  |  4.22<H>    Ca    7.1<L>      11 Feb 2019 05:54  Phos  5.5     02-11    TPro  x   /  Alb  2.2<L>  /  TBili  x   /  DBili  x   /  AST  x   /  ALT  x   /  AlkPhos  x   02-11    LIVER FUNCTIONS - ( 11 Feb 2019 05:54 )  Alb: 2.2 g/dL / Pro: x     / ALK PHOS: x     / ALT: x     / AST: x     / GGT: x           EKG: < from: 12 Lead ECG (02.10.19 @ 09:19) >  Sinus bradycardia  Left anterior fascicular block  Cannot rule out Inferior infarct (masked by fascicular block?) , age undetermined  Anterior infarct , age undetermined  Abnormal ECG    TELEMETRY: Not on tele.     CARDIAC TESTS: None    RADIOLOGY & ADDITIONAL STUDIES:  CXR- NAPD    ASSESSMENT & PLAN:
Cardiology Progress Note    HPI: 89 y/o female with HTN, CAD s/p PCI (br dr ceballos), 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  No CP or SOB or palpitations.  On my exam, slightly hypertensive, NAD, awake and alert.  + L hip pain.  She gets HD T, Thurs and Sat.  Seen by ortho.  Dr ceballos service called--covered by dr lazo.  ECG--NSR L axis.  Admission CXR--no infiltrates/cardiomegaly     2/12. No CP/SOB. Has not got out of bed yet.   Less pain today. Not on tele. No events last pm.     PAST MEDICAL & SURGICAL HISTORY:  Stented coronary artery  HLD (hyperlipidemia)  HTN (hypertension)  CKD (chronic kidney disease)  CAD (coronary artery disease)  Stented coronary artery: unable to recall date  H/O abdominal surgery: for a colon cyst in the 1940&#x27;s  H/O oophorectomy    Allergies  sulfa drugs (Unknown)    SOCIAL HISTORY: Denies tobacco, etoh abuse or illicit drug use    MEDICATIONS  (STANDING):  aspirin enteric coated 81 milliGRAM(s) Oral daily  atorvastatin 10 milliGRAM(s) Oral at bedtime  calcium carbonate 1250 mG  + Vitamin D (OsCal 500 + D) 1 Tablet(s) Oral daily  clopidogrel Tablet 75 milliGRAM(s) Oral daily  docusate sodium 100 milliGRAM(s) Oral three times a day  heparin  Injectable 5000 Unit(s) SubCutaneous every 12 hours  metoprolol succinate  milliGRAM(s) Oral daily  polyethylene glycol 3350 17 Gram(s) Oral daily  senna 2 Tablet(s) Oral at bedtime    MEDICATIONS  (PRN):  acetaminophen   Tablet .. 650 milliGRAM(s) Oral every 8 hours PRN Mild Pain (1 - 3)  acetaminophen   Tablet .. 650 milliGRAM(s) Oral every 6 hours PRN Temp greater or equal to 38.5C (101.3F), Moderate Pain (4 - 6)  bisacodyl Suppository 10 milliGRAM(s) Rectal daily PRN If no bowel movement  HYDROmorphone  Injectable 0.5 milliGRAM(s) IV Push every 4 hours PRN Severe Pain (7 - 10)  magnesium hydroxide Suspension 30 milliLiter(s) Oral daily PRN Constipation  melatonin 3 milliGRAM(s) Oral at bedtime PRN Insomnia  ondansetron Injectable 4 milliGRAM(s) IV Push every 6 hours PRN Nausea and/or Vomiting  oxyCODONE    IR 2.5 milliGRAM(s) Oral every 4 hours PRN Moderate Pain (4 - 6)  oxyCODONE    IR 5 milliGRAM(s) Oral every 4 hours PRN Severe Pain (7 - 10)  traZODone 25 milliGRAM(s) Oral at bedtime PRN insomnia      Vital Signs Last 24 Hrs  T(C): 36.8 (12 Feb 2019 06:20), Max: 36.8 (12 Feb 2019 06:20)  T(F): 98.2 (12 Feb 2019 06:20), Max: 98.2 (12 Feb 2019 06:20)  HR: 72 (12 Feb 2019 06:20) (60 - 73)  BP: 120/53 (12 Feb 2019 06:20) (93/41 - 139/62)  BP(mean): --  RR: 16 (12 Feb 2019 06:20) (16 - 18)  SpO2: 97% (12 Feb 2019 06:20) (93% - 98%)    REVIEW OF SYSTEMS:    CONSTITUTIONAL:  As per HPI.  HEENT:  Eyes:  No diplopia or blurred vision. ENT:  No earache, sore throat or runny nose.  CARDIOVASCULAR:  No pressure, squeezing, strangling, tightness, heaviness or aching about the chest, neck, axilla or epigastrium.  RESPIRATORY:  No cough, shortness of breath, PND or orthopnea.  GASTROINTESTINAL:  No nausea, vomiting or diarrhea.  GENITOURINARY:  No dysuria, frequency or urgency.  MUSCULOSKELETAL:  As per HPI.  SKIN:  No change in skin, hair or nails.  NEUROLOGIC:  No paresthesias, fasciculations, seizures or weakness.  PSYCHIATRIC:  No disorder of thought or mood.  ENDOCRINE:  No heat or cold intolerance, polyuria or polydipsia.  HEMATOLOGICAL:  No easy bruising or bleedings.     PHYSICAL EXAMINATION:    GENERAL APPEARANCE:  Pt. is not currently dyspneic, in no distress. Pt. is alert, oriented, and pleasant.  HEENT:  Pupils are normal and react normally. No icterus. Mucous membranes well colored.  NECK:  Supple. No lymphadenopathy. Jugular venous pressure not elevated. Carotids equal.   HEART:   The cardiac impulse has a normal quality. There are no murmurs, rubs or gallops noted  CHEST:  Chest is clear to auscultation. Normal respiratory effort.  ABDOMEN:  Soft and nontender.   EXTREMITIES:  There is no edema.   SKIN:  No rash or significant lesions are noted.    I&O's Summary    11 Feb 2019 07:01  -  12 Feb 2019 07:00  --------------------------------------------------------  IN: 544 mL / OUT: 1 mL / NET: 543 mL        LABS:                        8.4    11.66 )-----------( 191      ( 11 Feb 2019 05:54 )             27.0     02-11    141  |  105  |  35<H>  ----------------------------<  108<H>  4.9   |  27  |  4.22<H>    Ca    7.1<L>      11 Feb 2019 05:54  Phos  5.5     02-11    TPro  x   /  Alb  2.2<L>  /  TBili  x   /  DBili  x   /  AST  x   /  ALT  x   /  AlkPhos  x   02-11    LIVER FUNCTIONS - ( 11 Feb 2019 05:54 )  Alb: 2.2 g/dL / Pro: x     / ALK PHOS: x     / ALT: x     / AST: x     / GGT: x           EKG: < from: 12 Lead ECG (02.10.19 @ 09:19) >  Sinus bradycardia  Left anterior fascicular block  Cannot rule out Inferior infarct (masked by fascicular block?) , age undetermined  Anterior infarct , age undetermined  Abnormal ECG    TELEMETRY: Not on tele.     CARDIAC TESTS: None    RADIOLOGY & ADDITIONAL STUDIES:  CXR- [facility]    ASSESSMENT & PLAN:
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  Now on 2 north S/P Left Hip IMN on 2/10/19.    : seen at bedside with sons present.  Discussed need for anticoagulation and they are in agreement to use heparin subcut  19: Pt seen in , awaiting transportation to dialysis this morning. No complaints, no issues with heparin SQ for VTE ppx.   : Patient seen at bedside[facility]     Patient is a 89y old  Female who presents with a chief complaint of L hip pain (2019 07:26)    Consulted by Dr. Mcclure  for VTE prophylaxis, risk stratification, and anticoagulation management.    PAST MEDICAL & SURGICAL HISTORY:  Stented coronary artery  HLD (hyperlipidemia)  HTN (hypertension)  CKD (chronic kidney disease)  CAD (coronary artery disease)  Stented coronary artery: unable to recall date  H/O abdominal surgery: for a colon cyst in the 1940&#x27;s  H/O oophorectomy      CrCl: 7.9    Caprini VTE Risk Score: CAPRINI SCORE [CLOT]    AGE RELATED RISK FACTORS                                                       MOBILITY RELATED FACTORS  [ ] Age 41-60 years                                            (1 Point)                  [ ] Bed rest                                                        (1 Point)  [ ] Age: 61-74 years                                           (2 Points)                 [ ] Plaster cast                                                   (2 Points)  [ x] Age= 75 years                                              (3 Points)                 [ ] Bed bound for more than 72 hours                 (2 Points)    DISEASE RELATED RISK FACTORS                                               GENDER SPECIFIC FACTORS  [ ] Edema in the lower extremities                       (1 Point)                  [ ] Pregnancy                                                     (1 Point)  [ ] Varicose veins                                               (1 Point)                  [ ] Post-partum < 6 weeks                                   (1 Point)             [ ] BMI > 25 Kg/m2                                            (1 Point)                  [ ] Hormonal therapy  or oral contraception          (1 Point)                 [ ] Sepsis (in the previous month)                        (1 Point)                  [ ] History of pregnancy complications                 (1 point)  [ ] Pneumonia or serious lung disease                                               [ ] Unexplained or recurrent                     (1 Point)           (in the previous month)                               (1 Point)  [ ] Abnormal pulmonary function test                     (1 Point)                 SURGERY RELATED RISK FACTORS  [ ] Acute myocardial infarction                              (1 Point)                 [ ]  Section                                             (1 Point)  [ ] Congestive heart failure (in the previous month)  (1 Point)               [ ] Minor surgery                                                  (1 Point)   [ ] Inflammatory bowel disease                             (1 Point)                 [ ] Arthroscopic surgery                                        (2 Points)  [ ] Central venous access                                      (2 Points)                [ ] General surgery lasting more than 45 minutes   (2 Points)       [ ] Stroke (in the previous month)                          (5 Points)               [ ] Elective arthroplasty                                         (5 Points)            [ ] H/O malignancy ( present or previous)            ( 2 points)                                                                                                                                   HEMATOLOGY RELATED FACTORS                                                 TRAUMA RELATED RISK FACTORS  [ ] Prior episodes of VTE                                     (3 Points)                 [ x] Fracture of the hip, pelvis, or leg                       (5 Points)  [ ] Positive family history for VTE                         (3 Points)                 [ ] Acute spinal cord injury (in the previous month)  (5 Points)  [ ] Prothrombin 32342 A                                     (3 Points)                 [ ] Paralysis  (less than 1 month)                             (5 Points)  [ ] Factor V Leiden                                             (3 Points)                  [ ] Multiple Trauma within 1 month                        (5 Points)  [ ] Lupus anticoagulants                                     (3 Points)                                                           [ ] Anticardiolipin antibodies                               (3 Points)                                                       [ ] High homocysteine in the blood                      (3 Points)                                             [ ] Other congenital or acquired thrombophilia      (3 Points)                                                [ ] Heparin induced thrombocytopenia                  (3 Points)                                          Total Score [  8  ]    IMPROVE Bleeding Risk Score: #6    Falls Risk:   High (x  )  Mod (  )  Low (  )    FAMILY HISTORY:  Denies any personal or familial history of clotting or bleeding disorders.    Allergies  sulfa drugs (Unknown)  Intolerances    REVIEW OF SYSTEMS    (  )Fever	     (  )Constipation	(  )SOB				(  )Headache	(  )Dysuria  (  )Chills	     (  )Melena	(  )Dyspnea present on exertion	                    (  )Dizziness                    (  )Polyuria  (  )Nausea	     (  )Hematochezia	(  )Cough			                    (  )Syncope   	(  )Hematuria  (  )Vomiting    (  )Chest Pain	(  )Wheezing			( X )Weakness  (  )Diarrhea     (  )Palpitations	(  )Anorexia			( x )Myalgia    All  other review of systems negative: Yes    Vital Signs Last 24 Hrs  T(C): 36.3 (19 @ 11:01), Max: 36.9 (19 @ 05:10)  T(F): 97.3 (19 @ 11:01), Max: 98.4 (19 @ 05:10)  HR: 66 (19 @ 11:01) (66 - 80)  BP: 110/54 (19 @ 11:01) (93/48 - 115/56)  BP(mean): 67 (19 @ 11:01) (67 - 67)  RR: 16 (19 @ 11:01) (16 - 17)  SpO2: 98% (19 @ 11:01) (94% - 98%)    Physical Exam:    Constitutional: Appears Well    Neurological: A& O x  3    Skin: Warm     Respiratory and Thorax: normal effort; Breath sounds: normal; No rales/wheezing/rhonchi  	  Cardiovascular: S1, S2, regular, NMBR	    Gastrointestinal: BS + x 4Q, nontender	    Genitourinary:  Bladder nondistended, nontender    Musculoskeletal:   General Right:   no muscle/joint tenderness,   normal tone, no joint swelling,   ROM: full	    General Left:   + muscle/joint tenderness,   normal tone, no joint swelling,   ROM: limited    RUE: AV fistula, + bruit/thrill    Hip: Left: Aquacel dressing CDI    Lower extrems:   Right: no calf tenderness              negative valeriano's sign               + pedal pulses    Left:   no calf tenderness              negative valeriano's sign               + pedal pulses                          9.5    10.12 )-----------( 215      ( 2019 11:13 )             29.9           136  |  102  |  57<H>  ----------------------------<  114<H>  4.3   |  24  |  5.37<H>    Ca    7.0<L>      2019 09:54  Phos  5.1         TPro  x   /  Alb  2.1<L>  /  TBili  x   /  DBili  x   /  AST  x   /  ALT  x   /  AlkPhos  x                                   9.8    10.62 )-----------( 180      ( 2019 09:54 )             30.1           136  |  102  |  57<H>  ----------------------------<  114<H>  4.3   |  24  |  5.37<H>    Ca    7.0<L>      2019 09:54  Phos  5.1     02-12    TPro  x   /  Alb  2.1<L>  /  TBili  x   /  DBili  x   /  AST  x   /  ALT  x   /  AlkPhos  x                           8.4    11.66 )-----------( 191      ( 2019 05:54 )             27.0           141  |  105  |  35<H>  ----------------------------<  108<H>  4.9   |  27  |  4.22<H>    Ca    7.1<L>      2019 05:54  Phos  5.5         TPro  x   /  Alb  2.2<L>  /  TBili  x   /  DBili  x   /  AST  x   /  ALT  x   /  AlkPhos  x         PT/INR - ( 10 Feb 2019 08:34 )   PT: 10.6 sec;   INR: 0.96 ratio         PTT - ( 10 Feb 2019 08:34 )  PTT:19.6 sec		    CT head  FINDINGS:      HEMISPHERES:There is generalized volume loss with mild to moderate   atrophy. Chronic ischemic changes are scattered but with no acute   abnormality. The vessels are extensively calcified, inclusive of the   anterior and posterior circulation.  VENTRICLES:  Midline and normal in size.  POSTERIOR FOSSA:  Ischemic changes in the brainstem noted of   indeterminate age.  EXTRACEREBRAL SPACES:  No subdural or epidural collections are noted.  SKULL BASE AND CALVARIUM:  Appears intact.  No fracture or destructive   lesion is identified.  SINUSES AND MASTOIDS:  Clear.  MISCELLANEOUS:  No orbital or suprasellar abnormality noted.      IMPRESSION:    1)  chronic ischemic changes with mild to moderate atrophy. Extensive atherosclerotic calcification of the intervertebral vasculature..  2)  no intracerebral hemorrhage or contusion is identified.      CT Spine    IMPRESSION: Cervical CT negative for acute fracture with multilevel degenerative changes.    MEDICATIONS  (STANDING):  aspirin enteric coated 81 milliGRAM(s) Oral daily  atorvastatin 10 milliGRAM(s) Oral at bedtime  calcium carbonate 1250 mG  + Vitamin D (OsCal 500 + D) 1 Tablet(s) Oral daily  clopidogrel Tablet 75 milliGRAM(s) Oral daily  docusate sodium 100 milliGRAM(s) Oral three times a day  heparin  Injectable 5000 Unit(s) SubCutaneous every 12 hours  metoprolol succinate  milliGRAM(s) Oral daily  polyethylene glycol 3350 17 Gram(s) Oral daily  senna 2 Tablet(s) Oral at bedtime    DVT Prophylaxis:  LMWH                   (  )  Heparin SQ           ( x )  Coumadin             (  )  Xarelto                  (  )  Eliquis                   (  )  Venodynes           ( x )  Ambulation          ( x )  UFH                       (  )  Contraindicated  (  )
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  Now on 2 north S/P Left Hip IMN on 2/10/19.    : seen at bedside with sons present.  Discussed need for anticoagulation and they are in agreement to use heparin subcut  19: Pt seen in 2N, awaiting transportation to dialysis this morning. No complaints, no issues with heparin SQ for VTE ppx.     Patient is a 89y old  Female who presents with a chief complaint of L hip pain (2019 07:26)    Consulted by Dr. Mcclure  for VTE prophylaxis, risk stratification, and anticoagulation management.    PAST MEDICAL & SURGICAL HISTORY:  Stented coronary artery  HLD (hyperlipidemia)  HTN (hypertension)  CKD (chronic kidney disease)  CAD (coronary artery disease)  Stented coronary artery: unable to recall date  H/O abdominal surgery: for a colon cyst in the 1940&#x27;s  H/O oophorectomy      CrCl: 7.9    Caprini VTE Risk Score: CAPRINI SCORE [CLOT]    AGE RELATED RISK FACTORS                                                       MOBILITY RELATED FACTORS  [ ] Age 41-60 years                                            (1 Point)                  [ ] Bed rest                                                        (1 Point)  [ ] Age: 61-74 years                                           (2 Points)                 [ ] Plaster cast                                                   (2 Points)  [ x] Age= 75 years                                              (3 Points)                 [ ] Bed bound for more than 72 hours                 (2 Points)    DISEASE RELATED RISK FACTORS                                               GENDER SPECIFIC FACTORS  [ ] Edema in the lower extremities                       (1 Point)                  [ ] Pregnancy                                                     (1 Point)  [ ] Varicose veins                                               (1 Point)                  [ ] Post-partum < 6 weeks                                   (1 Point)             [ ] BMI > 25 Kg/m2                                            (1 Point)                  [ ] Hormonal therapy  or oral contraception          (1 Point)                 [ ] Sepsis (in the previous month)                        (1 Point)                  [ ] History of pregnancy complications                 (1 point)  [ ] Pneumonia or serious lung disease                                               [ ] Unexplained or recurrent                     (1 Point)           (in the previous month)                               (1 Point)  [ ] Abnormal pulmonary function test                     (1 Point)                 SURGERY RELATED RISK FACTORS  [ ] Acute myocardial infarction                              (1 Point)                 [ ]  Section                                             (1 Point)  [ ] Congestive heart failure (in the previous month)  (1 Point)               [ ] Minor surgery                                                  (1 Point)   [ ] Inflammatory bowel disease                             (1 Point)                 [ ] Arthroscopic surgery                                        (2 Points)  [ ] Central venous access                                      (2 Points)                [ ] General surgery lasting more than 45 minutes   (2 Points)       [ ] Stroke (in the previous month)                          (5 Points)               [ ] Elective arthroplasty                                         (5 Points)            [ ] H/O malignancy ( present or previous)            ( 2 points)                                                                                                                                   HEMATOLOGY RELATED FACTORS                                                 TRAUMA RELATED RISK FACTORS  [ ] Prior episodes of VTE                                     (3 Points)                 [ x] Fracture of the hip, pelvis, or leg                       (5 Points)  [ ] Positive family history for VTE                         (3 Points)                 [ ] Acute spinal cord injury (in the previous month)  (5 Points)  [ ] Prothrombin 66914 A                                     (3 Points)                 [ ] Paralysis  (less than 1 month)                             (5 Points)  [ ] Factor V Leiden                                             (3 Points)                  [ ] Multiple Trauma within 1 month                        (5 Points)  [ ] Lupus anticoagulants                                     (3 Points)                                                           [ ] Anticardiolipin antibodies                               (3 Points)                                                       [ ] High homocysteine in the blood                      (3 Points)                                             [ ] Other congenital or acquired thrombophilia      (3 Points)                                                [ ] Heparin induced thrombocytopenia                  (3 Points)                                          Total Score [  8  ]    IMPROVE Bleeding Risk Score: #6    Falls Risk:   High (x  )  Mod (  )  Low (  )    FAMILY HISTORY:  Denies any personal or familial history of clotting or bleeding disorders.    Allergies  sulfa drugs (Unknown)  Intolerances    REVIEW OF SYSTEMS    (  )Fever	     (  )Constipation	(  )SOB				(  )Headache	(  )Dysuria  (  )Chills	     (  )Melena	(  )Dyspnea present on exertion	                    (  )Dizziness                    (  )Polyuria  (  )Nausea	     (  )Hematochezia	(  )Cough			                    (  )Syncope   	(  )Hematuria  (  )Vomiting    (  )Chest Pain	(  )Wheezing			( X )Weakness  (  )Diarrhea     (  )Palpitations	(  )Anorexia			( x )Myalgia    All  other review of systems negative: Yes    PHYSICAL EXAM:    Vital Signs Last 24 Hrs  T(C): 36.3 (19 @ 10:49), Max: 36.8 (19 @ 06:20)  T(F): 97.4 (19 @ 10:49), Max: 98.2 (19 @ 06:20)  HR: 64 (19 @ 10:49) (60 - 73)  BP: 109/55 (19 @ 10:49) (102/50 - 139/62)  BP(mean): --  RR: 16 (19 @ 10:49) (16 - 18)  SpO2: 98% (19 @ 10:49) (93% - 98%)    Constitutional: Appears Well    Neurological: A& O x  3    Skin: Warm     Respiratory and Thorax: normal effort; Breath sounds: normal; No rales/wheezing/rhonchi  	  Cardiovascular: S1, S2, regular, NMBR	    Gastrointestinal: BS + x 4Q, nontender	    Genitourinary:  Bladder nondistended, nontender    Musculoskeletal:   General Right:   no muscle/joint tenderness,   normal tone, no joint swelling,   ROM: full	    General Left:   + muscle/joint tenderness,   normal tone, no joint swelling,   ROM: limited    RUE: AV fistula, + bruit/thrill    Hip: Left: Aquacel dressing CDI    Lower extrems:   Right: no calf tenderness              negative valeriano's sign               + pedal pulses    Left:   no calf tenderness              negative valeriano's sign               + pedal pulses                          9.8    10.62 )-----------( 180      ( 2019 09:54 )             30.1           136  |  102  |  57<H>  ----------------------------<  114<H>  4.3   |  24  |  5.37<H>    Ca    7.0<L>      2019 09:54  Phos  5.1         TPro  x   /  Alb  2.1<L>  /  TBili  x   /  DBili  x   /  AST  x   /  ALT  x   /  AlkPhos  x                           8.4    11.66 )-----------( 191      ( 2019 05:54 )             27.0           141  |  105  |  35<H>  ----------------------------<  108<H>  4.9   |  27  |  4.22<H>    Ca    7.1<L>      2019 05:54  Phos  5.5         TPro  x   /  Alb  2.2<L>  /  TBili  x   /  DBili  x   /  AST  x   /  ALT  x   /  AlkPhos  x   02-11      PT/INR - ( 10 Feb 2019 08:34 )   PT: 10.6 sec;   INR: 0.96 ratio         PTT - ( 10 Feb 2019 08:34 )  PTT:19.6 sec		    CT head  FINDINGS:      HEMISPHERES:There is generalized volume loss with mild to moderate   atrophy. Chronic ischemic changes are scattered but with no acute   abnormality. The vessels are extensively calcified, inclusive of the   anterior and posterior circulation.  VENTRICLES:  Midline and normal in size.  POSTERIOR FOSSA:  Ischemic changes in the brainstem noted of   indeterminate age.  EXTRACEREBRAL SPACES:  No subdural or epidural collections are noted.  SKULL BASE AND CALVARIUM:  Appears intact.  No fracture or destructive   lesion is identified.  SINUSES AND MASTOIDS:  Clear.  MISCELLANEOUS:  No orbital or suprasellar abnormality noted.      IMPRESSION:    1)  chronic ischemic changes with mild to moderate atrophy. Extensive atherosclerotic calcification of the intervertebral vasculature..  2)  no intracerebral hemorrhage or contusion is identified.      CT Spine    IMPRESSION: Cervical CT negative for acute fracture with multilevel degenerative changes.    MEDICATIONS  (STANDING):  aspirin enteric coated 81 milliGRAM(s) Oral daily  atorvastatin 10 milliGRAM(s) Oral at bedtime  calcium carbonate 1250 mG  + Vitamin D (OsCal 500 + D) 1 Tablet(s) Oral daily  clopidogrel Tablet 75 milliGRAM(s) Oral daily  docusate sodium 100 milliGRAM(s) Oral three times a day  heparin  Injectable 5000 Unit(s) SubCutaneous every 12 hours  metoprolol succinate  milliGRAM(s) Oral daily  polyethylene glycol 3350 17 Gram(s) Oral daily  senna 2 Tablet(s) Oral at bedtime    DVT Prophylaxis:  LMWH                   (  )  Heparin SQ           ( x )  Coumadin             (  )  Xarelto                  (  )  Eliquis                   (  )  Venodynes           ( x )  Ambulation          ( x )  UFH                       (  )  Contraindicated  (  )
NEPHROLOGY INTERVAL HPI/OVERNIGHT EVENTS:    Date of Service: 02-12-19 @ 13:24  2/12 SY  Feeling weak and tired.  Reports decreased po intake.  No acute events.    2/11 SY  Feeling well. No new complaints.  Positive pain at surgical site.    HPI:  89 yo woman with ESRD on maintenance HD TIW since 5/2018.   Pt has been tolerating tx well with stable clinical status and with no acute issues.  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  Change her 's diaper a 2:00 AM and fell while walking back to bed.  Cant explain what happened.  Does not recall whether she felt lightheaded or dizzy.  In ED, found to have Left Intertrochanteric fracture.  Post Hip repair earlier today.  Feels tired.  Denies SOB.    PMHX and PSHX.  1.HTN  2.CAD (stent x1 in 2007)  3.Previous hx of syncope    MEDICATIONS  (STANDING):  aspirin enteric coated 81 milliGRAM(s) Oral daily  atorvastatin 10 milliGRAM(s) Oral at bedtime  calcium carbonate 1250 mG  + Vitamin D (OsCal 500 + D) 1 Tablet(s) Oral daily  clopidogrel Tablet 75 milliGRAM(s) Oral daily  docusate sodium 100 milliGRAM(s) Oral three times a day  epoetin [place] Injectable 2000 Unit(s) IV Push <User Schedule>  epoetin [place] Injectable 3000 Unit(s) IV Push <User Schedule>  heparin  Injectable 5000 Unit(s) SubCutaneous every 12 hours  metoprolol succinate  milliGRAM(s) Oral daily  polyethylene glycol 3350 17 Gram(s) Oral daily  senna 2 Tablet(s) Oral at bedtime    MEDICATIONS  (PRN):  acetaminophen   Tablet .. 650 milliGRAM(s) Oral every 8 hours PRN Mild Pain (1 - 3)  acetaminophen   Tablet .. 650 milliGRAM(s) Oral every 6 hours PRN Temp greater or equal to 38.5C (101.3F), Moderate Pain (4 - 6)  bisacodyl Suppository 10 milliGRAM(s) Rectal daily PRN If no bowel movement  HYDROmorphone  Injectable 0.5 milliGRAM(s) IV Push every 4 hours PRN Severe Pain (7 - 10)  magnesium hydroxide Suspension 30 milliLiter(s) Oral daily PRN Constipation  melatonin 3 milliGRAM(s) Oral at bedtime PRN Insomnia  ondansetron Injectable 4 milliGRAM(s) IV Push every 6 hours PRN Nausea and/or Vomiting  oxyCODONE    IR 2.5 milliGRAM(s) Oral every 4 hours PRN Moderate Pain (4 - 6)  oxyCODONE    IR 5 milliGRAM(s) Oral every 4 hours PRN Severe Pain (7 - 10)  traZODone 25 milliGRAM(s) Oral at bedtime PRN insomnia      Vital Signs Last 24 Hrs  T(C): 36.1 (12 Feb 2019 11:21), Max: 36.8 (12 Feb 2019 06:20)  T(F): 97 (12 Feb 2019 11:21), Max: 98.2 (12 Feb 2019 06:20)  HR: 73 (12 Feb 2019 13:13) (60 - 74)  BP: 102/51 (12 Feb 2019 13:13) (93/54 - 139/62)  BP(mean): --  RR: 17 (12 Feb 2019 13:13) (16 - 18)  SpO2: 98% (12 Feb 2019 10:49) (93% - 98%)  Daily     Daily     02-11 @ 07:01  -  02-12 @ 07:00  --------------------------------------------------------  IN: 544 mL / OUT: 1 mL / NET: 543 mL      PHYSICAL EXAM:  Alert and appropriate  GENERAL: No distress  CHEST/LUNG: Clear to aus  HEART: S1S2 RRR  ABDOMEN: soft  EXTREMITIES: no edema  SKIN:     LABS:                        9.8    10.62 )-----------( 180      ( 12 Feb 2019 09:54 )             30.1     02-12    136  |  102  |  57<H>  ----------------------------<  114<H>  4.3   |  24  |  5.37<H>    Ca    7.0<L>      12 Feb 2019 09:54  Phos  5.1     02-12    TPro  x   /  Alb  2.1<L>  /  TBili  x   /  DBili  x   /  AST  x   /  ALT  x   /  AlkPhos  x   02-12        Phosphorus Level, Serum: 5.1 mg/dL (02-12 @ 09:54)          RADIOLOGY & ADDITIONAL TESTS:
NEPHROLOGY INTERVAL HPI/OVERNIGHT EVENTS:    Date of Service: 02-13-19 @ 09:53  2/13 SY  Feeling better and able to eat better this am.  Now understands she needs to go to rehab.    2/12 SY  Feeling weak and tired.  Reports decreased po intake.  No acute events.    2/11 SY  Feeling well. No new complaints.  Positive pain at surgical site.    HPI:  89 yo woman with ESRD on maintenance HD TIW since 5/2018.   Pt has been tolerating tx well with stable clinical status and with no acute issues.  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  Change her 's diaper a 2:00 AM and fell while walking back to bed.  Cant explain what happened.  Does not recall whether she felt lightheaded or dizzy.  In ED, found to have Left Intertrochanteric fracture.  Post Hip repair earlier today.  Feels tired.  Denies SOB.    PMHX and PSHX.  1.HTN  2.CAD (stent x1 in 2007)  3.Previous hx of syncope    MEDICATIONS  (STANDING):  aspirin enteric coated 81 milliGRAM(s) Oral daily  atorvastatin 10 milliGRAM(s) Oral at bedtime  calcium carbonate 1250 mG  + Vitamin D (OsCal 500 + D) 1 Tablet(s) Oral daily  clopidogrel Tablet 75 milliGRAM(s) Oral daily  docusate sodium 100 milliGRAM(s) Oral three times a day  epoetin [place] Injectable 2000 Unit(s) IV Push <User Schedule>  epoetin [place] Injectable 3000 Unit(s) IV Push <User Schedule>  heparin  Injectable 5000 Unit(s) SubCutaneous every 12 hours  metoprolol succinate  milliGRAM(s) Oral daily  polyethylene glycol 3350 17 Gram(s) Oral daily  senna 2 Tablet(s) Oral at bedtime    MEDICATIONS  (PRN):  acetaminophen   Tablet .. 650 milliGRAM(s) Oral every 8 hours PRN Mild Pain (1 - 3)  acetaminophen   Tablet .. 650 milliGRAM(s) Oral every 6 hours PRN Temp greater or equal to 38.5C (101.3F), Moderate Pain (4 - 6)  bisacodyl Suppository 10 milliGRAM(s) Rectal daily PRN If no bowel movement  HYDROmorphone  Injectable 0.5 milliGRAM(s) IV Push every 4 hours PRN Severe Pain (7 - 10)  magnesium hydroxide Suspension 30 milliLiter(s) Oral daily PRN Constipation  melatonin 3 milliGRAM(s) Oral at bedtime PRN Insomnia  ondansetron Injectable 4 milliGRAM(s) IV Push every 6 hours PRN Nausea and/or Vomiting  oxyCODONE    IR 2.5 milliGRAM(s) Oral every 4 hours PRN Moderate Pain (4 - 6)  oxyCODONE    IR 5 milliGRAM(s) Oral every 4 hours PRN Severe Pain (7 - 10)  traZODone 25 milliGRAM(s) Oral at bedtime PRN insomnia    Vital Signs Last 24 Hrs  T(C): 36.9 (13 Feb 2019 05:10), Max: 36.9 (13 Feb 2019 05:10)  T(F): 98.4 (13 Feb 2019 05:10), Max: 98.4 (13 Feb 2019 05:10)  HR: 77 (13 Feb 2019 05:10) (64 - 80)  BP: 114/47 (13 Feb 2019 05:10) (93/48 - 115/56)  BP(mean): --  RR: 16 (13 Feb 2019 05:10) (16 - 17)  SpO2: 94% (13 Feb 2019 05:10) (94% - 98%)  Daily     Daily     02-12 @ 07:01 - 02-13 @ 07:00  --------------------------------------------------------  IN: 360 mL / OUT: 125 mL / NET: 235 mL    02-13 @ 07:01 - 02-13 @ 09:53  --------------------------------------------------------  IN: 240 mL / OUT: 0 mL / NET: 240 mL        PHYSICAL EXAM:  Alert and appropriate  GENERAL: NO distress  CHEST/LUNG: clear to aus  HEART: S1S2 RRR  ABDOMEN: soft  EXTREMITIES: no edema  SKIN:     LABS:                        9.8    10.62 )-----------( 180      ( 12 Feb 2019 09:54 )             30.1     02-12    136  |  102  |  57<H>  ----------------------------<  114<H>  4.3   |  24  |  5.37<H>    Ca    7.0<L>      12 Feb 2019 09:54  Phos  5.1     02-12    TPro  x   /  Alb  2.1<L>  /  TBili  x   /  DBili  x   /  AST  x   /  ALT  x   /  AlkPhos  x   02-12        Phosphorus Level, Serum: 5.1 mg/dL (02-12 @ 09:54)          RADIOLOGY & ADDITIONAL TESTS:
NEPHROLOGY INTERVAL HPI/OVERNIGHT EVENTS:    Date of Service: 19 @ 14:12   SY  Feeling well. No new complaints.  Positive pain at surgical site.    HPI:  89 yo woman with ESRD on maintenance HD TIW since 2018.   Pt has been tolerating tx well with stable clinical status and with no acute issues.  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  Change her 's diaper a 2:00 AM and fell while walking back to bed.  Cant explain what happened.  Does not recall whether she felt lightheaded or dizzy.  In ED, found to have Left Intertrochanteric fracture.  Post Hip repair earlier today.  Feels tired.  Denies SOB.    PMHX and PSHX.  1.HTN  2.CAD (stent x1 in )  3.Previous hx of syncope    MEDICATIONS  (STANDING):  aspirin enteric coated 81 milliGRAM(s) Oral daily  atorvastatin 10 milliGRAM(s) Oral at bedtime  calcium carbonate 1250 mG  + Vitamin D (OsCal 500 + D) 1 Tablet(s) Oral daily  clopidogrel Tablet 75 milliGRAM(s) Oral daily  docusate sodium 100 milliGRAM(s) Oral three times a day  heparin  Injectable 5000 Unit(s) SubCutaneous every 12 hours  metoprolol succinate  milliGRAM(s) Oral daily  polyethylene glycol 3350 17 Gram(s) Oral daily  senna 2 Tablet(s) Oral at bedtime    MEDICATIONS  (PRN):  acetaminophen   Tablet .. 650 milliGRAM(s) Oral every 8 hours PRN Mild Pain (1 - 3)  acetaminophen   Tablet .. 650 milliGRAM(s) Oral every 6 hours PRN Temp greater or equal to 38.5C (101.3F), Moderate Pain (4 - 6)  HYDROmorphone  Injectable 0.5 milliGRAM(s) IV Push every 4 hours PRN Severe Pain (7 - 10)  magnesium hydroxide Suspension 30 milliLiter(s) Oral daily PRN Constipation  melatonin 3 milliGRAM(s) Oral at bedtime PRN Insomnia  ondansetron Injectable 4 milliGRAM(s) IV Push every 6 hours PRN Nausea and/or Vomiting  oxyCODONE    IR 2.5 milliGRAM(s) Oral every 4 hours PRN Moderate Pain (4 - 6)  oxyCODONE    IR 5 milliGRAM(s) Oral every 4 hours PRN Severe Pain (7 - 10)  traZODone 25 milliGRAM(s) Oral at bedtime PRN insomnia          Vital Signs Last 24 Hrs  T(C): 36.2 (2019 10:52), Max: 36.7 (10 Feb 2019 18:40)  T(F): 97.2 (2019 10:52), Max: 98 (10 Feb 2019 18:40)  HR: 62 (2019 10:52) (62 - 75)  BP: 93/41 (2019 10:52) (93/41 - 118/58)  BP(mean): --  RR: 17 (2019 10:52) (16 - 20)  SpO2: 98% (2019 10:52) (98% - 100%)  Daily     Daily Weight in k (2019 11:42)    02-10 @ 07: @ 07:00  --------------------------------------------------------  IN: 1025 mL / OUT: 50 mL / NET: 975 mL     @ 07: @ 14:12  --------------------------------------------------------  IN: 240 mL / OUT: 1 mL / NET: 239 mL        PHYSICAL EXAM:  Alert and comfortable.  GENERAL: No distress  CHEST/LUNG: Clear to aus  HEART: S1S2 RRR  ABDOMEN: soft  EXTREMITIES: no edema  SKIN:     LABS:                        8.4    11.66 )-----------( 191      ( 2019 05:54 )             27.0         141  |  105  |  35<H>  ----------------------------<  108<H>  4.9   |  27  |  4.22<H>    Ca    7.1<L>      2019 05:54  Phos  5.5         TPro  x   /  Alb  2.2<L>  /  TBili  x   /  DBili  x   /  AST  x   /  ALT  x   /  AlkPhos  x       PT/INR - ( 10 Feb 2019 08:34 )   PT: 10.6 sec;   INR: 0.96 ratio         PTT - ( 10 Feb 2019 08:34 )  PTT:19.6 sec    Phosphorus Level, Serum: 5.5 mg/dL ( @ 05:54)          RADIOLOGY & ADDITIONAL TESTS:
Pt S/E at bedside, no acute events overnight, pain controlled  Resting comfortably in bed, Distal dressing was pealing off so it was changed on rounds (2/13)    Vital Signs Last 24 Hrs  T(C): 36.9 (13 Feb 2019 05:10), Max: 36.9 (13 Feb 2019 05:10)  T(F): 98.4 (13 Feb 2019 05:10), Max: 98.4 (13 Feb 2019 05:10)  HR: 77 (13 Feb 2019 05:10) (64 - 80)  BP: 114/47 (13 Feb 2019 05:10) (93/48 - 115/56)  BP(mean): --  RR: 16 (13 Feb 2019 05:10) (16 - 17)  SpO2: 94% (13 Feb 2019 05:10) (94% - 98%)    Gen: NAD, AAOx3    LLE:  Dressing clean dry intact (Changed Distally 2/13)  +EHL/FHL/TA/GS  SILT L3-S1  +DP/PT Pulses  Compartments soft  No calf TTP B/L
Pt seen and examined at bedside. Pain well controlled. Denies CP/Dyspnea/Calf tenderness bilaterally.      Vital Signs Last 24 Hrs  T(C): 36.3 (11 Feb 2019 05:16), Max: 36.7 (10 Feb 2019 12:08)  T(F): 97.4 (11 Feb 2019 05:16), Max: 98.1 (10 Feb 2019 12:08)  HR: 74 (11 Feb 2019 05:16) (62 - 75)  BP: 106/49 (11 Feb 2019 05:16) (96/42 - 126/63)  BP(mean): --  RR: 16 (11 Feb 2019 05:16) (16 - 20)  SpO2: 98% (11 Feb 2019 05:16) (98% - 100%)    Physical exam  Gen: NAD  aquacel overlying lateral hip c/d/i  SCDs in place bilaterally  b/l calf NTTP bilaterally  +ehl/fhl/gs/ta  SILT L2-S1  +DP  compartments soft    A/P: 89y Female s/p IMN, now POD 1  FU AM Labs  Pain Control: IV/PO Opioids  DVT PPx: SCDs, Plavix  PT/OT: WBAT  Rest, Ice, Elevate  Incentive Spirometry, OOB  D/c planning
Pt seen and examined at bedside. Pain well controlled. Denies CP/Dyspnea/Calf tenderness bilaterally.      Vital Signs Last 24 Hrs  T(C): 36.3 (11 Feb 2019 23:18), Max: 36.7 (11 Feb 2019 14:10)  T(F): 97.3 (11 Feb 2019 23:18), Max: 98.1 (11 Feb 2019 17:24)  HR: 73 (11 Feb 2019 23:18) (60 - 73)  BP: 139/62 (11 Feb 2019 23:18) (93/41 - 139/62)  BP(mean): --  RR: 18 (11 Feb 2019 23:18) (16 - 18)  SpO2: 96% (11 Feb 2019 23:18) (93% - 98%)    Physical exam  Gen: NAD  aquacel overlying lateral hip c/d/i  SCDs in place bilaterally  b/l calf NTTP bilaterally  +ehl/fhl/gs/ta  SILT L2-S1  +DP  compartments soft    A/P: 89y Female s/p IMN, now POD 2  Pain Control: IV/PO Opioids  DVT PPx: SCDs, Plavix  PT/OT: WBAT  Rest, Ice, Elevate  Incentive Spirometry, OOB  D/c planning: home
No

## 2023-03-28 NOTE — ASU PATIENT PROFILE, ADULT - REASON FOR ADMISSION, PROFILE
Vaccine Information Statement(s) or the Emergency Use Authorization was given today. This has been reviewed, questions answered, and verbal consent given by Patient for injection(s) and administration of Shingles.     Patient tolerated without incident. See immunization grid for documentation.   creation of arteriovenous fistula right upper extremity

## 2023-06-13 ENCOUNTER — NON-APPOINTMENT (OUTPATIENT)
Age: 88
End: 2023-06-13

## 2023-06-13 DIAGNOSIS — I10 ESSENTIAL (PRIMARY) HYPERTENSION: ICD-10-CM

## 2023-06-13 DIAGNOSIS — N18.5 CHRONIC KIDNEY DISEASE, STAGE 5: ICD-10-CM

## 2023-06-13 DIAGNOSIS — I25.10 ATHEROSCLEROTIC HEART DISEASE OF NATIVE CORONARY ARTERY W/OUT ANGINA PECTORIS: ICD-10-CM

## 2023-06-13 RX ORDER — CLOPIDOGREL BISULFATE 75 MG/1
75 TABLET, FILM COATED ORAL DAILY
Refills: 0 | Status: ACTIVE | COMMUNITY

## 2023-06-13 RX ORDER — PARICALCITOL 1 UG/1
1 CAPSULE ORAL
Refills: 0 | Status: ACTIVE | COMMUNITY

## 2023-06-13 RX ORDER — CLONIDINE HYDROCHLORIDE 0.1 MG/1
0.1 TABLET ORAL TWICE DAILY
Refills: 0 | Status: ACTIVE | COMMUNITY

## 2023-06-13 RX ORDER — PRAVASTATIN SODIUM 10 MG/1
10 TABLET ORAL DAILY
Refills: 0 | Status: ACTIVE | COMMUNITY

## 2023-06-13 RX ORDER — METOPROLOL SUCCINATE 100 MG/1
100 TABLET, EXTENDED RELEASE ORAL DAILY
Refills: 0 | Status: ACTIVE | COMMUNITY

## 2023-06-13 RX ORDER — SODIUM BICARBONATE 650 MG/1
650 TABLET ORAL
Refills: 0 | Status: ACTIVE | COMMUNITY

## 2023-06-13 RX ORDER — FENTANYL 12 UG/H
12 PATCH, EXTENDED RELEASE TRANSDERMAL
Refills: 0 | Status: ACTIVE | COMMUNITY

## 2023-06-13 RX ORDER — ASPIRIN ENTERIC COATED TABLETS 81 MG 81 MG/1
81 TABLET, DELAYED RELEASE ORAL DAILY
Refills: 0 | Status: ACTIVE | COMMUNITY

## 2023-08-10 NOTE — ED ADULT TRIAGE NOTE - PAIN RATING/NUMBER SCALE (0-10): REST
Quality 226: Preventive Care And Screening: Tobacco Use: Screening And Cessation Intervention: Patient screened for tobacco use and is an ex/non-smoker Detail Level: Detailed Quality 431: Preventive Care And Screening: Unhealthy Alcohol Use - Screening: Patient not identified as an unhealthy alcohol user when screened for unhealthy alcohol use using a systematic screening method Quality 130: Documentation Of Current Medications In The Medical Record: Current Medications Documented 3

## 2023-10-04 NOTE — ED PROVIDER NOTE - ACUTE OR EVOLVING MI?
Detail Level: Simple Depth Of Biopsy: dermis Was A Bandage Applied: Yes Size Of Lesion In Cm: 0 Biopsy Type: H and E Biopsy Method: Dermablade Anesthesia Type: 1% lidocaine without epinephrine Anesthesia Volume In Cc (Will Not Render If 0): 0.4 Hemostasis: Electrocautery Wound Care: Petrolatum Dressing: bandage Destruction After The Procedure: No Type Of Destruction Used: Curettage Curettage Text: The wound bed was treated with curettage after the biopsy was performed. Cryotherapy Text: The wound bed was treated with cryotherapy after the biopsy was performed. Electrodesiccation Text: The wound bed was treated with electrodesiccation after the biopsy was performed. Electrodesiccation And Curettage Text: The wound bed was treated with electrodesiccation and curettage after the biopsy was performed. Silver Nitrate Text: The wound bed was treated with silver nitrate after the biopsy was performed. Lab: 6 Lab Facility: 3 Consent: Verbal consent was obtained and risks were reviewed including but not limited to scarring, infection, bleeding, scabbing, incomplete removal, nerve damage and allergy to anesthesia. Post-Care Instructions: I reviewed with the patient in detail post-care instructions. Patient is to keep the biopsy site dry overnight, and then apply bacitracin twice daily until healed. Patient may apply hydrogen peroxide soaks to remove any crusting. Notification Instructions: Patient will be notified of biopsy results. However, patient instructed to call the office if not contacted within 2 weeks. Billing Type: Third-Party Bill Information: Selecting Yes will display possible errors in your note based on the variables you have selected. This validation is only offered as a suggestion for you. PLEASE NOTE THAT THE VALIDATION TEXT WILL BE REMOVED WHEN YOU FINALIZE YOUR NOTE. IF YOU WANT TO FAX A PRELIMINARY NOTE YOU WILL NEED TO TOGGLE THIS TO 'NO' IF YOU DO NOT WANT IT IN YOUR FAXED NOTE. no

## 2023-10-26 NOTE — ED PROVIDER NOTE - PRINCIPAL DIAGNOSIS
Wound Care: Petrolatum Other closed fracture of left femur, unspecified portion of femur, initial encounter Closed fracture of left hip, initial encounter

## 2024-01-29 NOTE — PATIENT PROFILE ADULT - FALL HARM RISK CONCLUSION
Overall Financial Resource Strain (CARDIA)     Difficulty of Paying Living Expenses: Not hard at all   Transportation Needs: No Transportation Needs (7/24/2023)    PRAPARE - Transportation     Lack of Transportation (Medical): No     Lack of Transportation (Non-Medical): No   Physical Activity: Insufficiently Active (7/24/2023)    Exercise Vital Sign     Days of Exercise per Week: 7 days     Minutes of Exercise per Session: 10 min   Stress: No Stress Concern Present (7/24/2023)    Citizen of [place] of Occupational Health - Occupational Stress Questionnaire     Feeling of Stress : Not at all   Social Connections: Socially Isolated (7/24/2023)    Social Connection and Isolation Panel [NHANES]     Frequency of Communication with Friends and Family: More than three times a week     Frequency of Social Gatherings with Friends and Family: More than three times a week     Attends [employer] Services: Never     Active Member of Clubs or Organizations: No     Attends Club or Organization Meetings: Never     Marital Status: Never    Intimate Partner Violence: Not At Risk (7/24/2023)    Humiliation, Afraid, Rape, and Kick questionnaire     Fear of Current or Ex-Partner: No     Emotionally Abused: No     Physically Abused: No     Sexually Abused: No   Housing Stability: Low Risk  (7/24/2023)    Housing Stability Vital Sign     Unable to Pay for Housing in the Last Year: No     Number of Places Lived in the Last Year: 1     Unstable Housing in the Last Year: No        SURGICAL HISTORY  Past Surgical History:   Procedure Laterality Date    BRAIN SURGERY  04/2000    BREAST SURGERY Right     benign    EYE SURGERY Right 9/4/13    HYSTERECTOMY (CERVIX STATUS UNKNOWN)                   CURRENT MEDICATIONS  Current Outpatient Medications   Medication Sig Dispense Refill    XCOPRI 50 MG TABS TAKE ONE TABLET BY MOUTH once DAILY. take in combination with 200mg tablet for a total dose of 250mg at bedtime      lamoTRIgine (LAMICTAL) 
Fall with Harm Risk

## 2024-09-01 NOTE — ED ADULT NURSE NOTE - NS_HUMANTRAFFICKQ2_ED_ALL_ED
Use your Lucemyra as prescribed for opiate withdrawal.  Take Keppra 500 mg twice a day x 2 weeks and then may increase to 1000 mg twice daily  
No

## 2025-04-15 NOTE — ED PROVIDER NOTE - RESPIRATORY, MLM
Left side rash/internal pain on left side - kidney/liver area.         Breath sounds clear and equal bilaterally.

## 2025-07-18 NOTE — ED PROVIDER NOTE - CLINICAL SUMMARY MEDICAL DECISION MAKING FREE TEXT BOX
18
Labs unremarkable.  Dressing placed with hemostasis.  D/c home with supportive care for skin tear.  F/u with HD as scheduled.